# Patient Record
Sex: FEMALE | Race: BLACK OR AFRICAN AMERICAN | NOT HISPANIC OR LATINO | Employment: PART TIME | URBAN - METROPOLITAN AREA
[De-identification: names, ages, dates, MRNs, and addresses within clinical notes are randomized per-mention and may not be internally consistent; named-entity substitution may affect disease eponyms.]

---

## 2018-05-22 ENCOUNTER — HOSPITAL ENCOUNTER (OUTPATIENT)
Dept: CARDIOLOGY | Facility: HOSPITAL | Age: 48
Discharge: HOME | End: 2018-05-22
Attending: SURGERY | Admitting: SURGERY

## 2019-02-26 ENCOUNTER — HOSPITAL ENCOUNTER (OUTPATIENT)
Dept: FAMILY MEDICINE CLINIC | Facility: CLINIC | Age: 49
Setting detail: SPECIMEN
Discharge: HOME | End: 2019-02-26
Attending: FAMILY MEDICINE | Admitting: FAMILY MEDICINE

## 2019-02-26 LAB
ALBUMIN SERPL-MCNC: 3.6 G/DL (ref 3.5–4.8)
ALBUMIN/GLOB SERPL: 1 {RATIO} (ref 1–1.7)
ALP SERPL-CCNC: 54 IU/L (ref 32–91)
ALT SERPL-CCNC: 17 IU/L (ref 14–54)
ANION GAP SERPL CALC-SCNC: 13.1 MMOL/L (ref 10–20)
AST SERPL-CCNC: 20 IU/L (ref 15–41)
BASOPHILS # BLD AUTO: 0 10*3/UL (ref 0–0.2)
BASOPHILS NFR BLD AUTO: 1 % (ref 0–2)
BILIRUB SERPL-MCNC: 0.5 MG/DL (ref 0.3–1.2)
BUN SERPL-MCNC: 9 MG/DL (ref 8–20)
BUN/CREAT SERPL: 12.9 (ref 5.4–26.2)
CALCIUM SERPL-MCNC: 8.9 MG/DL (ref 8.9–10.3)
CHLORIDE SERPL-SCNC: 103 MMOL/L (ref 101–111)
CHOLEST SERPL-MCNC: 200 MG/DL
CHOLEST/HDLC SERPL: 2.6 {RATIO}
CONV CO2: 25 MMOL/L (ref 22–32)
CONV LDL CHOLESTEROL DIRECT: 117 MG/DL (ref 0–100)
CONV TOTAL PROTEIN: 7.2 G/DL (ref 6.1–7.9)
CREAT UR-MCNC: 0.7 MG/DL (ref 0.4–1)
DIFFERENTIAL METHOD BLD: (no result)
EOSINOPHIL # BLD AUTO: 0.1 10*3/UL (ref 0–0.3)
EOSINOPHIL # BLD AUTO: 3 % (ref 0–3)
ERYTHROCYTE [DISTWIDTH] IN BLOOD BY AUTOMATED COUNT: 13.4 % (ref 11.5–14.5)
GLOBULIN UR ELPH-MCNC: 3.6 G/DL (ref 2.5–3.8)
GLUCOSE SERPL-MCNC: 101 MG/DL (ref 65–99)
HCT VFR BLD AUTO: 38.6 % (ref 35–49)
HDLC SERPL-MCNC: 77 MG/DL
HGB BLD-MCNC: 12.7 G/DL (ref 12–15)
LDLC/HDLC SERPL: 1.5 {RATIO}
LIPID INTERPRETATION: ABNORMAL
LYMPHOCYTES # BLD AUTO: 1.8 10*3/UL (ref 0.8–4.8)
LYMPHOCYTES NFR BLD AUTO: 39 % (ref 18–42)
MCH RBC QN AUTO: 31.8 PG (ref 26–32)
MCHC RBC AUTO-ENTMCNC: 32.8 G/DL (ref 32–36)
MCV RBC AUTO: 97 FL (ref 80–94)
MONOCYTES # BLD AUTO: 0.3 10*3/UL (ref 0.1–1.3)
MONOCYTES NFR BLD AUTO: 8 % (ref 2–11)
NEUTROPHILS # BLD AUTO: 2.3 10*3/UL (ref 2.3–8.6)
NEUTROPHILS NFR BLD AUTO: 49 % (ref 50–75)
NRBC BLD AUTO-RTO: 0 /100{WBCS}
NRBC/RBC NFR BLD MANUAL: 0 10*3/UL
PLATELET # BLD AUTO: 238 10*3/UL (ref 150–450)
PMV BLD AUTO: 8.8 FL (ref 7.4–10.4)
POTASSIUM SERPL-SCNC: 4.1 MMOL/L (ref 3.6–5.1)
RBC # BLD AUTO: 3.98 10*6/UL (ref 4–5.4)
SODIUM SERPL-SCNC: 137 MMOL/L (ref 136–144)
TRIGL SERPL-MCNC: 61 MG/DL
VLDLC SERPL CALC-MCNC: 6.5 MG/DL
WBC # BLD AUTO: 4.6 10*3/UL (ref 4.5–11.5)

## 2019-10-15 ENCOUNTER — HOSPITAL ENCOUNTER (EMERGENCY)
Facility: HOSPITAL | Age: 49
Discharge: HOME | End: 2019-10-15
Admitting: EMERGENCY MEDICINE

## 2019-10-15 ENCOUNTER — APPOINTMENT (OUTPATIENT)
Dept: CT IMAGING | Facility: HOSPITAL | Age: 49
End: 2019-10-15

## 2019-10-15 VITALS
SYSTOLIC BLOOD PRESSURE: 146 MMHG | DIASTOLIC BLOOD PRESSURE: 99 MMHG | WEIGHT: 249.6 LBS | RESPIRATION RATE: 16 BRPM | HEART RATE: 75 BPM | TEMPERATURE: 98.1 F | BODY MASS INDEX: 39.18 KG/M2 | OXYGEN SATURATION: 96 % | HEIGHT: 67 IN

## 2019-10-15 DIAGNOSIS — R51.9 ACUTE NONINTRACTABLE HEADACHE, UNSPECIFIED HEADACHE TYPE: Primary | ICD-10-CM

## 2019-10-15 DIAGNOSIS — I10 HYPERTENSION, UNSPECIFIED TYPE: ICD-10-CM

## 2019-10-15 PROCEDURE — 70450 CT HEAD/BRAIN W/O DYE: CPT

## 2019-10-15 PROCEDURE — 99283 EMERGENCY DEPT VISIT LOW MDM: CPT

## 2019-10-15 RX ORDER — CLONIDINE HYDROCHLORIDE 0.1 MG/1
0.2 TABLET ORAL ONCE
Status: COMPLETED | OUTPATIENT
Start: 2019-10-15 | End: 2019-10-15

## 2019-10-15 RX ORDER — DIPHENHYDRAMINE HCL 50 MG
50 CAPSULE ORAL EVERY 6 HOURS PRN
COMMUNITY
End: 2020-03-04

## 2019-10-15 RX ORDER — HYDROCODONE BITARTRATE AND ACETAMINOPHEN 7.5; 325 MG/1; MG/1
1 TABLET ORAL ONCE
Status: COMPLETED | OUTPATIENT
Start: 2019-10-15 | End: 2019-10-15

## 2019-10-15 RX ORDER — HYDROCODONE BITARTRATE AND ACETAMINOPHEN 7.5; 325 MG/1; MG/1
1-2 TABLET ORAL EVERY 6 HOURS PRN
Qty: 15 TABLET | Refills: 0 | Status: SHIPPED | OUTPATIENT
Start: 2019-10-15 | End: 2020-03-04

## 2019-10-15 RX ADMIN — HYDROCODONE BITARTRATE AND ACETAMINOPHEN 1 TABLET: 7.5; 325 TABLET ORAL at 19:31

## 2019-10-15 RX ADMIN — CLONIDINE HYDROCHLORIDE 0.2 MG: 0.1 TABLET ORAL at 19:31

## 2019-10-15 NOTE — ED NOTES
Dental pain right side. Seen dentist and has abscess. Given antibiotics. Forgot her B/P medication today so b/p is elevated. Has headache right side of head     Luiza Mclaughlin RN  10/15/19 1923       Luiza Mclaughlin RN  10/15/19 1924

## 2019-10-15 NOTE — ED PROVIDER NOTES
Subjective   Chief Complaint: High blood pressure  Context: Patient reports right-sided dental pain for a couple of days.  She reports that she woke up this morning and had increased pain.  She states that she went to her dentist and they diagnosed her with an abscess and put her on ibuprofen and Keflex.  She reports that she had forgotten to take her high blood pressure medicines and her blood pressure was elevated at the dentist office.  She reports that she took her blood pressure medicine after that however her blood pressure has remained elevated this evening.  She does complain of a headache.  She denies numbness, weakness, tingling.  She denies difficulty with speech.  No chest pain or shortness of breath.  Duration: As above  Timing: Constant  Severity: Moderate  Associated symptoms and or modifying factors: As above          History provided by:  Patient      Review of Systems   Constitutional: Negative for chills and fever.   HENT: Positive for dental problem. Negative for congestion.    Respiratory: Negative for shortness of breath.    Cardiovascular: Negative for chest pain.   Gastrointestinal: Negative for diarrhea, nausea and vomiting.   Neurological: Positive for headaches. Negative for speech difficulty, weakness and numbness.       Past Medical History:   Diagnosis Date   • Hypertension        No Known Allergies    Past Surgical History:   Procedure Laterality Date   • TUBAL ABDOMINAL LIGATION         No family history on file.    Social History     Socioeconomic History   • Marital status:      Spouse name: Not on file   • Number of children: Not on file   • Years of education: Not on file   • Highest education level: Not on file   Tobacco Use   • Smoking status: Never Smoker   Substance and Sexual Activity   • Alcohol use: Yes           Objective   Physical Exam  The patient is well-developed, well-nourished, alert, cooperative and in no acute distress.    HEENT exam is normocephalic and  "atraumatic. Pupils equal ,round, reactive to light. Extraocular muscles are intact. Conjunctivae non- injected, sclerae anicteric, lids without ptosis, edema or erythema. External auditory canals and tympanic membranes are clear. No nasal drainage.  Sinuses non-tender. Mucous membranes are moist.      Neck is supple, non-tender without lymphadenopathy. No JVD, bruit or stridor noted.     Lungs clear to auscultation bilaterally.    Cardiovascular. Regular rate and rhythm     Abdomen is soft, nontender, nondistended. No guarding or rebound noted.     Extremities: There is no significant deformity or joint abnormality. No edema.   Neurologic: Oriented x3 without focal neurological deficits.    Skin shows no rash, petechiae, or purpura.    Procedures           ED Course        Labs Reviewed - No data to display  Ct Head Without Contrast    Result Date: 10/15/2019  Normal CT of the brain without contrast. Brain MRI is more sensitive to evaluate for acute or subacute infarcts and to evaluate for intracranial metastatic disease.  Electronically Signed By-Cecil Fuentes On:10/15/2019 8:42 PM This report was finalized on [id number] by  Cecil Fuentes, .    Medications   HYDROcodone-acetaminophen (NORCO) 7.5-325 MG per tablet 1 tablet (1 tablet Oral Given 10/15/19 1931)   cloNIDine (CATAPRES) tablet 0.2 mg (0.2 mg Oral Given 10/15/19 1931)     BP (!) 153/110 (BP Location: Right arm, Patient Position: Sitting)   Pulse 77   Temp 98 °F (36.7 °C) (Oral)   Resp 14   Ht 170.2 cm (67\")   Wt 113 kg (249 lb 9.6 oz)   SpO2 98%   BMI 39.09 kg/m²             MDM  Number of Diagnoses or Management Options  Acute nonintractable headache, unspecified headache type:   Hypertension, unspecified type:   Diagnosis management comments: MEDICAL DECISION  Comorbidities: Hypertension, dental abscess  Differentials: Head bleed, uncontrolled hypertension.; this list is not all inclusive and does not constitute the entirety of considered " causes  Radiology interpretation:  Images reviewed by me and interpreted by radiologist, CT of the head shows no acute intracranial abnormality.    She was medicated emergency department.  She reports her headache is gone.  Repeat blood pressure 146/99  Inspect was queried.  Prescription for Norco.  Plan for discharge was discussed.       Amount and/or Complexity of Data Reviewed  Tests in the radiology section of CPT®: reviewed and ordered        Final diagnoses:   Acute nonintractable headache, unspecified headache type   Hypertension, unspecified type              Tressa Dumas, ANDREW  10/15/19 2120

## 2019-10-16 NOTE — DISCHARGE INSTRUCTIONS
Follow-up with your doctor.  Take your blood pressure medicine regularly.  Norco for pain as prescribed.  Take your antibiotic as prescribed.  Follow-up with your dentist

## 2020-03-04 ENCOUNTER — OFFICE VISIT (OUTPATIENT)
Dept: FAMILY MEDICINE CLINIC | Facility: CLINIC | Age: 50
End: 2020-03-04

## 2020-03-04 VITALS
SYSTOLIC BLOOD PRESSURE: 138 MMHG | DIASTOLIC BLOOD PRESSURE: 87 MMHG | HEART RATE: 85 BPM | OXYGEN SATURATION: 96 % | HEIGHT: 66 IN | WEIGHT: 252 LBS | TEMPERATURE: 97.7 F | BODY MASS INDEX: 40.5 KG/M2

## 2020-03-04 DIAGNOSIS — L91.8 SKIN TAGS, MULTIPLE ACQUIRED: ICD-10-CM

## 2020-03-04 DIAGNOSIS — I10 ESSENTIAL HYPERTENSION: Primary | ICD-10-CM

## 2020-03-04 DIAGNOSIS — G89.29 CHRONIC LEFT-SIDED LOW BACK PAIN WITHOUT SCIATICA: ICD-10-CM

## 2020-03-04 DIAGNOSIS — M54.50 CHRONIC LEFT-SIDED LOW BACK PAIN WITHOUT SCIATICA: ICD-10-CM

## 2020-03-04 PROBLEM — I83.90 VARICOSE VEINS OF LOWER EXTREMITY: Status: ACTIVE | Noted: 2018-04-04

## 2020-03-04 PROBLEM — F32.A DEPRESSION: Status: ACTIVE | Noted: 2018-10-15

## 2020-03-04 PROBLEM — K21.9 GASTROESOPHAGEAL REFLUX DISEASE: Status: ACTIVE | Noted: 2018-10-15

## 2020-03-04 PROBLEM — E78.5 HYPERLIPIDEMIA: Status: ACTIVE | Noted: 2018-04-04

## 2020-03-04 PROCEDURE — 99214 OFFICE O/P EST MOD 30 MIN: CPT | Performed by: FAMILY MEDICINE

## 2020-03-04 RX ORDER — BACLOFEN 10 MG/1
10 TABLET ORAL 3 TIMES DAILY
Qty: 90 TABLET | Refills: 1 | Status: SHIPPED | OUTPATIENT
Start: 2020-03-04 | End: 2020-08-26

## 2020-03-04 RX ORDER — OMEPRAZOLE 20 MG/1
CAPSULE, DELAYED RELEASE ORAL
COMMUNITY
Start: 2018-12-07 | End: 2020-07-10 | Stop reason: SDUPTHER

## 2020-03-04 RX ORDER — LISINOPRIL AND HYDROCHLOROTHIAZIDE 20; 12.5 MG/1; MG/1
1 TABLET ORAL DAILY
COMMUNITY
End: 2020-04-29 | Stop reason: SDUPTHER

## 2020-03-04 RX ORDER — ATORVASTATIN CALCIUM 20 MG/1
TABLET, FILM COATED ORAL EVERY 24 HOURS
COMMUNITY
Start: 2018-10-15 | End: 2020-07-10 | Stop reason: SDUPTHER

## 2020-03-04 RX ORDER — FLUTICASONE PROPIONATE 50 MCG
SPRAY, SUSPENSION (ML) NASAL
COMMUNITY
Start: 2018-10-15 | End: 2020-07-10 | Stop reason: SDUPTHER

## 2020-03-04 NOTE — PROGRESS NOTES
Subjective   Chief Complaint   Patient presents with   • skin tags checked   • Hypertension     f/u     Aisha Appiah is a 49 y.o. female.     Hypertension   This is a chronic problem. The current episode started more than 1 year ago. The problem has been gradually improving since onset. Pertinent negatives include no anxiety, blurred vision, chest pain, headaches, malaise/fatigue, palpitations or shortness of breath. There are no associated agents to hypertension. Current antihypertension treatment includes diuretics. The current treatment provides moderate improvement. There are no compliance problems.    Back Pain   This is a new problem. The current episode started 1 to 4 weeks ago. The problem occurs intermittently. The problem has been waxing and waning since onset. The pain is present in the lumbar spine. The quality of the pain is described as aching. The pain does not radiate. The pain is moderate. The symptoms are aggravated by bending. Pertinent negatives include no abdominal pain, chest pain, fever or headaches. She has tried analgesics for the symptoms. The treatment provided mild relief.      Past Medical History:   Diagnosis Date   • Hypertension      Past Surgical History:   Procedure Laterality Date   • TUBAL ABDOMINAL LIGATION       No Known Allergies  Social History     Socioeconomic History   • Marital status:      Spouse name: Not on file   • Number of children: Not on file   • Years of education: Not on file   • Highest education level: Not on file   Tobacco Use   • Smoking status: Former Smoker     Years: 15.00   • Smokeless tobacco: Never Used   • Tobacco comment: quit 13 yrs ago   Substance and Sexual Activity   • Alcohol use: Yes     Comment: caffeine 1c qd     Social History     Tobacco Use   Smoking Status Former Smoker   • Years: 15.00   Smokeless Tobacco Never Used   Tobacco Comment    quit 13 yrs ago       family history is not on file.  Current Outpatient Medications on File  "Prior to Visit   Medication Sig Dispense Refill   • atorvastatin (LIPITOR) 20 MG tablet Daily.     • fluticasone (FLONASE) 50 MCG/ACT nasal spray FLONASE 50 MCG/ACT NASAL SUSPENSION     • lisinopril-hydrochlorothiazide (PRINZIDE,ZESTORETIC) 20-12.5 MG per tablet Take 1 tablet by mouth Daily.     • omeprazole (priLOSEC) 20 MG capsule TK ONE C PO D FOR STOMACH ACID       No current facility-administered medications on file prior to visit.      Patient Active Problem List   Diagnosis   • Depression   • Gastroesophageal reflux disease   • Hyperlipidemia   • Hypertension   • Varicose veins of lower extremity   • Lower back pain   • Inflamed skin tag       The following portions of the patient's history were reviewed and updated as appropriate: allergies, current medications, past family history, past medical history, past social history, past surgical history and problem list.    Review of Systems   Constitutional: Negative for chills, fever and malaise/fatigue.   HENT: Negative for sinus pressure and sore throat.    Eyes: Negative for blurred vision.   Respiratory: Negative for cough and shortness of breath.    Cardiovascular: Negative for chest pain and palpitations.   Gastrointestinal: Negative for abdominal pain.   Endocrine: Negative for polyuria.   Musculoskeletal: Positive for back pain.   Skin: Negative for rash.   Neurological: Negative for dizziness and headache.   Hematological: Negative for adenopathy.   Psychiatric/Behavioral: Negative for depressed mood.       Objective   /87 (BP Location: Left arm, Patient Position: Sitting, Cuff Size: Large Adult)   Pulse 85   Temp 97.7 °F (36.5 °C) (Oral)   Ht 167.6 cm (66\")   Wt 114 kg (252 lb)   SpO2 96%   BMI 40.67 kg/m²   Physical Exam   Constitutional: She is oriented to person, place, and time. She appears well-developed. No distress.   HENT:   Head: Normocephalic.   Eyes: Conjunctivae and lids are normal.   Neck: Trachea normal. No thyroid mass and no " thyromegaly present.   Cardiovascular: Normal rate, regular rhythm and normal heart sounds.   Pulmonary/Chest: Effort normal and breath sounds normal.   Musculoskeletal: Normal range of motion.   Lymphadenopathy:     She has no cervical adenopathy.   Neurological: She is alert and oriented to person, place, and time.   Skin: Skin is warm and dry.   Multiple irritated skin tags around neck   Psychiatric: She has a normal mood and affect. Her speech is normal and behavior is normal. She is attentive.       No visits with results within 1 Week(s) from this visit.   Latest known visit with results is:   No results found for any previous visit.           Assessment/Plan   Problems Addressed this Visit        Cardiovascular and Mediastinum    Hypertension - Primary    Relevant Medications    lisinopril-hydrochlorothiazide (PRINZIDE,ZESTORETIC) 20-12.5 MG per tablet       Nervous and Auditory    Lower back pain       Musculoskeletal and Integument    Inflamed skin tag     Return for removal.

## 2020-03-10 ENCOUNTER — OFFICE VISIT (OUTPATIENT)
Dept: FAMILY MEDICINE CLINIC | Facility: CLINIC | Age: 50
End: 2020-03-10

## 2020-03-10 VITALS
HEART RATE: 87 BPM | WEIGHT: 254 LBS | DIASTOLIC BLOOD PRESSURE: 86 MMHG | OXYGEN SATURATION: 95 % | TEMPERATURE: 96.9 F | BODY MASS INDEX: 41 KG/M2 | SYSTOLIC BLOOD PRESSURE: 128 MMHG

## 2020-03-10 DIAGNOSIS — L91.8 INFLAMED SKIN TAG: Primary | ICD-10-CM

## 2020-03-10 PROCEDURE — 11200 RMVL SKIN TAGS UP TO&INC 15: CPT | Performed by: FAMILY MEDICINE

## 2020-03-10 NOTE — PROGRESS NOTES
Subjective   Chief Complaint   Patient presents with   • remove skin tags     Aisha Appiah is a 49 y.o. female.   Skin Tag Removal  Date/Time: 3/10/2020 3:15 PM  Performed by: Ariane Beaulieu MD  Authorized by: Ariane Beaulieu MD   Preparation: Patient was prepped and draped in the usual sterile fashion.  Local anesthesia used: yes  Anesthesia: local infiltration    Anesthesia:  Local anesthesia used: yes  Local Anesthetic: lidocaine 1% without epinephrine  Anesthetic total: 3 mL    Sedation:  Patient sedated: no    Patient tolerance: Patient tolerated the procedure well with no immediate complications  Comments: 2 tags removed from beneath right breast, 8 removed from right neck, 3 removed from left neck          History of Present Illness   Past Medical History:   Diagnosis Date   • Hypertension      Past Surgical History:   Procedure Laterality Date   • TUBAL ABDOMINAL LIGATION       No Known Allergies  Social History     Socioeconomic History   • Marital status:      Spouse name: Not on file   • Number of children: Not on file   • Years of education: Not on file   • Highest education level: Not on file   Tobacco Use   • Smoking status: Former Smoker     Years: 15.00   • Smokeless tobacco: Never Used   • Tobacco comment: quit 13 yrs ago   Substance and Sexual Activity   • Alcohol use: Yes     Comment: caffeine 1c qd     Social History     Tobacco Use   Smoking Status Former Smoker   • Years: 15.00   Smokeless Tobacco Never Used   Tobacco Comment    quit 13 yrs ago       family history is not on file.  Current Outpatient Medications on File Prior to Visit   Medication Sig Dispense Refill   • atorvastatin (LIPITOR) 20 MG tablet Daily.     • baclofen (LIORESAL) 10 MG tablet Take 1 tablet by mouth 3 (Three) Times a Day. 90 tablet 1   • fluticasone (FLONASE) 50 MCG/ACT nasal spray FLONASE 50 MCG/ACT NASAL SUSPENSION     • lisinopril-hydrochlorothiazide (PRINZIDE,ZESTORETIC) 20-12.5 MG per tablet Take  1 tablet by mouth Daily.     • omeprazole (priLOSEC) 20 MG capsule TK ONE C PO D FOR STOMACH ACID       No current facility-administered medications on file prior to visit.      Patient Active Problem List   Diagnosis   • Depression   • Gastroesophageal reflux disease   • Hyperlipidemia   • Hypertension   • Varicose veins of lower extremity   • Lower back pain   • Inflamed skin tag       The following portions of the patient's history were reviewed and updated as appropriate: allergies, current medications, past family history, past medical history, past social history, past surgical history and problem list.    Review of Systems    Objective   /86 (BP Location: Right arm, Patient Position: Sitting, Cuff Size: Large Adult)   Pulse 87   Temp 96.9 °F (36.1 °C) (Tympanic)   Wt 115 kg (254 lb)   SpO2 95%   BMI 41.00 kg/m²   Physical Exam    No visits with results within 1 Week(s) from this visit.   Latest known visit with results is:   No results found for any previous visit.       Aisha was seen today for remove skin tags.    Diagnoses and all orders for this visit:    Inflamed skin tag  -     Skin Tag Removal

## 2020-04-01 ENCOUNTER — TELEPHONE (OUTPATIENT)
Dept: FAMILY MEDICINE CLINIC | Facility: CLINIC | Age: 50
End: 2020-04-01

## 2020-04-01 RX ORDER — BENZONATATE 200 MG/1
200 CAPSULE ORAL 3 TIMES DAILY PRN
Qty: 20 CAPSULE | Refills: 0 | Status: SHIPPED | OUTPATIENT
Start: 2020-04-01 | End: 2020-09-02

## 2020-04-01 NOTE — TELEPHONE ENCOUNTER
Since patient taking Lisinopril for BP could be chronic cough because of that, she needs office visit to discuss change in BP meds. I have sent Rx Tessalon pearls for  Sx management.

## 2020-04-01 NOTE — TELEPHONE ENCOUNTER
PT called states she  Is having a bad  Chronic cough,  She states no fever or  Trouble breathing. plz advise

## 2020-04-06 ENCOUNTER — TELEPHONE (OUTPATIENT)
Dept: FAMILY MEDICINE CLINIC | Facility: CLINIC | Age: 50
End: 2020-04-06

## 2020-04-06 NOTE — TELEPHONE ENCOUNTER
Pt c/o still having a cough and wants to be tested for COVID-19. I gave her the info to Valerie Pt .

## 2020-04-09 ENCOUNTER — OFFICE VISIT (OUTPATIENT)
Dept: FAMILY MEDICINE CLINIC | Facility: CLINIC | Age: 50
End: 2020-04-09

## 2020-04-09 DIAGNOSIS — J40 BRONCHITIS: Primary | ICD-10-CM

## 2020-04-09 PROBLEM — J20.9 ACUTE BRONCHITIS: Status: ACTIVE | Noted: 2020-04-09

## 2020-04-09 PROCEDURE — 99213 OFFICE O/P EST LOW 20 MIN: CPT | Performed by: FAMILY MEDICINE

## 2020-04-09 RX ORDER — METHYLPREDNISOLONE 4 MG/1
TABLET ORAL
Qty: 1 EACH | Refills: 0 | Status: SHIPPED | OUTPATIENT
Start: 2020-04-09 | End: 2020-09-02

## 2020-04-09 RX ORDER — AZITHROMYCIN 250 MG/1
250 TABLET, FILM COATED ORAL DAILY
Qty: 6 TABLET | Refills: 0 | Status: SHIPPED | OUTPATIENT
Start: 2020-04-09 | End: 2020-04-14

## 2020-04-09 RX ORDER — ALBUTEROL SULFATE 90 UG/1
2 AEROSOL, METERED RESPIRATORY (INHALATION) EVERY 6 HOURS PRN
Qty: 1 INHALER | Refills: 0 | Status: SHIPPED | OUTPATIENT
Start: 2020-04-09

## 2020-04-09 NOTE — PROGRESS NOTES
Subjective   Chief Complaint   Patient presents with   • Cough     2 weeks     Aisha Appiah is a 49 y.o. female.     Patient Care Team:  Ariane Beaulieu MD as PCP - General (Family Medicine)    She is being evaluated today due to cough and congestion, which she has been dealing with for about last 2 weeks.  She reports that her cough feels to be coming from her chest and at times she brings up some sputum but usually she has hard time to do that and it seems to be thick.  She denies any difficulty breathing or any fever.  She has history of recurrent bronchitis and her current situation feels the same.  She denies any fever, nausea, vomiting, or diarrhea.  She denies any change in her smell or taste.  She tried to take Tessalon Perles, which she already has at home but those are not really helping.  On further questioning if it comes to her cough I also address her blood pressure medicine.  She is on lisinopril which can potentially cause dry cough, but she is not really complaining about dry cough.  She has been on lisinopril for about a year per her report and never had any issue with that.       The following portions of the patient's history were reviewed and updated as appropriate: allergies, current medications, past family history, past medical history, past social history, past surgical history and problem list.  Past Medical History:   Diagnosis Date   • Hypertension      Past Surgical History:   Procedure Laterality Date   • TUBAL ABDOMINAL LIGATION       The patient has a family history of  History reviewed. No pertinent family history.  Social History     Socioeconomic History   • Marital status:      Spouse name: Not on file   • Number of children: Not on file   • Years of education: Not on file   • Highest education level: Not on file   Tobacco Use   • Smoking status: Former Smoker     Years: 15.00   • Smokeless tobacco: Never Used   • Tobacco comment: quit 13 yrs ago   Substance and  Sexual Activity   • Alcohol use: Yes     Comment: caffeine 1c qd       Review of Systems   Constitutional: Negative for activity change, appetite change, chills and fever.   HENT: Negative for congestion, ear pain, postnasal drip, sinus pressure, sore throat and swollen glands.    Respiratory: Positive for cough and wheezing. Negative for choking, chest tightness, shortness of breath and stridor.    Cardiovascular: Negative for chest pain.   Skin: Negative for dry skin and rash.   Allergic/Immunologic: Positive for environmental allergies.     There were no vitals taken for this visit.    Current Outpatient Medications:   •  albuterol sulfate  (90 Base) MCG/ACT inhaler, Inhale 2 puffs Every 6 (Six) Hours As Needed for Wheezing., Disp: 1 inhaler, Rfl: 0  •  atorvastatin (LIPITOR) 20 MG tablet, Daily., Disp: , Rfl:   •  azithromycin (ZITHROMAX) 250 MG tablet, Take 1 tablet by mouth Daily for 5 days. Take 2 tablets the first day, then 1 tablet daily for 4 days., Disp: 6 tablet, Rfl: 0  •  baclofen (LIORESAL) 10 MG tablet, Take 1 tablet by mouth 3 (Three) Times a Day., Disp: 90 tablet, Rfl: 1  •  benzonatate (TESSALON) 200 MG capsule, Take 1 capsule by mouth 3 (Three) Times a Day As Needed for Cough., Disp: 20 capsule, Rfl: 0  •  fluticasone (FLONASE) 50 MCG/ACT nasal spray, FLONASE 50 MCG/ACT NASAL SUSPENSION, Disp: , Rfl:   •  lisinopril-hydrochlorothiazide (PRINZIDE,ZESTORETIC) 20-12.5 MG per tablet, Take 1 tablet by mouth Daily., Disp: , Rfl:   •  methylPREDNISolone (Medrol) 4 MG tablet, Take as directed on package instructions., Disp: 1 each, Rfl: 0  •  omeprazole (priLOSEC) 20 MG capsule, TK ONE C PO D FOR STOMACH ACID, Disp: , Rfl:     Objective   Physical Exam   Constitutional: She is oriented to person, place, and time. No distress.   Patient was evaluated on the phone, she is pleasant and cooperative, she is in no distress, normal speech and voice.  Deep chest cough noted throughout the interview, but  no shortness of breath.   Neurological: She is alert and oriented to person, place, and time.   Psychiatric: She has a normal mood and affect. Judgment and thought content normal.              Assessment/Plan   Problems Addressed this Visit        Respiratory    Bronchitis - Primary    Relevant Medications    albuterol sulfate  (90 Base) MCG/ACT inhaler                     Requested Prescriptions     Signed Prescriptions Disp Refills   • azithromycin (ZITHROMAX) 250 MG tablet 6 tablet 0     Sig: Take 1 tablet by mouth Daily for 5 days. Take 2 tablets the first day, then 1 tablet daily for 4 days.   • methylPREDNISolone (Medrol) 4 MG tablet 1 each 0     Sig: Take as directed on package instructions.   • albuterol sulfate  (90 Base) MCG/ACT inhaler 1 inhaler 0     Sig: Inhale 2 puffs Every 6 (Six) Hours As Needed for Wheezing.

## 2020-04-13 ENCOUNTER — HOSPITAL ENCOUNTER (EMERGENCY)
Facility: HOSPITAL | Age: 50
Discharge: HOME | End: 2020-04-13
Attending: EMERGENCY MEDICINE | Admitting: EMERGENCY MEDICINE

## 2020-04-13 ENCOUNTER — APPOINTMENT (OUTPATIENT)
Dept: GENERAL RADIOLOGY | Facility: HOSPITAL | Age: 50
End: 2020-04-13

## 2020-04-13 VITALS
SYSTOLIC BLOOD PRESSURE: 155 MMHG | HEART RATE: 71 BPM | HEIGHT: 67 IN | BODY MASS INDEX: 40.76 KG/M2 | DIASTOLIC BLOOD PRESSURE: 88 MMHG | OXYGEN SATURATION: 99 % | WEIGHT: 259.7 LBS | RESPIRATION RATE: 19 BRPM | TEMPERATURE: 98 F

## 2020-04-13 DIAGNOSIS — J40 BRONCHITIS: Primary | ICD-10-CM

## 2020-04-13 DIAGNOSIS — M54.6 ACUTE LEFT-SIDED THORACIC BACK PAIN: ICD-10-CM

## 2020-04-13 LAB
ALBUMIN SERPL-MCNC: 4.2 G/DL (ref 3.5–5.2)
ALBUMIN/GLOB SERPL: 1.2 G/DL
ALP SERPL-CCNC: 61 U/L (ref 39–117)
ALT SERPL W P-5'-P-CCNC: 17 U/L (ref 1–33)
ANION GAP SERPL CALCULATED.3IONS-SCNC: 11 MMOL/L (ref 5–15)
AST SERPL-CCNC: 19 U/L (ref 1–32)
BASOPHILS # BLD AUTO: 0 10*3/MM3 (ref 0–0.2)
BASOPHILS NFR BLD AUTO: 0.4 % (ref 0–1.5)
BILIRUB SERPL-MCNC: 0.3 MG/DL (ref 0.2–1.2)
BILIRUB UR QL STRIP: NEGATIVE
BUN BLD-MCNC: 15 MG/DL (ref 6–20)
BUN/CREAT SERPL: 17.6 (ref 7–25)
CALCIUM SPEC-SCNC: 9.5 MG/DL (ref 8.6–10.5)
CHLORIDE SERPL-SCNC: 100 MMOL/L (ref 98–107)
CLARITY UR: CLEAR
CO2 SERPL-SCNC: 28 MMOL/L (ref 22–29)
COLOR UR: YELLOW
CREAT BLD-MCNC: 0.85 MG/DL (ref 0.57–1)
D DIMER PPP FEU-MCNC: <0.17 MCGFEU/ML (ref 0.17–0.59)
DEPRECATED RDW RBC AUTO: 46.8 FL (ref 37–54)
EOSINOPHIL # BLD AUTO: 0 10*3/MM3 (ref 0–0.4)
EOSINOPHIL NFR BLD AUTO: 0 % (ref 0.3–6.2)
ERYTHROCYTE [DISTWIDTH] IN BLOOD BY AUTOMATED COUNT: 14.1 % (ref 12.3–15.4)
GFR SERPL CREATININE-BSD FRML MDRD: 87 ML/MIN/1.73
GLOBULIN UR ELPH-MCNC: 3.5 GM/DL
GLUCOSE BLD-MCNC: 129 MG/DL (ref 65–99)
GLUCOSE UR STRIP-MCNC: NEGATIVE MG/DL
HCT VFR BLD AUTO: 38.7 % (ref 34–46.6)
HGB BLD-MCNC: 13.2 G/DL (ref 12–15.9)
HGB UR QL STRIP.AUTO: NEGATIVE
KETONES UR QL STRIP: NEGATIVE
LEUKOCYTE ESTERASE UR QL STRIP.AUTO: NEGATIVE
LYMPHOCYTES # BLD AUTO: 1.7 10*3/MM3 (ref 0.7–3.1)
LYMPHOCYTES NFR BLD AUTO: 21.7 % (ref 19.6–45.3)
MCH RBC QN AUTO: 32.9 PG (ref 26.6–33)
MCHC RBC AUTO-ENTMCNC: 34.2 G/DL (ref 31.5–35.7)
MCV RBC AUTO: 96 FL (ref 79–97)
MONOCYTES # BLD AUTO: 0.5 10*3/MM3 (ref 0.1–0.9)
MONOCYTES NFR BLD AUTO: 5.9 % (ref 5–12)
NEUTROPHILS # BLD AUTO: 5.6 10*3/MM3 (ref 1.7–7)
NEUTROPHILS NFR BLD AUTO: 72 % (ref 42.7–76)
NITRITE UR QL STRIP: NEGATIVE
NRBC BLD AUTO-RTO: 0.2 /100 WBC (ref 0–0.2)
PH UR STRIP.AUTO: 6 [PH] (ref 5–8)
PLATELET # BLD AUTO: 234 10*3/MM3 (ref 140–450)
PMV BLD AUTO: 8.6 FL (ref 6–12)
POTASSIUM BLD-SCNC: 4.2 MMOL/L (ref 3.5–5.2)
PROT SERPL-MCNC: 7.7 G/DL (ref 6–8.5)
PROT UR QL STRIP: NEGATIVE
RBC # BLD AUTO: 4.03 10*6/MM3 (ref 3.77–5.28)
SODIUM BLD-SCNC: 139 MMOL/L (ref 136–145)
SP GR UR STRIP: 1.01 (ref 1–1.03)
UROBILINOGEN UR QL STRIP: NORMAL
WBC NRBC COR # BLD: 7.8 10*3/MM3 (ref 3.4–10.8)

## 2020-04-13 PROCEDURE — 80053 COMPREHEN METABOLIC PANEL: CPT | Performed by: EMERGENCY MEDICINE

## 2020-04-13 PROCEDURE — 99283 EMERGENCY DEPT VISIT LOW MDM: CPT

## 2020-04-13 PROCEDURE — 85025 COMPLETE CBC W/AUTO DIFF WBC: CPT | Performed by: EMERGENCY MEDICINE

## 2020-04-13 PROCEDURE — 71045 X-RAY EXAM CHEST 1 VIEW: CPT

## 2020-04-13 PROCEDURE — 85379 FIBRIN DEGRADATION QUANT: CPT | Performed by: EMERGENCY MEDICINE

## 2020-04-13 PROCEDURE — 81003 URINALYSIS AUTO W/O SCOPE: CPT | Performed by: EMERGENCY MEDICINE

## 2020-04-13 RX ORDER — MELOXICAM 15 MG/1
15 TABLET ORAL DAILY
Qty: 10 TABLET | Refills: 0 | Status: SHIPPED | OUTPATIENT
Start: 2020-04-13 | End: 2020-11-09

## 2020-04-13 RX ORDER — CYCLOBENZAPRINE HCL 10 MG
10 TABLET ORAL 3 TIMES DAILY PRN
Qty: 15 TABLET | Refills: 0 | Status: SHIPPED | OUTPATIENT
Start: 2020-04-13 | End: 2020-08-26 | Stop reason: SDUPTHER

## 2020-04-13 NOTE — ED PROVIDER NOTES
Subjective   History of Present Illness  49-year-old female presents with cough.  She states that she has been having symptoms for about 3 weeks.  She states her doctor had given her a Z-Akash and steroids.  She states she now has bringing up some clear sputum light yellow.  She reports no fever no vomiting no diarrhea.  She has had no urinary difficulty.  She complains of pain left posterior chest for about 3 weeks.  It is worse when she walks.  She has some increased pain with cough.  She does not complain of abdominal pain  Review of Systems  Negative for fever vomiting diarrhea dysuria  Past Medical History:   Diagnosis Date   • Hypertension        No Known Allergies    Past Surgical History:   Procedure Laterality Date   • TUBAL ABDOMINAL LIGATION         No family history on file.    Social History     Socioeconomic History   • Marital status:      Spouse name: Not on file   • Number of children: Not on file   • Years of education: Not on file   • Highest education level: Not on file   Tobacco Use   • Smoking status: Former Smoker     Years: 15.00   • Smokeless tobacco: Never Used   • Tobacco comment: quit 13 yrs ago   Substance and Sexual Activity   • Alcohol use: Yes     Comment: caffeine 1c qd     Prior to Admission medications    Medication Sig Start Date End Date Taking? Authorizing Provider   albuterol sulfate  (90 Base) MCG/ACT inhaler Inhale 2 puffs Every 6 (Six) Hours As Needed for Wheezing. 4/9/20   Adri Raya MD   atorvastatin (LIPITOR) 20 MG tablet Daily. 10/15/18   ProviderLeonor MD   azithromycin (ZITHROMAX) 250 MG tablet Take 1 tablet by mouth Daily for 5 days. Take 2 tablets the first day, then 1 tablet daily for 4 days. 4/9/20 4/14/20  Adri Raya MD   baclofen (LIORESAL) 10 MG tablet Take 1 tablet by mouth 3 (Three) Times a Day. 3/4/20   Ariane Beaulieu MD   benzonatate (TESSALON) 200 MG capsule Take 1 capsule by mouth 3 (Three) Times a Day As Needed for  Cough. 4/1/20   Sarabjit Sandoval MD   fluticasone (FLONASE) 50 MCG/ACT nasal spray FLONASE 50 MCG/ACT NASAL SUSPENSION 10/15/18   ProviderLeonor MD   lisinopril-hydrochlorothiazide (PRINZIDE,ZESTORETIC) 20-12.5 MG per tablet Take 1 tablet by mouth Daily.    Leonor Guillen MD   methylPREDNISolone (Medrol) 4 MG tablet Take as directed on package instructions. 4/9/20   Adri Raya MD   omeprazole (priLOSEC) 20 MG capsule TK ONE C PO D FOR STOMACH ACID 12/7/18   Provider, MD Leonor           Objective   Physical Exam  49-year-old female awake alert.  Generally well-developed well-nourished.  Overweight.  Pupils equal round react light.  Oropharynx clear neck supple chest clear equal breath sounds cardiovascular regular in rhythm she has pain lower left parathoracic musculature.  No mass appreciated.  She has no direct spine tenderness.  There is no warmth.  Abdomen soft nontender.  Legs are Norvasc intact without deficit negative calf tenderness  Procedures           ED Course      Results for orders placed or performed during the hospital encounter of 04/13/20   Comprehensive Metabolic Panel   Result Value Ref Range    Glucose 129 (H) 65 - 99 mg/dL    BUN 15 6 - 20 mg/dL    Creatinine 0.85 0.57 - 1.00 mg/dL    Sodium 139 136 - 145 mmol/L    Potassium 4.2 3.5 - 5.2 mmol/L    Chloride 100 98 - 107 mmol/L    CO2 28.0 22.0 - 29.0 mmol/L    Calcium 9.5 8.6 - 10.5 mg/dL    Total Protein 7.7 6.0 - 8.5 g/dL    Albumin 4.20 3.50 - 5.20 g/dL    ALT (SGPT) 17 1 - 33 U/L    AST (SGOT) 19 1 - 32 U/L    Alkaline Phosphatase 61 39 - 117 U/L    Total Bilirubin 0.3 0.2 - 1.2 mg/dL    eGFR  African Amer 87 >60 mL/min/1.73    Globulin 3.5 gm/dL    A/G Ratio 1.2 g/dL    BUN/Creatinine Ratio 17.6 7.0 - 25.0    Anion Gap 11.0 5.0 - 15.0 mmol/L   D-dimer, Quantitative   Result Value Ref Range    D-Dimer, Quantitative <0.17 (L) 0.17 - 0.59 MCGFEU/mL   Urinalysis With Microscopic If Indicated (No Culture) - Urine,  "Clean Catch   Result Value Ref Range    Color, UA Yellow Yellow, Straw    Appearance, UA Clear Clear    pH, UA 6.0 5.0 - 8.0    Specific Gravity, UA 1.009 1.005 - 1.030    Glucose, UA Negative Negative    Ketones, UA Negative Negative    Bilirubin, UA Negative Negative    Blood, UA Negative Negative    Protein, UA Negative Negative    Leuk Esterase, UA Negative Negative    Nitrite, UA Negative Negative    Urobilinogen, UA 0.2 E.U./dL 0.2 - 1.0 E.U./dL   CBC Auto Differential   Result Value Ref Range    WBC 7.80 3.40 - 10.80 10*3/mm3    RBC 4.03 3.77 - 5.28 10*6/mm3    Hemoglobin 13.2 12.0 - 15.9 g/dL    Hematocrit 38.7 34.0 - 46.6 %    MCV 96.0 79.0 - 97.0 fL    MCH 32.9 26.6 - 33.0 pg    MCHC 34.2 31.5 - 35.7 g/dL    RDW 14.1 12.3 - 15.4 %    RDW-SD 46.8 37.0 - 54.0 fl    MPV 8.6 6.0 - 12.0 fL    Platelets 234 140 - 450 10*3/mm3    Neutrophil % 72.0 42.7 - 76.0 %    Lymphocyte % 21.7 19.6 - 45.3 %    Monocyte % 5.9 5.0 - 12.0 %    Eosinophil % 0.0 (L) 0.3 - 6.2 %    Basophil % 0.4 0.0 - 1.5 %    Neutrophils, Absolute 5.60 1.70 - 7.00 10*3/mm3    Lymphocytes, Absolute 1.70 0.70 - 3.10 10*3/mm3    Monocytes, Absolute 0.50 0.10 - 0.90 10*3/mm3    Eosinophils, Absolute 0.00 0.00 - 0.40 10*3/mm3    Basophils, Absolute 0.00 0.00 - 0.20 10*3/mm3    nRBC 0.2 0.0 - 0.2 /100 WBC     Xr Chest 1 View    Result Date: 4/13/2020  Mild cardiomegaly. No acute cardiopulmonary abnormality by radiograph.  Electronically Signed By-Kendra Joseph MD On:4/13/2020 12:24 PM This report was finalized on [id number] by  Kendra Joseph MD.    Medications - No data to display  /88   Pulse 71   Temp 98.3 °F (36.8 °C)   Resp 19   Ht 170.2 cm (67\")   Wt 118 kg (259 lb 11.2 oz)   SpO2 99%   BMI 40.68 kg/m²                                        MDM  Chart review:   Comorbidity: As per past history  Differential: Musculoskeletal pain, pneumonia, UTI, pulmonary embolus, lung mass  My EKG interpretation:   Lab: Essentially normal laboratory " evaluation with exception of glucose of 129,  Radiology: I reviewed chest x-ray.  No acute cardiopulmonary abnormality noted mild cardiomegaly  Discussion/treatment: Patient's findings were discussed with her.  This appears most consistent with a musculoskeletal pain.  She was discharged given Mobic and Flexeril.  Advised that she could use heat to the sore.  She can use Mucinex DM for cough.  To follow-up with PMD return new or worsening symptoms.  Findings appear consistent with resolving bronchitis.    Final diagnoses:   Bronchitis   Acute left-sided thoracic back pain            Murtaza Smith MD  04/13/20 2698

## 2020-04-13 NOTE — DISCHARGE INSTRUCTIONS
May use Mucinex DM for cough, follow-up PMD, return new or worsening symptoms.  May use Tylenol for pain

## 2020-04-13 NOTE — ED NOTES
Pt reports being seen by PCP and is on Z pack and steroids      Rocio Perales, RN  04/13/20 [id number]

## 2020-04-13 NOTE — ED NOTES
Patient c/o cough and flank pain x3 weeks. States that PCP placed her on z pack for cough, and since then cough has become productive.      Saida Flores RN  04/13/20 0576

## 2020-04-29 RX ORDER — LISINOPRIL AND HYDROCHLOROTHIAZIDE 20; 12.5 MG/1; MG/1
1 TABLET ORAL DAILY
Qty: 90 TABLET | Refills: 1 | Status: SHIPPED | OUTPATIENT
Start: 2020-04-29 | End: 2020-05-20

## 2020-05-19 ENCOUNTER — TELEPHONE (OUTPATIENT)
Dept: FAMILY MEDICINE CLINIC | Facility: CLINIC | Age: 50
End: 2020-05-19

## 2020-05-19 NOTE — TELEPHONE ENCOUNTER
Pt called stating that she has been on the Lisinopril-HCTZ 20-25 mg for a while from her provider before you, but on file the rx is for the 20-12.5 so that was sent in last month and it is not working for her. Please send in the 5-20-25 mg 90 days to [facility].

## 2020-05-20 RX ORDER — LISINOPRIL AND HYDROCHLOROTHIAZIDE 25; 20 MG/1; MG/1
1 TABLET ORAL DAILY
Qty: 90 TABLET | Refills: 1 | Status: SHIPPED | OUTPATIENT
Start: 2020-05-20 | End: 2020-11-17

## 2020-06-20 ENCOUNTER — APPOINTMENT (OUTPATIENT)
Dept: CT IMAGING | Facility: HOSPITAL | Age: 50
End: 2020-06-20

## 2020-06-20 ENCOUNTER — HOSPITAL ENCOUNTER (EMERGENCY)
Facility: HOSPITAL | Age: 50
Discharge: HOME | End: 2020-06-20
Attending: EMERGENCY MEDICINE | Admitting: EMERGENCY MEDICINE

## 2020-06-20 VITALS
DIASTOLIC BLOOD PRESSURE: 77 MMHG | RESPIRATION RATE: 18 BRPM | TEMPERATURE: 98.8 F | WEIGHT: 245 LBS | SYSTOLIC BLOOD PRESSURE: 141 MMHG | HEIGHT: 67 IN | HEART RATE: 75 BPM | BODY MASS INDEX: 38.45 KG/M2 | OXYGEN SATURATION: 99 %

## 2020-06-20 DIAGNOSIS — E87.6 HYPOKALEMIA: ICD-10-CM

## 2020-06-20 DIAGNOSIS — R42 DIZZY: Primary | ICD-10-CM

## 2020-06-20 LAB
ALBUMIN SERPL-MCNC: 4.6 G/DL (ref 3.5–5.2)
ALBUMIN/GLOB SERPL: 1.6 G/DL
ALP SERPL-CCNC: 61 U/L (ref 39–117)
ALT SERPL W P-5'-P-CCNC: 24 U/L (ref 1–33)
ANION GAP SERPL CALCULATED.3IONS-SCNC: 14 MMOL/L (ref 5–15)
AST SERPL-CCNC: 28 U/L (ref 1–32)
BASOPHILS # BLD AUTO: 0.1 10*3/MM3 (ref 0–0.2)
BASOPHILS NFR BLD AUTO: 1.1 % (ref 0–1.5)
BILIRUB SERPL-MCNC: 0.3 MG/DL (ref 0.2–1.2)
BUN BLD-MCNC: 14 MG/DL (ref 6–20)
BUN BLD-MCNC: ABNORMAL MG/DL
BUN/CREAT SERPL: ABNORMAL
CALCIUM SPEC-SCNC: 9.8 MG/DL (ref 8.6–10.5)
CHLORIDE SERPL-SCNC: 98 MMOL/L (ref 98–107)
CO2 SERPL-SCNC: 26 MMOL/L (ref 22–29)
CREAT BLD-MCNC: 1.08 MG/DL (ref 0.57–1)
DEPRECATED RDW RBC AUTO: 49.4 FL (ref 37–54)
EOSINOPHIL # BLD AUTO: 0.1 10*3/MM3 (ref 0–0.4)
EOSINOPHIL NFR BLD AUTO: 1.6 % (ref 0.3–6.2)
ERYTHROCYTE [DISTWIDTH] IN BLOOD BY AUTOMATED COUNT: 14.9 % (ref 12.3–15.4)
GFR SERPL CREATININE-BSD FRML MDRD: 66 ML/MIN/1.73
GLOBULIN UR ELPH-MCNC: 2.9 GM/DL
GLUCOSE BLD-MCNC: 107 MG/DL (ref 65–99)
HCT VFR BLD AUTO: 38.7 % (ref 34–46.6)
HGB BLD-MCNC: 12.9 G/DL (ref 12–15.9)
LYMPHOCYTES # BLD AUTO: 2.7 10*3/MM3 (ref 0.7–3.1)
LYMPHOCYTES NFR BLD AUTO: 38.5 % (ref 19.6–45.3)
MCH RBC QN AUTO: 31.6 PG (ref 26.6–33)
MCHC RBC AUTO-ENTMCNC: 33.2 G/DL (ref 31.5–35.7)
MCV RBC AUTO: 95 FL (ref 79–97)
MONOCYTES # BLD AUTO: 0.5 10*3/MM3 (ref 0.1–0.9)
MONOCYTES NFR BLD AUTO: 6.7 % (ref 5–12)
NEUTROPHILS # BLD AUTO: 3.6 10*3/MM3 (ref 1.7–7)
NEUTROPHILS NFR BLD AUTO: 52.1 % (ref 42.7–76)
NRBC BLD AUTO-RTO: 0.2 /100 WBC (ref 0–0.2)
PLATELET # BLD AUTO: 193 10*3/MM3 (ref 140–450)
PMV BLD AUTO: 8.4 FL (ref 6–12)
POTASSIUM BLD-SCNC: 3.4 MMOL/L (ref 3.5–5.2)
PROT SERPL-MCNC: 7.5 G/DL (ref 6–8.5)
RBC # BLD AUTO: 4.08 10*6/MM3 (ref 3.77–5.28)
SODIUM BLD-SCNC: 138 MMOL/L (ref 136–145)
WBC NRBC COR # BLD: 6.9 10*3/MM3 (ref 3.4–10.8)

## 2020-06-20 PROCEDURE — 85025 COMPLETE CBC W/AUTO DIFF WBC: CPT | Performed by: EMERGENCY MEDICINE

## 2020-06-20 PROCEDURE — 70450 CT HEAD/BRAIN W/O DYE: CPT

## 2020-06-20 PROCEDURE — 99285 EMERGENCY DEPT VISIT HI MDM: CPT

## 2020-06-20 PROCEDURE — 80053 COMPREHEN METABOLIC PANEL: CPT | Performed by: EMERGENCY MEDICINE

## 2020-06-20 PROCEDURE — 93005 ELECTROCARDIOGRAM TRACING: CPT | Performed by: EMERGENCY MEDICINE

## 2020-06-20 RX ORDER — MECLIZINE HYDROCHLORIDE 25 MG/1
25 TABLET ORAL ONCE
Status: COMPLETED | OUTPATIENT
Start: 2020-06-20 | End: 2020-06-20

## 2020-06-20 RX ORDER — MECLIZINE HYDROCHLORIDE 25 MG/1
25 TABLET ORAL EVERY 6 HOURS PRN
Qty: 28 TABLET | Refills: 0 | Status: SHIPPED | OUTPATIENT
Start: 2020-06-20 | End: 2021-06-28

## 2020-06-20 RX ORDER — POTASSIUM CHLORIDE 20 MEQ/1
40 TABLET, EXTENDED RELEASE ORAL ONCE
Status: COMPLETED | OUTPATIENT
Start: 2020-06-20 | End: 2020-06-20

## 2020-06-20 RX ADMIN — POTASSIUM CHLORIDE 40 MEQ: 1500 TABLET, EXTENDED RELEASE ORAL at 23:15

## 2020-06-20 RX ADMIN — MECLIZINE HYDROCHLORIDE 25 MG: 25 TABLET ORAL at 21:21

## 2020-06-21 NOTE — ED PROVIDER NOTES
Subjective   History of Present Illness  49-year-old female presents with complaint woke up with dizziness.  She states she felt like she was confused or a brain freeze.  She described this is more of not sure what was happening.  Symptoms have been intermittent today she reported no localized numbness weakness or paresthesia.  No speech difficulty.  She does not complain of headache.  She does not complain of pain in ear or change in her hearing.  She reports no fever.  She reports that she does have some sinus congestion.  No difficulty breathing.  Patient reports that she has been using apple cider vinegar for diet which has caused her to significantly eat less than normal.  Review of Systems  Negative for fever cough shortness of breath vomiting diarrhea  Past Medical History:   Diagnosis Date   • Hypertension    Hyperlipidemia, reflux    No Known Allergies    Past Surgical History:   Procedure Laterality Date   • TUBAL ABDOMINAL LIGATION         No family history on file.    Social History     Socioeconomic History   • Marital status:      Spouse name: Not on file   • Number of children: Not on file   • Years of education: Not on file   • Highest education level: Not on file   Tobacco Use   • Smoking status: Former Smoker     Years: 15.00   • Smokeless tobacco: Never Used   • Tobacco comment: quit 13 yrs ago   Substance and Sexual Activity   • Alcohol use: Yes     Comment: caffeine 1c qd     Prior to Admission medications    Medication Sig Start Date End Date Taking? Authorizing Provider   albuterol sulfate  (90 Base) MCG/ACT inhaler Inhale 2 puffs Every 6 (Six) Hours As Needed for Wheezing. 4/9/20   Adri Raya MD   atorvastatin (LIPITOR) 20 MG tablet Daily. 10/15/18   Provider, MD Leonor   baclofen (LIORESAL) 10 MG tablet Take 1 tablet by mouth 3 (Three) Times a Day. 3/4/20   Ariane Beaulieu MD   benzonatate (TESSALON) 200 MG capsule Take 1 capsule by mouth 3 (Three) Times a Day  As Needed for Cough. 4/1/20   Sarabjit Sandoval MD   cyclobenzaprine (FLEXERIL) 10 MG tablet Take 1 tablet by mouth 3 (Three) Times a Day As Needed for Muscle Spasms. 4/13/20   Murtaza Smith MD   fluticasone (FLONASE) 50 MCG/ACT nasal spray FLONASE 50 MCG/ACT NASAL SUSPENSION 10/15/18   Leonor Guillen MD   lisinopril-hydrochlorothiazide (PRINZIDE,ZESTORETIC) 20-25 MG per tablet Take 1 tablet by mouth Daily. 5/20/20   Ariane Beaulieu MD   meloxicam (Mobic) 15 MG tablet Take 1 tablet by mouth Daily. Prn pain 4/13/20   Murtaza Smith MD   methylPREDNISolone (Medrol) 4 MG tablet Take as directed on package instructions. 4/9/20   Adri Raya MD   omeprazole (priLOSEC) 20 MG capsule TK ONE C PO D FOR STOMACH ACID 12/7/18   Provider, MD Leonor           Objective   Physical Exam   49-year-old female awake alert.  Generally obese.  Pupils equal round react light extract muscles are intact no nystagmus.  TMsClear bilaterally.  Negative Enoc-Hallpike test. oropharynx clear tongue midline neck supple chest clear equal breath sounds cardiovascular regular in rhythm abdomen soft nontender neurologic exam cranial 2 through 12 grossly intact hands without pronator drift finger-nose intact speech clear motor sensory intact to all extremities.    Procedures     Orthostatic vital signs were obtained and found to have no significant abnormality      ED Course      Results for orders placed or performed during the hospital encounter of 06/20/20   Comprehensive Metabolic Panel   Result Value Ref Range    Glucose 107 (H) 65 - 99 mg/dL    BUN      Creatinine 1.08 (H) 0.57 - 1.00 mg/dL    Sodium 138 136 - 145 mmol/L    Potassium 3.4 (L) 3.5 - 5.2 mmol/L    Chloride 98 98 - 107 mmol/L    CO2 26.0 22.0 - 29.0 mmol/L    Calcium 9.8 8.6 - 10.5 mg/dL    Total Protein 7.5 6.0 - 8.5 g/dL    Albumin 4.60 3.50 - 5.20 g/dL    ALT (SGPT) 24 1 - 33 U/L    AST (SGOT) 28 1 - 32 U/L    Alkaline Phosphatase 61 39 - 117 U/L     "Total Bilirubin 0.3 0.2 - 1.2 mg/dL    eGFR  African Amer 66 >60 mL/min/1.73    Globulin 2.9 gm/dL    A/G Ratio 1.6 g/dL    BUN/Creatinine Ratio      Anion Gap 14.0 5.0 - 15.0 mmol/L   CBC Auto Differential   Result Value Ref Range    WBC 6.90 3.40 - 10.80 10*3/mm3    RBC 4.08 3.77 - 5.28 10*6/mm3    Hemoglobin 12.9 12.0 - 15.9 g/dL    Hematocrit 38.7 34.0 - 46.6 %    MCV 95.0 79.0 - 97.0 fL    MCH 31.6 26.6 - 33.0 pg    MCHC 33.2 31.5 - 35.7 g/dL    RDW 14.9 12.3 - 15.4 %    RDW-SD 49.4 37.0 - 54.0 fl    MPV 8.4 6.0 - 12.0 fL    Platelets 193 140 - 450 10*3/mm3    Neutrophil % 52.1 42.7 - 76.0 %    Lymphocyte % 38.5 19.6 - 45.3 %    Monocyte % 6.7 5.0 - 12.0 %    Eosinophil % 1.6 0.3 - 6.2 %    Basophil % 1.1 0.0 - 1.5 %    Neutrophils, Absolute 3.60 1.70 - 7.00 10*3/mm3    Lymphocytes, Absolute 2.70 0.70 - 3.10 10*3/mm3    Monocytes, Absolute 0.50 0.10 - 0.90 10*3/mm3    Eosinophils, Absolute 0.10 0.00 - 0.40 10*3/mm3    Basophils, Absolute 0.10 0.00 - 0.20 10*3/mm3    nRBC 0.2 0.0 - 0.2 /100 WBC   BUN   Result Value Ref Range    BUN 14 6 - 20 mg/dL     Ct Head Without Contrast    Result Date: 6/20/2020   1.  No acute intracranial abnormality detected.   Castillo Vo MD Neuroradiologist Thank you for this referral.  This exam was interpreted by a fellowship trained neuroradiologist.   SLOT:  68  Electronically signed by:  Castillo Vo  6/20/2020 8:40 PM    Medications   potassium chloride (K-DUR,KLOR-CON) CR tablet 40 mEq (has no administration in time range)   meclizine (ANTIVERT) tablet 25 mg (25 mg Oral Given 6/20/20 2121)     /75   Pulse 85   Temp 98.8 °F (37.1 °C) (Oral)   Resp 18   Ht 170.2 cm (67\")   Wt 111 kg (245 lb)   SpO2 99%   BMI 38.37 kg/m²                                        MDM  Chart review: Recently treated for bronchitis and thoracic back pain  Comorbidity: As per past history  Differential: Vertigo, orthostasis, electrolyte abnormality, stroke felt to be unlikely and that " there is no neurologic findings  My EKG interpretation: Sinus rhythm first-degree AV block no previous to compare to  Lab: Normal CBC comprehensive metabolic panel glucose 107 creatinine 1.08 potassium 3.4  Radiology: CT scan of head was found to be normal, sinuses were clear  Discussion/treatment: Patient's findings were discussed with her.  She will be discharged placed on Antivert.  She was given dose of potassium for her mild hypokalemia.  Advised to avoid sudden position change to follow-up with Dr. Beaulieu return new or worsening symptoms  Patient was evaluated using appropriate PPE      Final diagnoses:   Dizzy   Hypokalemia            Murtaza Smith MD  06/20/20 2033       Murtaza Smith MD  06/20/20 1330

## 2020-06-21 NOTE — ED NOTES
Pt presents with dizziness. Pt reports she started a new diet recently and forgets to eat. Pt reports she woke up this morning dizzy, went to work and still felt dizzy, ate something and felt better for a little bit and then the dizziness returned.      Kaila Kelley RN  06/20/20 [id number]

## 2020-06-21 NOTE — DISCHARGE INSTRUCTIONS
Rest, avoid sudden position change, follow-up with Dr. Beaulieu for repeat evaluation, return new or worsening symptoms

## 2020-07-10 RX ORDER — FLUTICASONE PROPIONATE 50 MCG
2 SPRAY, SUSPENSION (ML) NASAL DAILY
Qty: 3 BOTTLE | Refills: 1 | Status: SHIPPED | OUTPATIENT
Start: 2020-07-10 | End: 2020-08-26 | Stop reason: SDUPTHER

## 2020-07-10 RX ORDER — ATORVASTATIN CALCIUM 20 MG/1
20 TABLET, FILM COATED ORAL NIGHTLY
Qty: 90 TABLET | Refills: 1 | Status: SHIPPED | OUTPATIENT
Start: 2020-07-10 | End: 2021-02-15 | Stop reason: SDUPTHER

## 2020-07-10 RX ORDER — OMEPRAZOLE 20 MG/1
20 CAPSULE, DELAYED RELEASE ORAL DAILY
Qty: 90 CAPSULE | Refills: 1 | Status: SHIPPED | OUTPATIENT
Start: 2020-07-10 | End: 2021-02-15 | Stop reason: SDUPTHER

## 2020-08-26 ENCOUNTER — LAB (OUTPATIENT)
Dept: FAMILY MEDICINE CLINIC | Facility: CLINIC | Age: 50
End: 2020-08-26

## 2020-08-26 ENCOUNTER — OFFICE VISIT (OUTPATIENT)
Dept: FAMILY MEDICINE CLINIC | Facility: CLINIC | Age: 50
End: 2020-08-26

## 2020-08-26 VITALS
WEIGHT: 257 LBS | TEMPERATURE: 97.8 F | SYSTOLIC BLOOD PRESSURE: 136 MMHG | BODY MASS INDEX: 40.25 KG/M2 | OXYGEN SATURATION: 96 % | DIASTOLIC BLOOD PRESSURE: 88 MMHG | HEART RATE: 83 BPM

## 2020-08-26 DIAGNOSIS — Z12.31 ENCOUNTER FOR SCREENING MAMMOGRAM FOR BREAST CANCER: ICD-10-CM

## 2020-08-26 DIAGNOSIS — I10 ESSENTIAL HYPERTENSION: ICD-10-CM

## 2020-08-26 DIAGNOSIS — Z12.11 SCREEN FOR COLON CANCER: ICD-10-CM

## 2020-08-26 DIAGNOSIS — Z00.00 WELLNESS EXAMINATION: Primary | ICD-10-CM

## 2020-08-26 DIAGNOSIS — L72.3 SEBACEOUS CYST: ICD-10-CM

## 2020-08-26 LAB
ALBUMIN SERPL-MCNC: 4.2 G/DL (ref 3.5–5.2)
ALBUMIN/GLOB SERPL: 1.4 G/DL
ALP SERPL-CCNC: 56 U/L (ref 39–117)
ALT SERPL W P-5'-P-CCNC: 28 U/L (ref 1–33)
ANION GAP SERPL CALCULATED.3IONS-SCNC: 10.4 MMOL/L (ref 5–15)
AST SERPL-CCNC: 30 U/L (ref 1–32)
BASOPHILS # BLD AUTO: 0.02 10*3/MM3 (ref 0–0.2)
BASOPHILS NFR BLD AUTO: 0.4 % (ref 0–1.5)
BILIRUB SERPL-MCNC: 0.4 MG/DL (ref 0–1.2)
BUN SERPL-MCNC: 7 MG/DL (ref 6–20)
BUN/CREAT SERPL: 8.1 (ref 7–25)
CALCIUM SPEC-SCNC: 9.5 MG/DL (ref 8.6–10.5)
CHLORIDE SERPL-SCNC: 101 MMOL/L (ref 98–107)
CHOLEST SERPL-MCNC: 201 MG/DL (ref 0–200)
CO2 SERPL-SCNC: 26.6 MMOL/L (ref 22–29)
CREAT SERPL-MCNC: 0.86 MG/DL (ref 0.57–1)
DEPRECATED RDW RBC AUTO: 44.3 FL (ref 37–54)
EOSINOPHIL # BLD AUTO: 0.14 10*3/MM3 (ref 0–0.4)
EOSINOPHIL NFR BLD AUTO: 2.9 % (ref 0.3–6.2)
ERYTHROCYTE [DISTWIDTH] IN BLOOD BY AUTOMATED COUNT: 12.9 % (ref 12.3–15.4)
GFR SERPL CREATININE-BSD FRML MDRD: 86 ML/MIN/1.73
GLOBULIN UR ELPH-MCNC: 2.9 GM/DL
GLUCOSE SERPL-MCNC: 113 MG/DL (ref 65–99)
HCT VFR BLD AUTO: 39.1 % (ref 34–46.6)
HDLC SERPL-MCNC: 56 MG/DL (ref 40–60)
HGB BLD-MCNC: 13 G/DL (ref 12–15.9)
IMM GRANULOCYTES # BLD AUTO: 0.01 10*3/MM3 (ref 0–0.05)
IMM GRANULOCYTES NFR BLD AUTO: 0.2 % (ref 0–0.5)
LDLC SERPL CALC-MCNC: 117 MG/DL (ref 0–100)
LDLC/HDLC SERPL: 2.09 {RATIO}
LYMPHOCYTES # BLD AUTO: 1.93 10*3/MM3 (ref 0.7–3.1)
LYMPHOCYTES NFR BLD AUTO: 39.5 % (ref 19.6–45.3)
MCH RBC QN AUTO: 31.6 PG (ref 26.6–33)
MCHC RBC AUTO-ENTMCNC: 33.2 G/DL (ref 31.5–35.7)
MCV RBC AUTO: 94.9 FL (ref 79–97)
MONOCYTES # BLD AUTO: 0.33 10*3/MM3 (ref 0.1–0.9)
MONOCYTES NFR BLD AUTO: 6.7 % (ref 5–12)
NEUTROPHILS NFR BLD AUTO: 2.46 10*3/MM3 (ref 1.7–7)
NEUTROPHILS NFR BLD AUTO: 50.3 % (ref 42.7–76)
NRBC BLD AUTO-RTO: 0 /100 WBC (ref 0–0.2)
PLATELET # BLD AUTO: 193 10*3/MM3 (ref 140–450)
PMV BLD AUTO: 11 FL (ref 6–12)
POTASSIUM SERPL-SCNC: 4.1 MMOL/L (ref 3.5–5.2)
PROT SERPL-MCNC: 7.1 G/DL (ref 6–8.5)
RBC # BLD AUTO: 4.12 10*6/MM3 (ref 3.77–5.28)
SODIUM SERPL-SCNC: 138 MMOL/L (ref 136–145)
TRIGL SERPL-MCNC: 141 MG/DL (ref 0–150)
VLDLC SERPL-MCNC: 28.2 MG/DL (ref 5–40)
WBC # BLD AUTO: 4.89 10*3/MM3 (ref 3.4–10.8)

## 2020-08-26 PROCEDURE — 80061 LIPID PANEL: CPT | Performed by: FAMILY MEDICINE

## 2020-08-26 PROCEDURE — 85025 COMPLETE CBC W/AUTO DIFF WBC: CPT | Performed by: FAMILY MEDICINE

## 2020-08-26 PROCEDURE — 99396 PREV VISIT EST AGE 40-64: CPT | Performed by: FAMILY MEDICINE

## 2020-08-26 PROCEDURE — 36415 COLL VENOUS BLD VENIPUNCTURE: CPT | Performed by: FAMILY MEDICINE

## 2020-08-26 PROCEDURE — 80053 COMPREHEN METABOLIC PANEL: CPT | Performed by: FAMILY MEDICINE

## 2020-08-26 RX ORDER — CYCLOBENZAPRINE HCL 10 MG
10 TABLET ORAL 3 TIMES DAILY PRN
Qty: 30 TABLET | Refills: 1 | Status: SHIPPED | OUTPATIENT
Start: 2020-08-26 | End: 2021-06-28

## 2020-08-26 RX ORDER — FLUTICASONE PROPIONATE 50 MCG
2 SPRAY, SUSPENSION (ML) NASAL DAILY
Qty: 3 BOTTLE | Refills: 1 | Status: SHIPPED | OUTPATIENT
Start: 2020-08-26 | End: 2021-06-04 | Stop reason: SDUPTHER

## 2020-08-26 RX ORDER — CYCLOBENZAPRINE HCL 10 MG
10 TABLET ORAL 3 TIMES DAILY PRN
Qty: 30 TABLET | Refills: 1 | Status: SHIPPED | OUTPATIENT
Start: 2020-08-26 | End: 2020-08-26 | Stop reason: SDUPTHER

## 2020-08-26 NOTE — PROGRESS NOTES
Tyesha Appiah is a 49 y.o. female and is here for a comprehensive physical exam. The patient reports no problems.    Do you take any herbs or supplements that were not prescribed by a doctor? no  Are you taking calcium supplements? no  Are you taking aspirin daily? no    The following portions of the patient's history were reviewed and updated as appropriate: allergies, current medications, past family history, past medical history, past social history, past surgical history and problem list.    Review of Systems  Do you have pain that bothers you in your daily life? no  A comprehensive review of systems was negative.    Objective   /88 (BP Location: Left arm, Patient Position: Sitting, Cuff Size: Adult)   Pulse 83   Temp 97.8 °F (36.6 °C) (Tympanic)   Wt 117 kg (257 lb)   SpO2 96%   BMI 40.25 kg/m²   General appearance: alert, appears stated age and cooperative  Head: Normocephalic, without obvious abnormality, atraumatic  Eyes: conjunctivae/corneas clear. PERRL, EOM's intact. Fundi benign.  Ears: normal TM's and external ear canals both ears  Neck: no adenopathy, supple, symmetrical, trachea midline and thyroid not enlarged, symmetric, no tenderness/mass/nodules  Lungs: clear to auscultation bilaterally  Heart: regular rate and rhythm, S1, S2 normal, no murmur, click, rub or gallop  Extremities: extremities normal, atraumatic, no cyanosis or edema  Skin: sebaceous cyst left shoulder near bra strap  Neurologic: Grossly normal     No visits with results within 1 Week(s) from this visit.   Latest known visit with results is:   Admission on 06/20/2020, Discharged on 06/20/2020   Component Date Value Ref Range Status   • Glucose 06/20/2020 107* 65 - 99 mg/dL Final   • BUN 06/20/2020    Final    Testing performed by alternate method   • Creatinine 06/20/2020 1.08* 0.57 - 1.00 mg/dL Final   • Sodium 06/20/2020 138  136 - 145 mmol/L Final   • Potassium 06/20/2020 3.4* 3.5 - 5.2 mmol/L Final   •  Chloride 06/20/2020 98  98 - 107 mmol/L Final   • CO2 06/20/2020 26.0  22.0 - 29.0 mmol/L Final   • Calcium 06/20/2020 9.8  8.6 - 10.5 mg/dL Final   • Total Protein 06/20/2020 7.5  6.0 - 8.5 g/dL Final   • Albumin 06/20/2020 4.60  3.50 - 5.20 g/dL Final   • ALT (SGPT) 06/20/2020 24  1 - 33 U/L Final   • AST (SGOT) 06/20/2020 28  1 - 32 U/L Final   • Alkaline Phosphatase 06/20/2020 61  39 - 117 U/L Final   • Total Bilirubin 06/20/2020 0.3  0.2 - 1.2 mg/dL Final   • eGFR   Amer 06/20/2020 66  >60 mL/min/1.73 Final   • Globulin 06/20/2020 2.9  gm/dL Final   • A/G Ratio 06/20/2020 1.6  g/dL Final   • BUN/Creatinine Ratio 06/20/2020    Final    Testing not performed   • Anion Gap 06/20/2020 14.0  5.0 - 15.0 mmol/L Final   • WBC 06/20/2020 6.90  3.40 - 10.80 10*3/mm3 Final   • RBC 06/20/2020 4.08  3.77 - 5.28 10*6/mm3 Final   • Hemoglobin 06/20/2020 12.9  12.0 - 15.9 g/dL Final   • Hematocrit 06/20/2020 38.7  34.0 - 46.6 % Final   • MCV 06/20/2020 95.0  79.0 - 97.0 fL Final   • MCH 06/20/2020 31.6  26.6 - 33.0 pg Final   • MCHC 06/20/2020 33.2  31.5 - 35.7 g/dL Final   • RDW 06/20/2020 14.9  12.3 - 15.4 % Final   • RDW-SD 06/20/2020 49.4  37.0 - 54.0 fl Final   • MPV 06/20/2020 8.4  6.0 - 12.0 fL Final   • Platelets 06/20/2020 193  140 - 450 10*3/mm3 Final   • Neutrophil % 06/20/2020 52.1  42.7 - 76.0 % Final   • Lymphocyte % 06/20/2020 38.5  19.6 - 45.3 % Final   • Monocyte % 06/20/2020 6.7  5.0 - 12.0 % Final   • Eosinophil % 06/20/2020 1.6  0.3 - 6.2 % Final   • Basophil % 06/20/2020 1.1  0.0 - 1.5 % Final   • Neutrophils, Absolute 06/20/2020 3.60  1.70 - 7.00 10*3/mm3 Final   • Lymphocytes, Absolute 06/20/2020 2.70  0.70 - 3.10 10*3/mm3 Final   • Monocytes, Absolute 06/20/2020 0.50  0.10 - 0.90 10*3/mm3 Final   • Eosinophils, Absolute 06/20/2020 0.10  0.00 - 0.40 10*3/mm3 Final   • Basophils, Absolute 06/20/2020 0.10  0.00 - 0.20 10*3/mm3 Final   • nRBC 06/20/2020 0.2  0.0 - 0.2 /100 WBC Final   • BUN  06/20/2020 14  6 - 20 mg/dL Final       Assessment/Plan   Healthy female exam.   Aisha was seen today for follow-up.    Diagnoses and all orders for this visit:    Wellness examination  -     Comprehensive Metabolic Panel  -     Lipid Panel  -     CBC & Differential  -     Mammo Screening Digital Tomosynthesis Bilateral With CAD  -     Ambulatory Referral For Screening Colonoscopy  -     CBC Auto Differential    Sebaceous cyst  -     Ambulatory Referral to General Surgery  -     CBC & Differential  -     CBC Auto Differential    Essential hypertension  -     Comprehensive Metabolic Panel  -     Lipid Panel  -     CBC & Differential  -     CBC Auto Differential    Encounter for screening mammogram for breast cancer  -     Mammo Screening Digital Tomosynthesis Bilateral With CAD    Screen for colon cancer  -     Ambulatory Referral For Screening Colonoscopy    Other orders  -     Discontinue: cyclobenzaprine (FLEXERIL) 10 MG tablet; Take 1 tablet by mouth 3 (Three) Times a Day As Needed for Muscle Spasms.  -     fluticasone (Flonase) 50 MCG/ACT nasal spray; 2 sprays into the nostril(s) as directed by provider Daily.  -     cyclobenzaprine (FLEXERIL) 10 MG tablet; Take 1 tablet by mouth 3 (Three) Times a Day As Needed for Muscle Spasms.      1. Wellness exam  2. Patient Counseling:  --Nutrition: Stressed importance of moderation in sodium/caffeine intake, saturated fat and cholesterol, caloric balance, sufficient intake of fresh fruits, vegetables, fiber, calcium, iron, and 1 mg of folate supplement per day (for females capable of pregnancy).  --Exercise: Stressed the importance of regular exercise.   --Dental health: Discussed importance of regular tooth brushing, flossing, and dental visits.  --Immunizations reviewed.  --Discussed benefits of screening colonoscopy.  -  3. Discussed the patient's BMI with her.  The BMI is above average; BMI management plan is completed  4. Follow up in one year

## 2020-08-31 ENCOUNTER — TELEPHONE (OUTPATIENT)
Dept: FAMILY MEDICINE CLINIC | Facility: CLINIC | Age: 50
End: 2020-08-31

## 2020-08-31 NOTE — TELEPHONE ENCOUNTER
Pt c/o being stung by a bee and the stinger is still in the heel of her foot. It itches and burns x 1 wk. What can she do? Does she need a oral antibx or some kind of cream?

## 2020-08-31 NOTE — TELEPHONE ENCOUNTER
The stinger needs to come out.  She can soak her foot in warm water and Epsom salts to try to soften the skin and get the stinger out.  If this does not work she will need to come in to see if I can get it out.

## 2020-09-02 ENCOUNTER — OFFICE VISIT (OUTPATIENT)
Dept: SURGERY | Facility: CLINIC | Age: 50
End: 2020-09-02

## 2020-09-02 ENCOUNTER — OFFICE VISIT (OUTPATIENT)
Dept: FAMILY MEDICINE CLINIC | Facility: CLINIC | Age: 50
End: 2020-09-02

## 2020-09-02 VITALS
OXYGEN SATURATION: 97 % | WEIGHT: 251.6 LBS | HEART RATE: 88 BPM | SYSTOLIC BLOOD PRESSURE: 115 MMHG | HEIGHT: 66 IN | BODY MASS INDEX: 40.43 KG/M2 | DIASTOLIC BLOOD PRESSURE: 80 MMHG | TEMPERATURE: 96.9 F

## 2020-09-02 VITALS
SYSTOLIC BLOOD PRESSURE: 124 MMHG | HEART RATE: 90 BPM | BODY MASS INDEX: 39.47 KG/M2 | TEMPERATURE: 97.5 F | OXYGEN SATURATION: 97 % | DIASTOLIC BLOOD PRESSURE: 84 MMHG | WEIGHT: 252 LBS

## 2020-09-02 DIAGNOSIS — T63.441A BEE STING, ACCIDENTAL OR UNINTENTIONAL, INITIAL ENCOUNTER: Primary | ICD-10-CM

## 2020-09-02 DIAGNOSIS — L72.3 SEBACEOUS CYST: Primary | ICD-10-CM

## 2020-09-02 DIAGNOSIS — L03.116 CELLULITIS OF LEFT FOOT: ICD-10-CM

## 2020-09-02 PROCEDURE — 99204 OFFICE O/P NEW MOD 45 MIN: CPT | Performed by: SURGERY

## 2020-09-02 PROCEDURE — 99213 OFFICE O/P EST LOW 20 MIN: CPT | Performed by: FAMILY MEDICINE

## 2020-09-02 RX ORDER — CEPHALEXIN 500 MG/1
500 CAPSULE ORAL 3 TIMES DAILY
Qty: 21 CAPSULE | Refills: 0 | Status: SHIPPED | OUTPATIENT
Start: 2020-09-02 | End: 2020-11-06

## 2020-09-02 RX ORDER — CHLORHEXIDINE GLUCONATE 0.12 MG/ML
15 RINSE ORAL EVERY 12 HOURS SCHEDULED
Status: CANCELLED | OUTPATIENT
Start: 2020-09-02

## 2020-09-02 RX ORDER — PREDNISONE 10 MG/1
10 TABLET ORAL 2 TIMES DAILY
Qty: 10 TABLET | Refills: 0 | Status: SHIPPED | OUTPATIENT
Start: 2020-09-02 | End: 2020-11-06

## 2020-09-02 RX ORDER — SODIUM CHLORIDE 9 MG/ML
100 INJECTION, SOLUTION INTRAVENOUS CONTINUOUS
Status: CANCELLED | OUTPATIENT
Start: 2020-09-02

## 2020-09-02 NOTE — PROGRESS NOTES
Subjective   Chief Complaint   Patient presents with   • Insect Bite     bee sting in heel of Lt foot x 2 wks     Aisha Appiah is a 49 y.o. female.     She was mowing her lawn and got stung by some bees that flew up out of the ground.  She applied some anti-itch cream but it has not helped much and she continues to have itching and redness of her left heel/foot.    Insect Bite   This is a new problem. The current episode started 1 to 4 weeks ago. The problem occurs constantly. The problem has been gradually worsening. Associated symptoms include a rash. Pertinent negatives include no abdominal pain, chest pain, chills, coughing, fever, headaches, joint swelling, sore throat or swollen glands. Exacerbated by: scratching. Treatments tried: topical anti-itch cream. The treatment provided mild relief.      Past Medical History:   Diagnosis Date   • Hypertension      Past Surgical History:   Procedure Laterality Date   • TUBAL ABDOMINAL LIGATION       No Known Allergies  Social History     Socioeconomic History   • Marital status:      Spouse name: Not on file   • Number of children: Not on file   • Years of education: Not on file   • Highest education level: Not on file   Tobacco Use   • Smoking status: Former Smoker     Years: 15.00   • Smokeless tobacco: Never Used   • Tobacco comment: quit 13 yrs ago   Substance and Sexual Activity   • Alcohol use: Yes     Comment: caffeine 1c qd     Social History     Tobacco Use   Smoking Status Former Smoker   • Years: 15.00   Smokeless Tobacco Never Used   Tobacco Comment    quit 13 yrs ago       family history is not on file.  Current Outpatient Medications on File Prior to Visit   Medication Sig Dispense Refill   • albuterol sulfate  (90 Base) MCG/ACT inhaler Inhale 2 puffs Every 6 (Six) Hours As Needed for Wheezing. 1 inhaler 0   • atorvastatin (Lipitor) 20 MG tablet Take 1 tablet by mouth Every Night. 90 tablet 1   • cyclobenzaprine (FLEXERIL) 10 MG tablet  Take 1 tablet by mouth 3 (Three) Times a Day As Needed for Muscle Spasms. 30 tablet 1   • fluticasone (Flonase) 50 MCG/ACT nasal spray 2 sprays into the nostril(s) as directed by provider Daily. 3 bottle 1   • lisinopril-hydrochlorothiazide (PRINZIDE,ZESTORETIC) 20-25 MG per tablet Take 1 tablet by mouth Daily. 90 tablet 1   • meclizine (ANTIVERT) 25 MG tablet Take 1 tablet by mouth Every 6 (Six) Hours As Needed for Dizziness. 28 tablet 0   • meloxicam (Mobic) 15 MG tablet Take 1 tablet by mouth Daily. Prn pain 10 tablet 0   • omeprazole (priLOSEC) 20 MG capsule Take 1 capsule by mouth Daily. 90 capsule 1   • [DISCONTINUED] benzonatate (TESSALON) 200 MG capsule Take 1 capsule by mouth 3 (Three) Times a Day As Needed for Cough. 20 capsule 0   • [DISCONTINUED] methylPREDNISolone (Medrol) 4 MG tablet Take as directed on package instructions. 1 each 0     No current facility-administered medications on file prior to visit.      Patient Active Problem List   Diagnosis   • Depression   • Gastroesophageal reflux disease   • Hyperlipidemia   • Hypertension   • Varicose veins of lower extremity   • Lower back pain   • Inflamed skin tag   • Bronchitis   • Wellness examination   • Sebaceous cyst   • Encounter for screening mammogram for breast cancer   • Screen for colon cancer   • Cellulitis of left foot   • Bee sting       The following portions of the patient's history were reviewed and updated as appropriate: allergies, current medications, past family history, past medical history, past social history, past surgical history and problem list.    Review of Systems   Constitutional: Negative for chills and fever.   HENT: Negative for sinus pressure, sore throat and swollen glands.    Eyes: Negative for blurred vision.   Respiratory: Negative for cough and shortness of breath.    Cardiovascular: Negative for chest pain and palpitations.   Gastrointestinal: Negative for abdominal pain.   Endocrine: Negative for polyuria.    Musculoskeletal: Negative for joint swelling.   Skin: Positive for rash.   Neurological: Negative for dizziness and headache.   Hematological: Negative for adenopathy.   Psychiatric/Behavioral: Negative for depressed mood.       Objective   /84 (BP Location: Left arm, Patient Position: Sitting, Cuff Size: Large Adult)   Pulse 90   Temp 97.5 °F (36.4 °C)   Wt 114 kg (252 lb)   SpO2 97%   BMI 39.47 kg/m²   Physical Exam   Constitutional: She is oriented to person, place, and time. She appears well-developed. No distress.   HENT:   Head: Normocephalic.   Eyes: Conjunctivae and lids are normal.   Neck: Trachea normal. No thyroid mass and no thyromegaly present.   Cardiovascular: Normal rate, regular rhythm and normal heart sounds.   Pulmonary/Chest: Effort normal and breath sounds normal.   Lymphadenopathy:     She has no cervical adenopathy.   Neurological: She is alert and oriented to person, place, and time.   Skin: Skin is warm and dry. Rash noted. There is erythema.   Left heel and medial foot with redness and swelling and warmth   Psychiatric: She has a normal mood and affect. Her speech is normal and behavior is normal. She is attentive.       No visits with results within 1 Week(s) from this visit.   Latest known visit with results is:   Office Visit on 08/26/2020   Component Date Value Ref Range Status   • Glucose 08/26/2020 113* 65 - 99 mg/dL Final   • BUN 08/26/2020 7  6 - 20 mg/dL Final   • Creatinine 08/26/2020 0.86  0.57 - 1.00 mg/dL Final   • Sodium 08/26/2020 138  136 - 145 mmol/L Final   • Potassium 08/26/2020 4.1  3.5 - 5.2 mmol/L Final   • Chloride 08/26/2020 101  98 - 107 mmol/L Final   • CO2 08/26/2020 26.6  22.0 - 29.0 mmol/L Final   • Calcium 08/26/2020 9.5  8.6 - 10.5 mg/dL Final   • Total Protein 08/26/2020 7.1  6.0 - 8.5 g/dL Final   • Albumin 08/26/2020 4.20  3.50 - 5.20 g/dL Final   • ALT (SGPT) 08/26/2020 28  1 - 33 U/L Final   • AST (SGOT) 08/26/2020 30  1 - 32 U/L Final   •  Alkaline Phosphatase 08/26/2020 56  39 - 117 U/L Final   • Total Bilirubin 08/26/2020 0.4  0.0 - 1.2 mg/dL Final   • eGFR   Amer 08/26/2020 86  >60 mL/min/1.73 Final   • Globulin 08/26/2020 2.9  gm/dL Final   • A/G Ratio 08/26/2020 1.4  g/dL Final   • BUN/Creatinine Ratio 08/26/2020 8.1  7.0 - 25.0 Final   • Anion Gap 08/26/2020 10.4  5.0 - 15.0 mmol/L Final   • Total Cholesterol 08/26/2020 201* 0 - 200 mg/dL Final   • Triglycerides 08/26/2020 141  0 - 150 mg/dL Final   • HDL Cholesterol 08/26/2020 56  40 - 60 mg/dL Final   • LDL Cholesterol  08/26/2020 117* 0 - 100 mg/dL Final   • VLDL Cholesterol 08/26/2020 28.2  5 - 40 mg/dL Final   • LDL/HDL Ratio 08/26/2020 2.09   Final   • WBC 08/26/2020 4.89  3.40 - 10.80 10*3/mm3 Final   • RBC 08/26/2020 4.12  3.77 - 5.28 10*6/mm3 Final   • Hemoglobin 08/26/2020 13.0  12.0 - 15.9 g/dL Final   • Hematocrit 08/26/2020 39.1  34.0 - 46.6 % Final   • MCV 08/26/2020 94.9  79.0 - 97.0 fL Final   • MCH 08/26/2020 31.6  26.6 - 33.0 pg Final   • MCHC 08/26/2020 33.2  31.5 - 35.7 g/dL Final   • RDW 08/26/2020 12.9  12.3 - 15.4 % Final   • RDW-SD 08/26/2020 44.3  37.0 - 54.0 fl Final   • MPV 08/26/2020 11.0  6.0 - 12.0 fL Final   • Platelets 08/26/2020 193  140 - 450 10*3/mm3 Final   • Neutrophil % 08/26/2020 50.3  42.7 - 76.0 % Final   • Lymphocyte % 08/26/2020 39.5  19.6 - 45.3 % Final   • Monocyte % 08/26/2020 6.7  5.0 - 12.0 % Final   • Eosinophil % 08/26/2020 2.9  0.3 - 6.2 % Final   • Basophil % 08/26/2020 0.4  0.0 - 1.5 % Final   • Immature Grans % 08/26/2020 0.2  0.0 - 0.5 % Final   • Neutrophils, Absolute 08/26/2020 2.46  1.70 - 7.00 10*3/mm3 Final   • Lymphocytes, Absolute 08/26/2020 1.93  0.70 - 3.10 10*3/mm3 Final   • Monocytes, Absolute 08/26/2020 0.33  0.10 - 0.90 10*3/mm3 Final   • Eosinophils, Absolute 08/26/2020 0.14  0.00 - 0.40 10*3/mm3 Final   • Basophils, Absolute 08/26/2020 0.02  0.00 - 0.20 10*3/mm3 Final   • Immature Grans, Absolute 08/26/2020 0.01  0.00 -  0.05 10*3/mm3 Final   • [facility] 08/26/2020 0.0  0.0 - 0.2 /100 WBC Final           Assessment/Plan   Aisha was seen today for insect bite.    Diagnoses and all orders for this visit:    Bee sting, accidental or unintentional, initial encounter  -     predniSONE (DELTASONE) 10 MG tablet; Take 1 tablet by mouth 2 (Two) Times a Day.    Cellulitis of left foot  -     cephalexin (Keflex) 500 MG capsule; Take 1 capsule by mouth 3 (Three) Times a Day.

## 2020-09-03 NOTE — PROGRESS NOTES
GENERAL SURGERY CONSULTATION NOTE    Consult requested by: Dr. Beaulieu    Patient Care Team:  Ariane Beaulieu MD as PCP - General (Family Medicine)    Reason for consult: Sebaceous cyst of the left shoulder    Subjective     Patient is a 49 y.o. female presents with a persistent sebaceous cyst on her left shoulder which causes her some discomfort.  She states that it has been there for many years and she goes through periods where the area will become swollen, painful, tender, and drain a small amount of dark material.  She states that it has not done this for a couple of weeks, but usually when it does do there she is started on antibiotics and it usually resolves.  She will use heating pads occasionally to help draw out some of the fluid.  The biggest problem is that the location of the cyst tends to be in the area of the strap of her brassiere which causes significant amounts of irritation.  She is tired of having this area and wishes to have it removed.    Review of Systems   Constitutional: Negative for appetite change, chills and fever.   HENT: Negative for congestion and sore throat.    Respiratory: Negative for cough and shortness of breath.    Cardiovascular: Negative for chest pain and palpitations.   Gastrointestinal: Negative for abdominal pain, constipation, diarrhea, nausea, vomiting and GERD.   Genitourinary: Negative for difficulty urinating, dysuria and frequency.   Musculoskeletal: Negative for arthralgias and back pain.   Skin: Positive for skin lesions. Negative for rash.   Neurological: Negative for dizziness, seizures and memory problem.   Hematological: Negative for adenopathy. Does not bruise/bleed easily.   Psychiatric/Behavioral: Negative for sleep disturbance and depressed mood.        History  Past Medical History:   Diagnosis Date   • Hypertension      Past Surgical History:   Procedure Laterality Date   • TUBAL ABDOMINAL LIGATION       History reviewed. No pertinent family  history.  Social History     Tobacco Use   • Smoking status: Former Smoker     Years: 15.00   • Smokeless tobacco: Never Used   • Tobacco comment: quit 13 yrs ago   Substance Use Topics   • Alcohol use: Yes     Comment: caffeine 1c qd   • Drug use: Not on file       (Not in a hospital admission)  Allergies:  Patient has no known allergies.    Objective     Vital Signs       Physical Exam   Constitutional: She is oriented to person, place, and time. She appears well-developed and well-nourished.   HENT:   Head: Normocephalic and atraumatic.   Eyes: Pupils are equal, round, and reactive to light.   Neck: Normal range of motion.   Cardiovascular: Normal rate and regular rhythm.   Pulmonary/Chest: Effort normal and breath sounds normal.   Abdominal: Soft. She exhibits no distension. There is no tenderness. No hernia.   Musculoskeletal: Normal range of motion. She exhibits no edema.   Lymphadenopathy:     She has no cervical adenopathy.     She has no axillary adenopathy.   Neurological: She is alert and oriented to person, place, and time.   Skin: Skin is warm and dry. No rash noted.   On the posterior/superior aspect of the left shoulder the patient has a 2 cm sebaceous cyst with a central pit with some black debris within the pit.  It is not inflamed, and does not have any surrounding erythema or induration to suggest active infection at this time.   Psychiatric: She has a normal mood and affect.   Vitals reviewed.      Results Review:   Lab Results (last 24 hours)     ** No results found for the last 24 hours. **        No radiology results for the last day      I reviewed the patient's new imaging results and agree with the interpretation.  I reviewed the patient's other test results and agree with the interpretation    Assessment/Plan     Active Problems:  Sebaceous cyst of the skin of the left shoulder    Discussed with patient her surgical options which would include excision and closure.  We reviewed the risks  of surgery which include risks of anesthesia, bleeding, infection, pain, and wound healing complications.  The patient understands, and agrees to proceed with surgery as she is tired of having this recurrent sebaceous cyst on her left shoulder.  We will schedule her in the near future.    Charlie Choi MD  09/03/20  16:05

## 2020-11-06 ENCOUNTER — OFFICE VISIT (OUTPATIENT)
Dept: FAMILY MEDICINE CLINIC | Facility: CLINIC | Age: 50
End: 2020-11-06

## 2020-11-06 ENCOUNTER — TELEPHONE (OUTPATIENT)
Dept: FAMILY MEDICINE CLINIC | Facility: CLINIC | Age: 50
End: 2020-11-06

## 2020-11-06 VITALS
SYSTOLIC BLOOD PRESSURE: 151 MMHG | WEIGHT: 259 LBS | OXYGEN SATURATION: 95 % | TEMPERATURE: 98.2 F | BODY MASS INDEX: 41.8 KG/M2 | DIASTOLIC BLOOD PRESSURE: 101 MMHG | HEART RATE: 90 BPM

## 2020-11-06 DIAGNOSIS — M54.31 SCIATICA OF RIGHT SIDE: Primary | ICD-10-CM

## 2020-11-06 PROCEDURE — 96372 THER/PROPH/DIAG INJ SC/IM: CPT | Performed by: FAMILY MEDICINE

## 2020-11-06 PROCEDURE — 99213 OFFICE O/P EST LOW 20 MIN: CPT | Performed by: FAMILY MEDICINE

## 2020-11-06 RX ORDER — METHYLPREDNISOLONE ACETATE 80 MG/ML
80 INJECTION, SUSPENSION INTRA-ARTICULAR; INTRALESIONAL; INTRAMUSCULAR; SOFT TISSUE ONCE
Status: COMPLETED | OUTPATIENT
Start: 2020-11-06 | End: 2020-11-06

## 2020-11-06 RX ADMIN — METHYLPREDNISOLONE ACETATE 80 MG: 80 INJECTION, SUSPENSION INTRA-ARTICULAR; INTRALESIONAL; INTRAMUSCULAR; SOFT TISSUE at 14:52

## 2020-11-06 NOTE — TELEPHONE ENCOUNTER
Pt c/o sciatic pain and the muscle relaxers, icy hot, stretching are not giving her any relief. What can be done? This is affecting her job.

## 2020-11-06 NOTE — PROGRESS NOTES
Subjective   Chief Complaint   Patient presents with   • Sciatica     Aisha Appiah is a 49 y.o. female.     She has used topical otc patches, otc topical creams, muscle relaxers without relief.     Sciatica  This is a new problem. The current episode started in the past 7 days. The problem occurs constantly. The problem has been gradually worsening. Associated symptoms include myalgias. Pertinent negatives include no abdominal pain, chest pain, chills, coughing, fever, rash, sore throat, swollen glands or weakness. She has tried NSAIDs, rest and position changes for the symptoms.      Past Medical History:   Diagnosis Date   • Hypertension      Past Surgical History:   Procedure Laterality Date   • TUBAL ABDOMINAL LIGATION       No Known Allergies  Social History     Socioeconomic History   • Marital status: Single     Spouse name: Not on file   • Number of children: Not on file   • Years of education: Not on file   • Highest education level: Not on file   Tobacco Use   • Smoking status: Former Smoker     Years: 15.00   • Smokeless tobacco: Never Used   • Tobacco comment: quit 13 yrs ago   Substance and Sexual Activity   • Alcohol use: Yes     Comment: caffeine 1c qd     Social History     Tobacco Use   Smoking Status Former Smoker   • Years: 15.00   Smokeless Tobacco Never Used   Tobacco Comment    quit 13 yrs ago       family history is not on file.  Current Outpatient Medications on File Prior to Visit   Medication Sig Dispense Refill   • albuterol sulfate  (90 Base) MCG/ACT inhaler Inhale 2 puffs Every 6 (Six) Hours As Needed for Wheezing. 1 inhaler 0   • atorvastatin (Lipitor) 20 MG tablet Take 1 tablet by mouth Every Night. 90 tablet 1   • cyclobenzaprine (FLEXERIL) 10 MG tablet Take 1 tablet by mouth 3 (Three) Times a Day As Needed for Muscle Spasms. 30 tablet 1   • fluticasone (Flonase) 50 MCG/ACT nasal spray 2 sprays into the nostril(s) as directed by provider Daily. 3 bottle 1   • meclizine  (ANTIVERT) 25 MG tablet Take 1 tablet by mouth Every 6 (Six) Hours As Needed for Dizziness. 28 tablet 0   • omeprazole (priLOSEC) 20 MG capsule Take 1 capsule by mouth Daily. 90 capsule 1     No current facility-administered medications on file prior to visit.      Patient Active Problem List   Diagnosis   • Depression   • Gastroesophageal reflux disease   • Hyperlipidemia   • Hypertension   • Varicose veins of lower extremity   • Lower back pain   • Inflamed skin tag   • Bronchitis   • Wellness examination   • Sebaceous cyst   • Encounter for screening mammogram for breast cancer   • Screen for colon cancer   • Cellulitis of left foot   • Bee sting       The following portions of the patient's history were reviewed and updated as appropriate: allergies, current medications, past family history, past medical history, past social history, past surgical history and problem list.    Review of Systems   Constitutional: Negative for chills and fever.   HENT: Negative for sinus pressure, sore throat and swollen glands.    Eyes: Negative for blurred vision.   Respiratory: Negative for cough, shortness of breath and wheezing.    Cardiovascular: Negative for chest pain and palpitations.   Gastrointestinal: Negative for abdominal pain.   Endocrine: Negative for polyuria.   Genitourinary: Negative for difficulty urinating.   Musculoskeletal: Positive for myalgias.   Skin: Negative for rash.   Neurological: Negative for dizziness, seizures, weakness and headache.   Hematological: Negative for adenopathy.   Psychiatric/Behavioral: Negative for depressed mood.       Objective   BP (!) 151/101 (BP Location: Right arm, Patient Position: Sitting, Cuff Size: Adult) Comment: just took med  Pulse 90   Temp 98.2 °F (36.8 °C)   Wt 117 kg (259 lb)   SpO2 95%   BMI 41.80 kg/m²   Physical Exam  Constitutional:       General: She is not in acute distress.     Appearance: She is well-developed.   HENT:      Head: Normocephalic.   Eyes:       General: Lids are normal.      Conjunctiva/sclera: Conjunctivae normal.   Neck:      Musculoskeletal: Normal range of motion.      Thyroid: No thyroid mass or thyromegaly.      Trachea: Trachea normal.   Cardiovascular:      Rate and Rhythm: Normal rate and regular rhythm.      Heart sounds: Normal heart sounds.   Pulmonary:      Effort: Pulmonary effort is normal.      Breath sounds: Normal breath sounds.   Abdominal:      Palpations: Abdomen is soft.   Musculoskeletal:      Lumbar back: She exhibits tenderness.   Lymphadenopathy:      Cervical: No cervical adenopathy.   Skin:     General: Skin is warm and dry.   Neurological:      Mental Status: She is alert and oriented to person, place, and time.   Psychiatric:         Attention and Perception: She is attentive.         Mood and Affect: Mood normal.         Speech: Speech normal.         Behavior: Behavior normal.         No visits with results within 1 Week(s) from this visit.   Latest known visit with results is:   Office Visit on 08/26/2020   Component Date Value Ref Range Status   • Glucose 08/26/2020 113* 65 - 99 mg/dL Final   • BUN 08/26/2020 7  6 - 20 mg/dL Final   • Creatinine 08/26/2020 0.86  0.57 - 1.00 mg/dL Final   • Sodium 08/26/2020 138  136 - 145 mmol/L Final   • Potassium 08/26/2020 4.1  3.5 - 5.2 mmol/L Final   • Chloride 08/26/2020 101  98 - 107 mmol/L Final   • CO2 08/26/2020 26.6  22.0 - 29.0 mmol/L Final   • Calcium 08/26/2020 9.5  8.6 - 10.5 mg/dL Final   • Total Protein 08/26/2020 7.1  6.0 - 8.5 g/dL Final   • Albumin 08/26/2020 4.20  3.50 - 5.20 g/dL Final   • ALT (SGPT) 08/26/2020 28  1 - 33 U/L Final   • AST (SGOT) 08/26/2020 30  1 - 32 U/L Final   • Alkaline Phosphatase 08/26/2020 56  39 - 117 U/L Final   • Total Bilirubin 08/26/2020 0.4  0.0 - 1.2 mg/dL Final   • eGFR   Amer 08/26/2020 86  >60 mL/min/1.73 Final   • Globulin 08/26/2020 2.9  gm/dL Final   • A/G Ratio 08/26/2020 1.4  g/dL Final   • BUN/Creatinine Ratio  08/26/2020 8.1  7.0 - 25.0 Final   • Anion Gap 08/26/2020 10.4  5.0 - 15.0 mmol/L Final   • Total Cholesterol 08/26/2020 201* 0 - 200 mg/dL Final   • Triglycerides 08/26/2020 141  0 - 150 mg/dL Final   • HDL Cholesterol 08/26/2020 56  40 - 60 mg/dL Final   • LDL Cholesterol  08/26/2020 117* 0 - 100 mg/dL Final   • VLDL Cholesterol 08/26/2020 28.2  5 - 40 mg/dL Final   • LDL/HDL Ratio 08/26/2020 2.09   Final   • WBC 08/26/2020 4.89  3.40 - 10.80 10*3/mm3 Final   • RBC 08/26/2020 4.12  3.77 - 5.28 10*6/mm3 Final   • Hemoglobin 08/26/2020 13.0  12.0 - 15.9 g/dL Final   • Hematocrit 08/26/2020 39.1  34.0 - 46.6 % Final   • MCV 08/26/2020 94.9  79.0 - 97.0 fL Final   • MCH 08/26/2020 31.6  26.6 - 33.0 pg Final   • MCHC 08/26/2020 33.2  31.5 - 35.7 g/dL Final   • RDW 08/26/2020 12.9  12.3 - 15.4 % Final   • RDW-SD 08/26/2020 44.3  37.0 - 54.0 fl Final   • MPV 08/26/2020 11.0  6.0 - 12.0 fL Final   • Platelets 08/26/2020 193  140 - 450 10*3/mm3 Final   • Neutrophil % 08/26/2020 50.3  42.7 - 76.0 % Final   • Lymphocyte % 08/26/2020 39.5  19.6 - 45.3 % Final   • Monocyte % 08/26/2020 6.7  5.0 - 12.0 % Final   • Eosinophil % 08/26/2020 2.9  0.3 - 6.2 % Final   • Basophil % 08/26/2020 0.4  0.0 - 1.5 % Final   • Immature Grans % 08/26/2020 0.2  0.0 - 0.5 % Final   • Neutrophils, Absolute 08/26/2020 2.46  1.70 - 7.00 10*3/mm3 Final   • Lymphocytes, Absolute 08/26/2020 1.93  0.70 - 3.10 10*3/mm3 Final   • Monocytes, Absolute 08/26/2020 0.33  0.10 - 0.90 10*3/mm3 Final   • Eosinophils, Absolute 08/26/2020 0.14  0.00 - 0.40 10*3/mm3 Final   • Basophils, Absolute 08/26/2020 0.02  0.00 - 0.20 10*3/mm3 Final   • Immature Grans, Absolute 08/26/2020 0.01  0.00 - 0.05 10*3/mm3 Final   • nRBC 08/26/2020 0.0  0.0 - 0.2 /100 WBC Final           Assessment/Plan   Diagnoses and all orders for this visit:    1. Sciatica of right side (Primary)  -     methylPREDNISolone acetate (DEPO-medrol) injection 80 mg

## 2020-11-09 ENCOUNTER — TELEPHONE (OUTPATIENT)
Dept: FAMILY MEDICINE CLINIC | Facility: CLINIC | Age: 50
End: 2020-11-09

## 2020-11-09 RX ORDER — PREDNISONE 10 MG/1
10 TABLET ORAL 2 TIMES DAILY
Qty: 10 TABLET | Refills: 0 | Status: SHIPPED | OUTPATIENT
Start: 2020-11-09 | End: 2021-06-04

## 2020-11-12 ENCOUNTER — APPOINTMENT (OUTPATIENT)
Dept: GENERAL RADIOLOGY | Facility: HOSPITAL | Age: 50
End: 2020-11-12

## 2020-11-12 ENCOUNTER — HOSPITAL ENCOUNTER (EMERGENCY)
Facility: HOSPITAL | Age: 50
Discharge: HOME | End: 2020-11-12
Admitting: EMERGENCY MEDICINE

## 2020-11-12 VITALS
TEMPERATURE: 98 F | DIASTOLIC BLOOD PRESSURE: 72 MMHG | OXYGEN SATURATION: 98 % | RESPIRATION RATE: 14 BRPM | HEIGHT: 67 IN | HEART RATE: 91 BPM | SYSTOLIC BLOOD PRESSURE: 126 MMHG | BODY MASS INDEX: 41.21 KG/M2 | WEIGHT: 262.57 LBS

## 2020-11-12 DIAGNOSIS — M25.562 ACUTE PAIN OF LEFT KNEE: Primary | ICD-10-CM

## 2020-11-12 LAB — SARS-COV-2 RNA PNL SPEC NAA+PROBE: NOT DETECTED

## 2020-11-12 PROCEDURE — C9803 HOPD COVID-19 SPEC COLLECT: HCPCS

## 2020-11-12 PROCEDURE — 73562 X-RAY EXAM OF KNEE 3: CPT

## 2020-11-12 PROCEDURE — 87635 SARS-COV-2 COVID-19 AMP PRB: CPT | Performed by: NURSE PRACTITIONER

## 2020-11-12 PROCEDURE — 99283 EMERGENCY DEPT VISIT LOW MDM: CPT

## 2020-11-12 RX ORDER — HYDROCODONE BITARTRATE AND ACETAMINOPHEN 5; 325 MG/1; MG/1
1 TABLET ORAL ONCE AS NEEDED
Status: DISCONTINUED | OUTPATIENT
Start: 2020-11-12 | End: 2020-11-12 | Stop reason: HOSPADM

## 2020-11-12 RX ORDER — NAPROXEN 500 MG/1
500 TABLET ORAL 2 TIMES DAILY PRN
Qty: 11 TABLET | Refills: 0 | Status: SHIPPED | OUTPATIENT
Start: 2020-11-12 | End: 2021-02-15 | Stop reason: SDUPTHER

## 2020-11-12 RX ADMIN — HYDROCODONE BITARTRATE AND ACETAMINOPHEN 1 TABLET: 5; 325 TABLET ORAL at 19:29

## 2020-11-12 NOTE — ED PROVIDER NOTES
Subjective   History: Patient is a 49-year-old female complains of left knee pain since 3 PM.  States she was walking and heard a pop attempted to place her left foot on the ground and was unable to bear weight on her left knee.  She plates she attempted [employer] and put on a knee brace that she had at home but was still unable to bear weight on the knee.  Denies any history of knee injury or surgery.  Reports she did have recent sciatica of right leg was placed on steroids and thought maybe she was favoring her left leg too much.  Denies any numbness tingling or weakness to left lower extremity.      Onset: 3 PM   location: Left knee  Duration: Constant  Character: Ache  Aggravating/Alleviating factors: Bearing weight makes it worse  Radiation nonradiating moderate  Severity:             Review of Systems   Constitutional: Negative for chills, fatigue and fever.   HENT: Negative for congestion, sore throat, tinnitus and trouble swallowing.    Eyes: Negative for photophobia, discharge and visual disturbance.   Respiratory: Negative for cough and shortness of breath.    Cardiovascular: Negative for chest pain.   Gastrointestinal: Negative for abdominal pain, diarrhea, nausea and vomiting.   Genitourinary: Negative for dysuria, frequency and urgency.   Musculoskeletal: Positive for arthralgias. Negative for back pain and myalgias.   Skin: Negative for rash.   Neurological: Negative for dizziness and headaches.   Psychiatric/Behavioral: Negative for confusion.       Past Medical History:   Diagnosis Date   • Hypertension        No Known Allergies    Past Surgical History:   Procedure Laterality Date   • TUBAL ABDOMINAL LIGATION         No family history on file.    Social History     Socioeconomic History   • Marital status: Single     Spouse name: Not on file   • Number of children: Not on file   • Years of education: Not on file   • Highest education level: Not on file   Tobacco Use   • Smoking status: Former Smoker     " Years: 15.00   • Smokeless tobacco: Never Used   • Tobacco comment: quit 13 yrs ago   Substance and Sexual Activity   • Alcohol use: Yes     Comment: caffeine 1c qd           Objective   Physical Exam  Constitutional:       General: She is not in acute distress.     Appearance: She is obese.   HENT:      Head: Normocephalic and atraumatic.      Right Ear: Tympanic membrane normal.      Left Ear: Tympanic membrane normal.      Mouth/Throat:      Mouth: Mucous membranes are moist.      Pharynx: Oropharynx is clear. No oropharyngeal exudate or posterior oropharyngeal erythema.   Eyes:      Pupils: Pupils are equal, round, and reactive to light.   Neck:      Musculoskeletal: Normal range of motion.   Cardiovascular:      Rate and Rhythm: Normal rate.      Heart sounds: Normal heart sounds. No murmur.   Pulmonary:      Effort: Pulmonary effort is normal.      Breath sounds: Normal breath sounds.   Musculoskeletal:         General: Swelling and tenderness present. No deformity.      Comments: Left knee with mild edema no erythema no warmth.  No fluctuation or induration.  No pain with flexion of knee mild discomfort with full extension as well as valgus and varus maneuvers.  Nontender on palpation.  2+ DP pulses with distal neurovascular sensation intact.  Dorsiflexion and extension appropriate strength.   Skin:     General: Skin is warm and dry.   Neurological:      Mental Status: She is alert and oriented to person, place, and time.   Psychiatric:         Mood and Affect: Mood normal.         Behavior: Behavior normal.         Thought Content: Thought content normal.         Judgment: Judgment normal.         Procedures           ED Course    /72   Pulse 91   Temp 98 °F (36.7 °C) (Oral)   Resp 14   Ht 170.2 cm (67\")   Wt 119 kg (262 lb 9.1 oz)   SpO2 98%   BMI 41.12 kg/m²   Labs Reviewed   COVID-19,ABBOTT IN-HOUSE,NP SWAB (NO TRANSPORT MEDIA) 2 HR TAT - Normal    Narrative:     Fact sheet for providers: " https://www.fda.gov/media/947556/download     Fact sheet for patients: https://www.fda.gov/media/606469/download     Medications - No data to display  No radiology results for the last day                                             MDM     Patient came to ER complaining of left knee pain after she was walking and heard a pop, no known injury.  Was given Norco and had left knee x-ray while in ER.  X-ray was negative for any fracture dislocation did show mild osteoarthritis.  Patient was placed in knee immobilizer given crutches with return demonstration and told to follow-up with Ortho on-call, Dr. Roblero name and number given to patient on discharge.  Patient also stated she was exposed to positive Covid patient and has had recent scratchiness to her throat and like to be tested.  Patient was Covid swab but did not wait on result, may quarantine for the next 14 days or until patient gets results.    Prescription: Naproxen    Final diagnoses:   Acute pain of left knee            Juanita Albarado, APRN  11/12/20 1916       Juanita Albarado, APRN  11/22/20 2155

## 2020-11-12 NOTE — ED NOTES
Pt had sciatica on the right side, PCP wrote her some prednisone, felt better. Today she was walking and heard a pop. Her knee has been hurting ever since.      Yelitza Carlin RN  11/12/20 1821

## 2020-11-13 ENCOUNTER — TELEPHONE (OUTPATIENT)
Dept: FAMILY MEDICINE CLINIC | Facility: CLINIC | Age: 50
End: 2020-11-13

## 2020-11-13 NOTE — TELEPHONE ENCOUNTER
PT WENT TO  ON [facility] LAST NIGHT AND THEY REFERRED DAYNA KENT.  SHE IS ASKING FOR SOMETHING FOR PAIN AS SHE CANNOT WALK.  [facility] [address].  SHE SAID [facility] GAVE HER A SCRIPT FOR NAPROXEN AND SHE WANTS SOMETHING STRONGER.

## 2020-11-13 NOTE — DISCHARGE INSTRUCTIONS
Take naproxen as needed for discomfort.  May use knee immobilizer and crutches while up ambulating.  Do not take ibuprofen Aleve or Motrin with naproxen as they are in the same family.  Call tomorrow for follow-up appointment with Ortho.

## 2020-11-13 NOTE — TELEPHONE ENCOUNTER
I am not going to prescribe anything stronger than what is already on her chart.  Follow up with Dr. Lopez as planned.

## 2020-11-17 RX ORDER — LISINOPRIL AND HYDROCHLOROTHIAZIDE 25; 20 MG/1; MG/1
TABLET ORAL
Qty: 90 TABLET | Refills: 0 | Status: SHIPPED | OUTPATIENT
Start: 2020-11-17 | End: 2021-02-15 | Stop reason: SDUPTHER

## 2021-02-15 ENCOUNTER — TELEMEDICINE (OUTPATIENT)
Dept: FAMILY MEDICINE CLINIC | Facility: CLINIC | Age: 51
End: 2021-02-15

## 2021-02-15 DIAGNOSIS — E78.5 HYPERLIPIDEMIA, UNSPECIFIED HYPERLIPIDEMIA TYPE: ICD-10-CM

## 2021-02-15 DIAGNOSIS — M25.562 CHRONIC PAIN OF BOTH KNEES: ICD-10-CM

## 2021-02-15 DIAGNOSIS — I10 ESSENTIAL HYPERTENSION: Primary | ICD-10-CM

## 2021-02-15 DIAGNOSIS — M25.561 CHRONIC PAIN OF BOTH KNEES: ICD-10-CM

## 2021-02-15 DIAGNOSIS — G89.29 CHRONIC PAIN OF BOTH KNEES: ICD-10-CM

## 2021-02-15 DIAGNOSIS — L30.9 ECZEMA, UNSPECIFIED TYPE: ICD-10-CM

## 2021-02-15 DIAGNOSIS — R68.89 COLD INTOLERANCE: ICD-10-CM

## 2021-02-15 DIAGNOSIS — R73.9 HYPERGLYCEMIA: ICD-10-CM

## 2021-02-15 PROCEDURE — 99214 OFFICE O/P EST MOD 30 MIN: CPT | Performed by: FAMILY MEDICINE

## 2021-02-15 RX ORDER — LISINOPRIL AND HYDROCHLOROTHIAZIDE 25; 20 MG/1; MG/1
1 TABLET ORAL DAILY
Qty: 90 TABLET | Refills: 1 | Status: SHIPPED | OUTPATIENT
Start: 2021-02-15 | End: 2021-08-17

## 2021-02-15 RX ORDER — OMEPRAZOLE 20 MG/1
20 CAPSULE, DELAYED RELEASE ORAL DAILY
Qty: 90 CAPSULE | Refills: 1 | Status: SHIPPED | OUTPATIENT
Start: 2021-02-15 | End: 2022-01-10 | Stop reason: SDUPTHER

## 2021-02-15 RX ORDER — TRIAMCINOLONE ACETONIDE 1 MG/G
CREAM TOPICAL 2 TIMES DAILY
Qty: 80 G | Refills: 1 | Status: SHIPPED | OUTPATIENT
Start: 2021-02-15 | End: 2021-12-06

## 2021-02-15 RX ORDER — LISINOPRIL AND HYDROCHLOROTHIAZIDE 25; 20 MG/1; MG/1
TABLET ORAL
Qty: 90 TABLET | Refills: 0 | OUTPATIENT
Start: 2021-02-15

## 2021-02-15 RX ORDER — OMEPRAZOLE 20 MG/1
CAPSULE, DELAYED RELEASE ORAL
Qty: 90 CAPSULE | Refills: 0 | OUTPATIENT
Start: 2021-02-15

## 2021-02-15 RX ORDER — NAPROXEN 500 MG/1
500 TABLET ORAL 2 TIMES DAILY PRN
Qty: 180 TABLET | Refills: 1 | Status: SHIPPED | OUTPATIENT
Start: 2021-02-15 | End: 2021-08-23

## 2021-02-15 RX ORDER — ATORVASTATIN CALCIUM 20 MG/1
20 TABLET, FILM COATED ORAL NIGHTLY
Qty: 90 TABLET | Refills: 1 | Status: SHIPPED | OUTPATIENT
Start: 2021-02-15 | End: 2021-10-26

## 2021-02-15 RX ORDER — NAPROXEN 500 MG/1
500 TABLET ORAL 2 TIMES DAILY PRN
Qty: 180 TABLET | Refills: 1 | Status: SHIPPED | OUTPATIENT
Start: 2021-02-15 | End: 2021-02-15 | Stop reason: SDUPTHER

## 2021-02-15 NOTE — PROGRESS NOTES
Chief Complaint  Hypertension, Hyperlipidemia, Knee Pain, and Rash  You have chosen to receive care through a telehealth visit.  Do you consent to use a video/audio connection for your medical care today? Yes    Tyesha Appiah presents to [facility]  She says she has been measuring her blood pressure at home and that it has been good.    Hypertension  This is a chronic problem. The current episode started more than 1 year ago. The problem is unchanged. Associated symptoms include headaches. Pertinent negatives include no anxiety, blurred vision, chest pain, palpitations, peripheral edema or shortness of breath. There are no associated agents to hypertension. Current antihypertension treatment includes diuretics and ACE inhibitors. The current treatment provides moderate improvement. There are no compliance problems.  There is no history of chronic renal disease.   Hyperlipidemia  This is a chronic problem. The current episode started more than 1 year ago. She has no history of chronic renal disease, diabetes or hypothyroidism. Pertinent negatives include no chest pain or shortness of breath. Current antihyperlipidemic treatment includes statins. The current treatment provides significant improvement of lipids. There are no compliance problems.    Knee Pain   The incident occurred more than 1 week ago. There was no injury mechanism. The pain is present in the left knee and right knee. The quality of the pain is described as aching. The pain is moderate. The pain has been constant since onset. She reports no foreign bodies present. The symptoms are aggravated by weight bearing and movement. She has tried rest and NSAIDs for the symptoms. The treatment provided mild relief.   Rash  This is a new problem. The current episode started 1 to 4 weeks ago. The problem is unchanged. The rash is diffuse. The rash is characterized by dryness and itchiness. She was exposed to  nothing. Pertinent negatives include no shortness of breath. Past treatments include moisturizer. The treatment provided mild relief.       Review of Systems   Eyes: Negative for blurred vision.   Respiratory: Negative for shortness of breath.    Cardiovascular: Negative for chest pain and palpitations.   Skin: Positive for rash.     Objective   Vital Signs:   There were no vitals taken for this visit.    Physical Exam  Constitutional:       Appearance: Normal appearance.   Pulmonary:      Effort: Pulmonary effort is normal.   Skin:     Findings: Rash present.   Neurological:      General: No focal deficit present.      Mental Status: She is alert.   Psychiatric:         Mood and Affect: Mood normal.        Result Review :                 Assessment and Plan    Diagnoses and all orders for this visit:    1. Essential hypertension (Primary)  -     lisinopril-hydrochlorothiazide (PRINZIDE,ZESTORETIC) 20-25 MG per tablet; Take 1 tablet by mouth Daily.  Dispense: 90 tablet; Refill: 1  -     TSH  -     Comprehensive Metabolic Panel  -     Hemoglobin A1c  -     Lipid Panel    2. Cold intolerance  -     TSH  -     Comprehensive Metabolic Panel  -     Hemoglobin A1c  -     Lipid Panel    3. Hyperlipidemia, unspecified hyperlipidemia type  -     atorvastatin (Lipitor) 20 MG tablet; Take 1 tablet by mouth Every Night.  Dispense: 90 tablet; Refill: 1  -     Lipid Panel    4. Hyperglycemia  -     Comprehensive Metabolic Panel  -     Hemoglobin A1c    5. Eczema, unspecified type  -     triamcinolone (KENALOG) 0.1 % cream; Apply  topically to the appropriate area as directed 2 (Two) Times a Day.  Dispense: 80 g; Refill: 1    6. Chronic pain of both knees  -     naproxen (EC NAPROSYN) 500 MG EC tablet; Take 1 tablet by mouth 2 (Two) Times a Day As Needed for Mild Pain .  Dispense: 180 tablet; Refill: 1  -     Diclofenac Sodium (Voltaren) 1 % gel gel; Apply 4 g topically to the appropriate area as directed 4 (Four) Times a Day As  Needed (pain).  Dispense: 500 g; Refill: 1    Other orders  -     Discontinue: naproxen (EC NAPROSYN) 500 MG EC tablet; Take 1 tablet by mouth 2 (Two) Times a Day As Needed for Mild Pain .  Dispense: 180 tablet; Refill: 1  -     omeprazole (priLOSEC) 20 MG capsule; Take 1 capsule by mouth Daily.  Dispense: 90 capsule; Refill: 1        Follow Up   No follow-ups on file.  Patient was given instructions and counseling regarding her condition or for health maintenance advice. Please see specific information pulled into the AVS if appropriate.

## 2021-03-11 ENCOUNTER — TELEPHONE (OUTPATIENT)
Dept: FAMILY MEDICINE CLINIC | Facility: CLINIC | Age: 51
End: 2021-03-11

## 2021-03-11 DIAGNOSIS — M25.562 CHRONIC PAIN OF BOTH KNEES: ICD-10-CM

## 2021-03-11 DIAGNOSIS — G89.29 CHRONIC PAIN OF BOTH KNEES: ICD-10-CM

## 2021-03-11 DIAGNOSIS — M25.561 CHRONIC PAIN OF BOTH KNEES: ICD-10-CM

## 2021-03-11 NOTE — TELEPHONE ENCOUNTER
Caller: Aisha Appiah    Relationship: Self    Best call back number:  [phone]    Medication needed:   Requested Prescriptions     Pending Prescriptions Disp Refills   • Diclofenac Sodium (Voltaren) 1 % gel gel 500 g 1     Sig: Apply 4 g topically to the appropriate area as directed 4 (Four) Times a Day As Needed (pain).         PATIENT ALSO WANTED SOMETHING ELSE FOR PAIN IN BACK KNEES    PATIENT SAID THAT THE NAPROXEN IS NOT WORKING ANY LONGER AND WANTED AN ALTERNATIVE.     PATIENT ALSO STATED THAT THERE IS ALSO PAIN SHOOTING DOWN TO FEET, MAKES HER FEET TINGLE.       When do you need the refill by: ASAP     What details did the patient provide when requesting the medication: SOON     Does the patient have less than a 3 day supply:  [x] Yes  [] No    What is the patient's preferred pharmacy: [facility] [address] [place] [address], [place] - [address] [place] [address] - [phone] [facility] [phone] FX

## 2021-03-14 RX ORDER — BACLOFEN 10 MG/1
TABLET ORAL
Qty: 30 TABLET | Refills: 0 | OUTPATIENT
Start: 2021-03-14

## 2021-03-15 NOTE — TELEPHONE ENCOUNTER
I think a steroid injection in her knees is a good idea but this is not something that I do any more.  I recommend that she see ortho for this.

## 2021-03-15 NOTE — TELEPHONE ENCOUNTER
Spoke with the pt and she wants to know if she can get cortisone injections in both knees. You did one a few mos ago.

## 2021-06-04 ENCOUNTER — OFFICE VISIT (OUTPATIENT)
Dept: FAMILY MEDICINE CLINIC | Facility: CLINIC | Age: 51
End: 2021-06-04

## 2021-06-04 ENCOUNTER — LAB (OUTPATIENT)
Dept: FAMILY MEDICINE CLINIC | Facility: CLINIC | Age: 51
End: 2021-06-04

## 2021-06-04 VITALS
WEIGHT: 259 LBS | HEART RATE: 79 BPM | HEIGHT: 66 IN | TEMPERATURE: 97.5 F | OXYGEN SATURATION: 98 % | SYSTOLIC BLOOD PRESSURE: 90 MMHG | DIASTOLIC BLOOD PRESSURE: 56 MMHG | BODY MASS INDEX: 41.62 KG/M2

## 2021-06-04 DIAGNOSIS — G89.29 CHRONIC BILATERAL LOW BACK PAIN WITHOUT SCIATICA: ICD-10-CM

## 2021-06-04 DIAGNOSIS — M79.671 PAIN OF RIGHT HEEL: Primary | ICD-10-CM

## 2021-06-04 DIAGNOSIS — M54.50 CHRONIC BILATERAL LOW BACK PAIN WITHOUT SCIATICA: ICD-10-CM

## 2021-06-04 LAB
ALBUMIN SERPL-MCNC: 4 G/DL (ref 3.5–5.2)
ALBUMIN/GLOB SERPL: 1.5 G/DL
ALP SERPL-CCNC: 60 U/L (ref 39–117)
ALT SERPL W P-5'-P-CCNC: 18 U/L (ref 1–33)
ANION GAP SERPL CALCULATED.3IONS-SCNC: 8.4 MMOL/L (ref 5–15)
AST SERPL-CCNC: 22 U/L (ref 1–32)
BILIRUB SERPL-MCNC: 0.3 MG/DL (ref 0–1.2)
BUN SERPL-MCNC: 13 MG/DL (ref 6–20)
BUN/CREAT SERPL: 14.9 (ref 7–25)
CALCIUM SPEC-SCNC: 8.8 MG/DL (ref 8.6–10.5)
CHLORIDE SERPL-SCNC: 101 MMOL/L (ref 98–107)
CHOLEST SERPL-MCNC: 180 MG/DL (ref 0–200)
CO2 SERPL-SCNC: 28.6 MMOL/L (ref 22–29)
CREAT SERPL-MCNC: 0.87 MG/DL (ref 0.57–1)
GFR SERPL CREATININE-BSD FRML MDRD: 84 ML/MIN/1.73
GLOBULIN UR ELPH-MCNC: 2.7 GM/DL
GLUCOSE SERPL-MCNC: 105 MG/DL (ref 65–99)
HDLC SERPL-MCNC: 57 MG/DL (ref 40–60)
LDLC SERPL CALC-MCNC: 106 MG/DL (ref 0–100)
LDLC/HDLC SERPL: 1.83 {RATIO}
POTASSIUM SERPL-SCNC: 3.7 MMOL/L (ref 3.5–5.2)
PROT SERPL-MCNC: 6.7 G/DL (ref 6–8.5)
SODIUM SERPL-SCNC: 138 MMOL/L (ref 136–145)
TRIGL SERPL-MCNC: 94 MG/DL (ref 0–150)
TSH SERPL DL<=0.05 MIU/L-ACNC: 3.07 UIU/ML (ref 0.27–4.2)
VLDLC SERPL-MCNC: 17 MG/DL (ref 5–40)

## 2021-06-04 PROCEDURE — 99213 OFFICE O/P EST LOW 20 MIN: CPT | Performed by: FAMILY MEDICINE

## 2021-06-04 PROCEDURE — 83036 HEMOGLOBIN GLYCOSYLATED A1C: CPT | Performed by: FAMILY MEDICINE

## 2021-06-04 PROCEDURE — 80061 LIPID PANEL: CPT | Performed by: FAMILY MEDICINE

## 2021-06-04 PROCEDURE — 84443 ASSAY THYROID STIM HORMONE: CPT | Performed by: FAMILY MEDICINE

## 2021-06-04 PROCEDURE — 80053 COMPREHEN METABOLIC PANEL: CPT | Performed by: FAMILY MEDICINE

## 2021-06-04 PROCEDURE — 36415 COLL VENOUS BLD VENIPUNCTURE: CPT | Performed by: FAMILY MEDICINE

## 2021-06-04 RX ORDER — CETIRIZINE HYDROCHLORIDE 10 MG/1
10 TABLET ORAL DAILY
Qty: 90 TABLET | Refills: 1 | Status: SHIPPED | OUTPATIENT
Start: 2021-06-04 | End: 2021-09-13 | Stop reason: SDUPTHER

## 2021-06-04 RX ORDER — FLUTICASONE PROPIONATE 50 MCG
2 SPRAY, SUSPENSION (ML) NASAL DAILY
Qty: 18.2 ML | Refills: 1 | Status: SHIPPED | OUTPATIENT
Start: 2021-06-04 | End: 2021-09-13 | Stop reason: SDUPTHER

## 2021-06-04 RX ORDER — MELOXICAM 15 MG/1
15 TABLET ORAL DAILY
Qty: 90 TABLET | Refills: 0 | Status: SHIPPED | OUTPATIENT
Start: 2021-06-04 | End: 2021-08-23

## 2021-06-04 NOTE — PROGRESS NOTES
"Chief Complaint  Foot Problem (BURNING SENSATION ON RIGHT HEEL), BACK ISSUE (ITCHING ON BACK AND SHOULDERS/ WANTING A BACK BRACE), Allergies, and Back Pain    Tyesha Appiah presents to [facility]  Intense right heel pain and itching of both feet.  Sometimes the pain wakes her up from sleep.     Foot Problem  This is a new problem. The current episode started more than 1 month ago. The problem occurs constantly. The problem has been gradually worsening. Pertinent negatives include no chills, congestion, coughing, fever, rash or swollen glands. She has tried NSAIDs and rest for the symptoms. The treatment provided mild relief.   Allergies  Pertinent negatives include no chills, congestion, coughing, fever, rash or swollen glands.   Back Pain  Pertinent negatives include no fever.       Objective   Vital Signs:   BP 90/56   Pulse 79   Temp 97.5 °F (36.4 °C) (Temporal)   Ht 167.6 cm (66\")   Wt 117 kg (259 lb)   SpO2 98%   BMI 41.80 kg/m²     Physical Exam  Constitutional:       General: She is not in acute distress.     Appearance: She is well-developed.   HENT:      Head: Normocephalic.   Eyes:      General: Lids are normal.      Conjunctiva/sclera: Conjunctivae normal.   Neck:      Thyroid: No thyroid mass or thyromegaly.      Trachea: Trachea normal.   Cardiovascular:      Rate and Rhythm: Normal rate and regular rhythm.      Heart sounds: Normal heart sounds.   Pulmonary:      Effort: Pulmonary effort is normal.      Breath sounds: Normal breath sounds.   Abdominal:      Palpations: Abdomen is soft.   Musculoskeletal:      Cervical back: Normal range of motion.      Lumbar back: Tenderness present. Decreased range of motion.      Right foot: Tenderness present.   Lymphadenopathy:      Cervical: No cervical adenopathy.   Skin:     General: Skin is warm and dry.   Neurological:      Mental Status: She is alert and oriented to person, place, and time. "   Psychiatric:         Attention and Perception: She is attentive.         Mood and Affect: Mood normal.         Speech: Speech normal.         Behavior: Behavior normal.        Result Review :   The following data was reviewed by: Ariane Beaulieu MD on 06/04/2021:  Common labs    Common Labsle 8/26/20 8/26/20 8/26/20 6/4/21 6/4/21 6/4/21    1126 1126 1126 1021 1021 1021   Glucose  113 (A)  105 (A)     BUN  7  13     Creatinine  0.86  0.87     eGFR African Am  86  84     Sodium  138  138     Potassium  4.1  3.7     Chloride  101  101     Calcium  9.5  8.8     Albumin  4.20  4.00     Total Bilirubin  0.4  0.3     Alkaline Phosphatase  56  60     AST (SGOT)  30  22     ALT (SGPT)  28  18     WBC 4.89        Hemoglobin 13.0        Hematocrit 39.1        Platelets 193        Total Cholesterol   201 (A)  180    Triglycerides   141  94    HDL Cholesterol   56  57    LDL Cholesterol    117 (A)  106 (A)    Hemoglobin A1C      6.0 (A)   (A) Abnormal value                      Assessment and Plan    Diagnoses and all orders for this visit:    1. Pain of right heel (Primary)  -     Ambulatory Referral to Podiatry    2. Chronic bilateral low back pain without sciatica  -     Back Brace    Other orders  -     fluticasone (Flonase) 50 MCG/ACT nasal spray; 2 sprays into the nostril(s) as directed by provider Daily.  Dispense: 18.2 mL; Refill: 1  -     cetirizine (zyrTEC) 10 MG tablet; Take 1 tablet by mouth Daily.  Dispense: 90 tablet; Refill: 1  -     meloxicam (Mobic) 15 MG tablet; Take 1 tablet by mouth Daily.  Dispense: 90 tablet; Refill: 0        Follow Up   No follow-ups on file.  Patient was given instructions and counseling regarding her condition or for health maintenance advice. Please see specific information pulled into the AVS if appropriate.

## 2021-06-06 LAB — HBA1C MFR BLD: 6 % (ref 3.5–5.6)

## 2021-06-20 PROBLEM — M79.671 PAIN OF RIGHT HEEL: Status: ACTIVE | Noted: 2021-06-20

## 2021-06-28 ENCOUNTER — OFFICE VISIT (OUTPATIENT)
Dept: PODIATRY | Facility: CLINIC | Age: 51
End: 2021-06-28

## 2021-06-28 VITALS
SYSTOLIC BLOOD PRESSURE: 149 MMHG | HEART RATE: 69 BPM | HEIGHT: 66 IN | BODY MASS INDEX: 41.46 KG/M2 | DIASTOLIC BLOOD PRESSURE: 96 MMHG | WEIGHT: 258 LBS

## 2021-06-28 DIAGNOSIS — M72.2 PLANTAR FASCIITIS, RIGHT: ICD-10-CM

## 2021-06-28 DIAGNOSIS — M79.671 RIGHT FOOT PAIN: Primary | ICD-10-CM

## 2021-06-28 PROCEDURE — 99203 OFFICE O/P NEW LOW 30 MIN: CPT | Performed by: PODIATRIST

## 2021-06-28 NOTE — PROGRESS NOTES
06/28/2021  Foot and Ankle Surgery - New Patient   Provider: Dr. Vishal Ojeda DPM  Location: [facility]    Subjective:  Aisha Appiah is a 50 y.o. female.     Chief Complaint   Patient presents with   • Right Foot - Pain       HPI: Patient is a 50-year-old female that presents with pain involving the right heel.  She states that these issues have been intermittently noticed over the last 2 months.  She rates the pain an 8 at its worst but does not have any substantial discomfort today.  She is unaware of any injury.  She notes discomfort with first few steps in the morning after long periods of activity.  She denies any previous issues involving her lower extremities.  He is concerned that the symptoms will progress    No Known Allergies    Past Medical History:   Diagnosis Date   • Hypertension        Past Surgical History:   Procedure Laterality Date   • TUBAL ABDOMINAL LIGATION         Family History   Problem Relation Age of Onset   • No Known Problems Mother    • No Known Problems Father        Social History     Socioeconomic History   • Marital status: Single     Spouse name: Not on file   • Number of children: Not on file   • Years of education: Not on file   • Highest education level: Not on file   Tobacco Use   • Smoking status: Former Smoker     Years: 15.00   • Smokeless tobacco: Never Used   • Tobacco comment: quit 13 yrs ago   Vaping Use   • Vaping Use: Never used   Substance and Sexual Activity   • Alcohol use: Yes     Comment: caffeine 1c qd   • Drug use: Defer   • Sexual activity: Defer        Current Outpatient Medications on File Prior to Visit   Medication Sig Dispense Refill   • albuterol sulfate  (90 Base) MCG/ACT inhaler Inhale 2 puffs Every 6 (Six) Hours As Needed for Wheezing. 1 inhaler 0   • atorvastatin (Lipitor) 20 MG tablet Take 1 tablet by mouth Every Night. 90 tablet 1   • cetirizine (zyrTEC) 10 MG tablet Take 1 tablet by mouth Daily. 90 tablet 1   • Diclofenac  "Sodium (Voltaren) 1 % gel gel Apply 4 g topically to the appropriate area as directed 4 (Four) Times a Day As Needed (pain). 500 g 1   • fluticasone (Flonase) 50 MCG/ACT nasal spray 2 sprays into the nostril(s) as directed by provider Daily. 18.2 mL 1   • lisinopril-hydrochlorothiazide (PRINZIDE,ZESTORETIC) 20-25 MG per tablet Take 1 tablet by mouth Daily. 90 tablet 1   • meloxicam (Mobic) 15 MG tablet Take 1 tablet by mouth Daily. 90 tablet 0   • naproxen (EC NAPROSYN) 500 MG EC tablet Take 1 tablet by mouth 2 (Two) Times a Day As Needed for Mild Pain . 180 tablet 1   • omeprazole (priLOSEC) 20 MG capsule Take 1 capsule by mouth Daily. 90 capsule 1   • triamcinolone (KENALOG) 0.1 % cream Apply  topically to the appropriate area as directed 2 (Two) Times a Day. 80 g 1   • [DISCONTINUED] cyclobenzaprine (FLEXERIL) 10 MG tablet Take 1 tablet by mouth 3 (Three) Times a Day As Needed for Muscle Spasms. 30 tablet 1   • [DISCONTINUED] meclizine (ANTIVERT) 25 MG tablet Take 1 tablet by mouth Every 6 (Six) Hours As Needed for Dizziness. 28 tablet 0     No current facility-administered medications on file prior to visit.       Review of Systems:  General: Denies fever, chills, fatigue, and weakness.  Eyes: Denies vision loss, blurry vision, and excessive redness.  ENT: Denies hearing issues and difficulty swallowing.  Cardiovascular: Denies palpitations, chest pain, or syncopal episodes.  Respiratory: Denies shortness of breath, wheezing, and coughing.  GI: Denies abdominal pain, nausea, and vomiting.   : Denies frequency, hematuria, and urgency.  Musculoskeletal: + Right foot pain  Derm: Denies rash, open wounds, or suspicious lesions.  Neuro: Denies headaches, numbness, loss of coordination, and tremors.  Psych: Denies anxiety and depression.  Endocrine: Denies temperature intolerance and changes in appetite.  Heme: Denies bleeding disorders or abnormal bruising.     Objective   /96   Pulse 69   Ht 167.6 cm (66\") "   Wt 117 kg (258 lb)   BMI 41.64 kg/m²     Foot/Ankle Exam:       General:   Appearance: appears stated age and healthy    Orientation: AAOx3    Affect: appropriate    Gait: antalgic      VASCULAR      Right Foot Vascularity   Normal vascular exam    Dorsalis pedis:  2+  Posterior tibial:  2+  Skin Temperature: warm    Edema Gradin+  CFT:  < 3 seconds  Pedal Hair Growth:  Present  Varicosities: mild varicosities        NEUROLOGIC     Right Foot Neurologic   Light touch sensation:  Normal  Hot/Cold sensation: normal    Achilles reflex:  2+     MUSCULOSKELETAL      Right Foot Musculoskeletal   Ecchymosis:  None  Tenderness: plantar fascia and plantar heel    Arch:  Normal     MUSCLE STRENGTH     Right Foot Muscle Strength   Normal strength    Foot dorsiflexion:  5  Foot plantar flexion:  5  Foot inversion:  5  Foot eversion:  5     DERMATOLOGIC     Right Foot Dermatologic   Skin: skin intact       TESTS     Right Foot Tests   Anterior drawer: negative    Varus tilt: negative        Right Foot Additional Comments No gross deformity or instability.  Moderate equinus with knee extended and flexed.      Assessment/Plan   Diagnoses and all orders for this visit:    1. Right foot pain (Primary)    2. Plantar fasciitis, right      Patient presents with discomfort involving the plantar medial aspect of the right heel consistent with plantar fasciitis.  I did discuss the diagnosis and treatment options with the patient in office.  She does not have any substantial inflammation today.  I have recommended that she obtain a pair of over-the-counter arch supports.  We did discuss proper use and effects.  We also reviewed appropriate shoes and to avoid barefoot and unsupported weightbearing.  I would like her to start stretching manual therapy exercises.  We did also discuss rice therapy and proper use of OTC anti-inflammatories.  I would like to see her in 4 weeks for reevaluation.    No orders of the defined types were  placed in this encounter.       Note is dictated utilizing voice recognition software. Unfortunately this leads to occasional typographical errors. I apologize in advance if the situation occurs. If questions occur please do not hesitate to call our office.

## 2021-08-17 ENCOUNTER — TELEPHONE (OUTPATIENT)
Dept: FAMILY MEDICINE CLINIC | Facility: CLINIC | Age: 51
End: 2021-08-17

## 2021-08-17 DIAGNOSIS — I10 ESSENTIAL HYPERTENSION: ICD-10-CM

## 2021-08-17 RX ORDER — LISINOPRIL AND HYDROCHLOROTHIAZIDE 25; 20 MG/1; MG/1
TABLET ORAL
Qty: 90 TABLET | Refills: 1 | Status: SHIPPED | OUTPATIENT
Start: 2021-08-17 | End: 2021-08-17

## 2021-08-17 RX ORDER — LISINOPRIL AND HYDROCHLOROTHIAZIDE 25; 20 MG/1; MG/1
1 TABLET ORAL DAILY
Qty: 90 TABLET | Refills: 1 | Status: SHIPPED | OUTPATIENT
Start: 2021-08-17 | End: 2021-08-23 | Stop reason: SDUPTHER

## 2021-08-17 NOTE — TELEPHONE ENCOUNTER
Caller: Bijal Aisha BILL    Relationship: Self    Best call back number: [phone]     Medication needed:   Requested Prescriptions     Pending Prescriptions Disp Refills   • lisinopril-hydrochlorothiazide (PRINZIDE,ZESTORETIC) 20-25 MG per tablet 90 tablet 1     Sig: Take 1 tablet by mouth Daily.       When do you need the refill by: ASAP    What additional details did the patient provide when requesting the medication: PATIENT HAS ABOUT A WEEK LEFT ON MEDICATION  NEXT OV 08/23/21    Does the patient have less than a 3 day supply:  [] Yes  [x] No    What is the patient's preferred pharmacy: [facility] [place], [place] - [address] [place] [address] - [phone] [facility] [phone] FX

## 2021-08-17 NOTE — TELEPHONE ENCOUNTER
Caller: Aisha Appiah    Relationship: Self    Best call back number: [phone]    What is the best time to reach you: ANY TIME    Who are you requesting to speak with (clinical staff, provider,  specific staff member): CLINICAL STAFF     What was the call regarding: PATIENT IS NEEDING A MOLE REMOVED FROM THE SIDE OF HER LEFT BREAST     Do you require a callback: YES PLEASE

## 2021-08-23 ENCOUNTER — OFFICE VISIT (OUTPATIENT)
Dept: FAMILY MEDICINE CLINIC | Facility: CLINIC | Age: 51
End: 2021-08-23

## 2021-08-23 VITALS
TEMPERATURE: 97.1 F | WEIGHT: 259 LBS | OXYGEN SATURATION: 96 % | HEART RATE: 77 BPM | DIASTOLIC BLOOD PRESSURE: 97 MMHG | BODY MASS INDEX: 41.8 KG/M2 | SYSTOLIC BLOOD PRESSURE: 157 MMHG

## 2021-08-23 DIAGNOSIS — L91.8 SKIN TAG: ICD-10-CM

## 2021-08-23 DIAGNOSIS — I10 ESSENTIAL HYPERTENSION: ICD-10-CM

## 2021-08-23 DIAGNOSIS — M25.511 ACUTE PAIN OF RIGHT SHOULDER: Primary | ICD-10-CM

## 2021-08-23 PROCEDURE — 99214 OFFICE O/P EST MOD 30 MIN: CPT | Performed by: FAMILY MEDICINE

## 2021-08-23 RX ORDER — LISINOPRIL AND HYDROCHLOROTHIAZIDE 25; 20 MG/1; MG/1
1 TABLET ORAL DAILY
Qty: 90 TABLET | Refills: 1 | Status: SHIPPED | OUTPATIENT
Start: 2021-08-23 | End: 2022-01-10

## 2021-09-05 ENCOUNTER — HOSPITAL ENCOUNTER (EMERGENCY)
Facility: HOSPITAL | Age: 51
Discharge: HOME | End: 2021-09-06
Attending: EMERGENCY MEDICINE | Admitting: EMERGENCY MEDICINE

## 2021-09-05 DIAGNOSIS — I10 ESSENTIAL HYPERTENSION: Primary | ICD-10-CM

## 2021-09-05 PROCEDURE — 99281 EMR DPT VST MAYX REQ PHY/QHP: CPT

## 2021-09-06 VITALS
BODY MASS INDEX: 40.65 KG/M2 | OXYGEN SATURATION: 99 % | HEART RATE: 66 BPM | SYSTOLIC BLOOD PRESSURE: 136 MMHG | TEMPERATURE: 97 F | DIASTOLIC BLOOD PRESSURE: 82 MMHG | WEIGHT: 259 LBS | RESPIRATION RATE: 18 BRPM | HEIGHT: 67 IN

## 2021-09-06 NOTE — ED PROVIDER NOTES
Subjective   History of Present Illness  42-year-old female presents with elevated blood pressure.  She reports that she had had blood pressures 150s over 100 at home.  She had some readings that would be higher.  She has seen her PMD last week.  She reports her blood pressure has been elevated but had not taken her blood pressure medicine until just before the appointment so they did not make any adjustments.  She is having no chest pain.  No headache.  She describes occasional floaters in her vision.  She has no localized numbness weakness paresthesia of extremities.  She has been stressing about her blood pressure as friend recently  from a stroke.  Review of Systems  Negative for chest pain shortness of breath numbness weakness of extremities headache.  She reports she sometimes feels lightheaded.  Past Medical History:   Diagnosis Date   • Hypertension        No Known Allergies    Past Surgical History:   Procedure Laterality Date   • TUBAL ABDOMINAL LIGATION         Family History   Problem Relation Age of Onset   • No Known Problems Mother    • No Known Problems Father        Social History     Socioeconomic History   • Marital status: Single     Spouse name: Not on file   • Number of children: Not on file   • Years of education: Not on file   • Highest education level: Not on file   Tobacco Use   • Smoking status: Former Smoker     Years: 15.00   • Smokeless tobacco: Never Used   • Tobacco comment: quit 13 yrs ago   Vaping Use   • Vaping Use: Never used   Substance and Sexual Activity   • Alcohol use: Yes     Comment: caffeine 1c qd   • Drug use: Defer   • Sexual activity: Defer     Prior to Admission medications    Medication Sig Start Date End Date Taking? Authorizing Provider   albuterol sulfate  (90 Base) MCG/ACT inhaler Inhale 2 puffs Every 6 (Six) Hours As Needed for Wheezing. 20   Adri Raya MD   atorvastatin (Lipitor) 20 MG tablet Take 1 tablet by mouth Every Night. 2/15/21    Ariane Beaulieu MD   cetirizine (zyrTEC) 10 MG tablet Take 1 tablet by mouth Daily. 6/4/21   Ariane Beaulieu MD   diclofenac (VOLTAREN) 50 MG EC tablet Take 1 tablet by mouth 2 (Two) Times a Day As Needed (joint pain). 8/23/21   Ariane Beaulieu MD   Diclofenac Sodium (Voltaren) 1 % gel gel Apply 4 g topically to the appropriate area as directed 4 (Four) Times a Day As Needed (pain). 3/15/21   Ariane Beaulieu MD   fluticasone (Flonase) 50 MCG/ACT nasal spray 2 sprays into the nostril(s) as directed by provider Daily. 6/4/21   Ariane Beaulieu MD   lisinopril-hydrochlorothiazide (PRINZIDE,ZESTORETIC) 20-25 MG per tablet Take 1 tablet by mouth Daily. 8/23/21   Ariane Beaulieu MD   omeprazole (priLOSEC) 20 MG capsule Take 1 capsule by mouth Daily. 2/15/21   Ariane Beaulieu MD   triamcinolone (KENALOG) 0.1 % cream Apply  topically to the appropriate area as directed 2 (Two) Times a Day. 2/15/21   Ariane Beaulieu MD           Objective   Physical Exam  50-year-old female awake alert.  Generally overweight.  Pupils equal round at light.  Neck supple.  Chest clear equal breath sounds.  Cardiovascular regular rhythm.  Abdomen soft nontender.  Neurologic exam no focal findings are noted motor sensory grossly intact to extremities.  Extremities trace symmetric edema    sProcedures           ED Course                                           MDM  Patient was observed.  Findings were discussed with her.  Hypertension was discussed with her after discussion her blood pressure was 136/82 With similar readings in both arms.  She was discharged.  Advised to keep a blood pressure diary for the next few days.  To follow-up with primary provider return new or worsening symptoms  Final diagnoses:   Essential hypertension       ED Disposition  ED Disposition     ED Disposition Condition Comment    Discharge Stable           Ariane Beaulieu MD  [address] [facility]  [address]  [place]  [address]  [phone]    Schedule an appointment as soon as possible for a visit in 1 week           Medication List      No changes were made to your prescriptions during this visit.          Murtaza Smith MD  09/06/21 0055

## 2021-09-06 NOTE — ED NOTES
Patient was not in the room when I went in to discharge patient. Pt left w/o signing DC papers      Awa Mckeon RN  09/06/21 0102

## 2021-09-06 NOTE — DISCHARGE INSTRUCTIONS
Keep a blood pressure diary for the next week taking blood pressure morning and evening, follow-up with Dr. Beaulieu, return new or worsening symptoms

## 2021-09-13 ENCOUNTER — OFFICE VISIT (OUTPATIENT)
Dept: FAMILY MEDICINE CLINIC | Facility: CLINIC | Age: 51
End: 2021-09-13

## 2021-09-13 VITALS
WEIGHT: 253 LBS | TEMPERATURE: 97.3 F | OXYGEN SATURATION: 95 % | DIASTOLIC BLOOD PRESSURE: 93 MMHG | BODY MASS INDEX: 39.63 KG/M2 | SYSTOLIC BLOOD PRESSURE: 153 MMHG | HEART RATE: 87 BPM

## 2021-09-13 DIAGNOSIS — F51.01 PRIMARY INSOMNIA: ICD-10-CM

## 2021-09-13 DIAGNOSIS — Z00.00 WELLNESS EXAMINATION: Primary | ICD-10-CM

## 2021-09-13 DIAGNOSIS — Z12.11 SCREEN FOR COLON CANCER: ICD-10-CM

## 2021-09-13 DIAGNOSIS — Z12.31 ENCOUNTER FOR SCREENING MAMMOGRAM FOR BREAST CANCER: ICD-10-CM

## 2021-09-13 PROCEDURE — 99396 PREV VISIT EST AGE 40-64: CPT | Performed by: FAMILY MEDICINE

## 2021-09-13 RX ORDER — FLUTICASONE PROPIONATE 50 MCG
2 SPRAY, SUSPENSION (ML) NASAL DAILY
Qty: 18.2 ML | Refills: 1 | Status: SHIPPED | OUTPATIENT
Start: 2021-09-13 | End: 2022-03-18 | Stop reason: SDUPTHER

## 2021-09-13 RX ORDER — CETIRIZINE HYDROCHLORIDE 10 MG/1
10 TABLET ORAL DAILY
Qty: 90 TABLET | Refills: 1 | Status: SHIPPED | OUTPATIENT
Start: 2021-09-13 | End: 2022-07-20 | Stop reason: SDUPTHER

## 2021-09-13 RX ORDER — TRAZODONE HYDROCHLORIDE 50 MG/1
50 TABLET ORAL NIGHTLY
Qty: 90 TABLET | Refills: 0 | Status: SHIPPED | OUTPATIENT
Start: 2021-09-13 | End: 2022-03-16

## 2021-09-13 NOTE — PROGRESS NOTES
Tyesha Appiah is a 50 y.o. female and is here for a comprehensive physical exam. The patient reports no problems.    Do you take any herbs or supplements that were not prescribed by a doctor? no  Are you taking calcium supplements? no  Are you taking aspirin daily? no    The following portions of the patient's history were reviewed and updated as appropriate: allergies, current medications, past family history, past medical history, past social history, past surgical history and problem list.    Review of Systems  Do you have pain that bothers you in your daily life? not asked  A comprehensive review of systems was negative.    Objective   /93 (BP Location: Left arm, Patient Position: Sitting, Cuff Size: Adult)   Pulse 87   Temp 97.3 °F (36.3 °C) (Infrared)   Wt 115 kg (253 lb)   SpO2 95%   BMI 39.63 kg/m²     General Appearance:    Alert, cooperative, no distress, appears stated age   Head:    Normocephalic, without obvious abnormality, atraumatic   Eyes:    PERRL, conjunctiva/corneas clear, EOM's intact, fundi     benign, both eyes   Ears:    Normal TM's and external ear canals, both ears   Nose:   Nares normal, septum midline, mucosa normal, no drainage    or sinus tenderness   Throat:   Lips, mucosa, and tongue normal; teeth and gums normal   Neck:   Supple, symmetrical, trachea midline, no adenopathy;     thyroid:  no enlargement/tenderness/nodules; no carotid    bruit or JVD   Back:     Symmetric, no curvature, ROM normal, no CVA tenderness   Lungs:     Clear to auscultation bilaterally, respirations unlabored   Chest Wall:    No tenderness or deformity    Heart:    Regular rate and rhythm, S1 and S2 normal, no murmur, rub   or gallop   Breast Exam:    No tenderness, masses, or nipple abnormality   Abdomen:     Soft, non-tender, bowel sounds active all four quadrants,     no masses, no organomegaly   Genitalia:    Normal female without lesion, discharge or tenderness   Rectal:     Normal tone, no masses or tenderness;   Extremities:   Extremities normal, atraumatic, no cyanosis or edema   Pulses:   2+ and symmetric all extremities   Skin:   Skin color, texture, turgor normal, no rashes or lesions   Lymph nodes:   Cervical, supraclavicular, and axillary nodes normal   Neurologic:   CNII-XII intact, normal strength, sensation and reflexes     throughout        Office Visit on 09/13/2021   Component Date Value Ref Range Status   • Age Gdln ACOG Testing 09/13/2021 30-65   Final   • Diagnosis 09/13/2021 Comment   Final    NEGATIVE FOR INTRAEPITHELIAL LESION OR MALIGNANCY.   • Specimen adequacy: 09/13/2021 Comment   Final    Satisfactory for evaluation.  Endocervical and/or squamous metaplastic  cells (endocervical component) are present.   • Clinician Provided ICD-10: 09/13/2021 Comment   Final    Z00.00   • Performed by: 09/13/2021 Comment   Final    Magdalena Perez Cytotechnologist (ASCP)   • . 09/13/2021 .   Final   • Note: 09/13/2021 Comment   Final    The Pap smear is a screening test designed to aid in the detection of  premalignant and malignant conditions of the uterine cervix.  It is not a  diagnostic procedure and should not be used as the sole means of detecting  cervical cancer.  Both false-positive and false-negative reports do occur.   • Method: 09/13/2021 Comment   Final    This liquid based ThinPrep(R) pap test was screened with the  use of an image guided system.   • HPV Aptima 09/13/2021 Negative  Negative Final    This nucleic acid amplification test detects fourteen high-risk  HPV types (16,18,31,33,35,39,45,51,52,56,58,59,66,68) without  differentiation.       Assessment/Plan   Healthy female exam.   Diagnoses and all orders for this visit:    1. Wellness examination (Primary)  -     IGP,Aptima HPV,Age Gdln; Future  -     IGP,Aptima HPV,Age Gdln  -     IGP, Aptima HPV, Rfx 16 / 18,45    2. Encounter for screening mammogram for breast cancer  -     Mammo Screening Digital  Tomosynthesis Bilateral With CAD; Future    3. Screen for colon cancer  -     Ambulatory Referral For Screening Colonoscopy    4. Primary insomnia  -     traZODone (DESYREL) 50 MG tablet; Take 1 tablet by mouth Every Night.  Dispense: 90 tablet; Refill: 0    Other orders  -     cetirizine (zyrTEC) 10 MG tablet; Take 1 tablet by mouth Daily.  Dispense: 90 tablet; Refill: 1  -     fluticasone (Flonase) 50 MCG/ACT nasal spray; 2 sprays into the nostril(s) as directed by provider Daily.  Dispense: 18.2 mL; Refill: 1  -     SCANNED - PAP SMEAR      1. Well exam.  2. Patient Counseling:  --Nutrition: Stressed importance of moderation in sodium/caffeine intake, saturated fat and cholesterol, caloric balance, sufficient intake of fresh fruits, vegetables, fiber, calcium, iron  --Exercise: Stressed the importance of regular exercise.   --Dental health: Discussed importance of regular tooth brushing, flossing, and dental visits.  --Immunizations reviewed.  --Discussed benefits of screening colonoscopy.    3. Discussed the patient's BMI with her.  The BMI is above average; BMI management plan is completed  4. Follow up in one year

## 2021-09-14 ENCOUNTER — TELEPHONE (OUTPATIENT)
Dept: GASTROENTEROLOGY | Facility: CLINIC | Age: 51
End: 2021-09-14

## 2021-09-16 LAB
AGE GDLN ACOG TESTING: NORMAL
CYTOLOGIST CVX/VAG CYTO: NORMAL
CYTOLOGY CVX/VAG DOC CYTO: NORMAL
CYTOLOGY CVX/VAG DOC THIN PREP: NORMAL
DX ICD CODE: NORMAL
HIV 1 & 2 AB SER-IMP: NORMAL
HPV I/H RISK 4 DNA CVX QL PROBE+SIG AMP: NEGATIVE
OTHER STN SPEC: NORMAL
STAT OF ADQ CVX/VAG CYTO-IMP: NORMAL

## 2021-10-25 DIAGNOSIS — E78.5 HYPERLIPIDEMIA, UNSPECIFIED HYPERLIPIDEMIA TYPE: ICD-10-CM

## 2021-10-26 RX ORDER — ATORVASTATIN CALCIUM 20 MG/1
TABLET, FILM COATED ORAL
Qty: 90 TABLET | Refills: 1 | Status: SHIPPED | OUTPATIENT
Start: 2021-10-26 | End: 2022-08-30 | Stop reason: SDUPTHER

## 2021-11-11 ENCOUNTER — TELEPHONE (OUTPATIENT)
Dept: FAMILY MEDICINE CLINIC | Facility: CLINIC | Age: 51
End: 2021-11-11

## 2021-11-11 DIAGNOSIS — M25.511 ACUTE PAIN OF RIGHT SHOULDER: Primary | ICD-10-CM

## 2021-11-11 NOTE — TELEPHONE ENCOUNTER
Caller: Aisha Appiah    Relationship: Self    Best call back number:   Aisha Appiah (Self) [phone] (M)       What is the medical concern/diagnosis:   AFTER THE COVID VACCINE, SHE IS STILL HAVING PAIN IN HER ARM - SHE'S NOT SURE IF IT'S BONE OR MUSCULAR     What specialty or service is being requested:     POSSIBLY ORTHOPEDICS     PLEASE CALL ADVISE

## 2021-11-15 NOTE — TELEPHONE ENCOUNTER
Spoke to pt and she said that she seen you in August for her rt shoulder/arm pain. She got the covid vaccine in July and the pain has worsened. She just wants to see a specialist. Also she wants her ref from last year renewed. The one for the sebaceous cyst. Initially put in 9/2020.

## 2021-11-30 ENCOUNTER — TELEPHONE (OUTPATIENT)
Dept: FAMILY MEDICINE CLINIC | Facility: CLINIC | Age: 51
End: 2021-11-30

## 2021-11-30 DIAGNOSIS — R05.9 COUGH: Primary | ICD-10-CM

## 2021-11-30 RX ORDER — GUAIFENESIN AND CODEINE PHOSPHATE 100; 10 MG/5ML; MG/5ML
5 SOLUTION ORAL 3 TIMES DAILY PRN
Qty: 120 ML | Refills: 0 | Status: SHIPPED | OUTPATIENT
Start: 2021-11-30 | End: 2022-04-04

## 2021-12-01 RX ORDER — PREDNISONE 10 MG/1
10 TABLET ORAL 2 TIMES DAILY
Qty: 10 TABLET | Refills: 0 | Status: SHIPPED | OUTPATIENT
Start: 2021-12-01 | End: 2022-04-04

## 2021-12-02 ENCOUNTER — OFFICE VISIT (OUTPATIENT)
Dept: ORTHOPEDIC SURGERY | Facility: CLINIC | Age: 51
End: 2021-12-02

## 2021-12-02 VITALS — RESPIRATION RATE: 16 BRPM | WEIGHT: 254 LBS | BODY MASS INDEX: 39.87 KG/M2 | OXYGEN SATURATION: 97 % | HEIGHT: 67 IN

## 2021-12-02 DIAGNOSIS — S46.819A STRAIN OF DELTOID MUSCLE, INITIAL ENCOUNTER: ICD-10-CM

## 2021-12-02 DIAGNOSIS — M25.511 ACUTE PAIN OF RIGHT SHOULDER: Primary | ICD-10-CM

## 2021-12-02 PROCEDURE — 99203 OFFICE O/P NEW LOW 30 MIN: CPT | Performed by: FAMILY MEDICINE

## 2021-12-02 RX ORDER — MELOXICAM 15 MG/1
TABLET ORAL
COMMUNITY
Start: 2021-10-25 | End: 2022-05-13 | Stop reason: SDUPTHER

## 2021-12-02 NOTE — PROGRESS NOTES
"Primary Care Sports Medicine Office Visit Note     Patient ID: Aisha Appiah is a 50 y.o. female.    Chief Complaint:  Chief Complaint   Patient presents with   • Right Shoulder - Follow-up     HPI:    Ms. Aisha Appiah is a 50 y.o. female who presents to the clinic today for R shoulder pain. She denies any specific injury, no falls, no trauma. Pain started immediatly after COVID vaccination. She points to lateral deltoid when describing her pain. No radiation. No numbness or tingling. She denies any weakness.    Past Medical History:   Diagnosis Date   • Hypertension        Past Surgical History:   Procedure Laterality Date   • TUBAL ABDOMINAL LIGATION         Family History   Problem Relation Age of Onset   • No Known Problems Mother    • No Known Problems Father      Social History     Occupational History   • Not on file   Tobacco Use   • Smoking status: Former Smoker     Years: 15.00   • Smokeless tobacco: Never Used   • Tobacco comment: quit 13 yrs ago   Vaping Use   • Vaping Use: Never used   Substance and Sexual Activity   • Alcohol use: Yes     Comment: caffeine 1c qd   • Drug use: Defer   • Sexual activity: Defer      Review of Systems   Constitutional: Negative for activity change and fever.   Musculoskeletal: Positive for arthralgias.   Skin: Negative for color change and rash.   Neurological: Negative for weakness.     Objective:    Resp 16   Ht 170.2 cm (67\")   Wt 115 kg (254 lb)   SpO2 97%   BMI 39.78 kg/m²     Physical Examination:  Physical Exam  Vitals and nursing note reviewed.   Constitutional:       General: She is not in acute distress.     Appearance: She is well-developed. She is not diaphoretic.   HENT:      Head: Normocephalic and atraumatic.   Eyes:      Conjunctiva/sclera: Conjunctivae normal.   Pulmonary:      Effort: Pulmonary effort is normal. No respiratory distress.   Skin:     General: Skin is warm.      Capillary Refill: Capillary refill takes less than 2 seconds. " "  Neurological:      Mental Status: She is alert.       Right Shoulder Exam     Tenderness   The patient is experiencing no tenderness.    Range of Motion   Active abduction: normal   Passive abduction: normal   Extension: normal   External rotation: normal   Forward flexion: normal   Internal rotation 0 degrees:  Mid thoracic normal     Muscle Strength   Abduction: 5/5   Internal rotation: 5/5   External rotation: 5/5   Supraspinatus: 5/5   Subscapularis: 5/5   Biceps: 5/5     Tests   Apprehension: negative  Christie test: negative  Impingement: negative  Sulcus: absent    Other   Erythema: absent  Sensation: normal  Pulse: present    Comments:  Negative Devan, negative resisted external rotation, negative liftoff.  Negative [place]'s, negative scarf.  Negative Neer.  Negative speeds/Yergason's.  Mild tenderness palpation with palpable nodular mass of deltoid musculature      Cervical range of motion is full with no tenderness to palpation to the bony midline or paraspinal cervical musculature.  Spurling's maneuver to the right is negative.        Imaging and other tests:  Three-view x-ray of the right shoulder yields no acute bony pathology.    Assessment and Plan:    1. Acute pain of right shoulder  - XR Shoulder 2+ View Right    2. Strain of deltoid muscle, initial encounter    I discussed potential pathology with patient today.  She may have mild intramuscular reaction/scar tissue formation.  We discussed topical compounded pain cream for mild pain relief, and likely continued improvement of this issue status post injection.  RTC to me as needed.    Iglesia LÓPEZ \"Chance\" Will REID DO, CAQSM  12/02/21  15:27 EST    Disclaimer: Please note that areas of this note were completed with computer voice recognition software.  Quite often unanticipated grammatical, syntax, homophones, and other interpretive errors are inadvertently transcribed by the computer software. Please excuse any errors that have escaped final " proofreading.

## 2021-12-06 ENCOUNTER — OFFICE VISIT (OUTPATIENT)
Dept: SURGERY | Facility: CLINIC | Age: 51
End: 2021-12-06

## 2021-12-06 VITALS
RESPIRATION RATE: 18 BRPM | DIASTOLIC BLOOD PRESSURE: 105 MMHG | BODY MASS INDEX: 40.21 KG/M2 | TEMPERATURE: 98 F | SYSTOLIC BLOOD PRESSURE: 146 MMHG | HEIGHT: 67 IN | WEIGHT: 256.2 LBS | OXYGEN SATURATION: 97 % | HEART RATE: 79 BPM

## 2021-12-06 DIAGNOSIS — L72.3 SEBACEOUS CYST: Primary | ICD-10-CM

## 2021-12-06 PROCEDURE — 99214 OFFICE O/P EST MOD 30 MIN: CPT | Performed by: SURGERY

## 2021-12-07 NOTE — PROGRESS NOTES
"GENERAL SURGERY CONSULTATION NOTE    Consult requested by: Dr. Beaulieu    Patient Care Team:  Ariane Beaulieu MD as PCP - General (Family Medicine)    Reason for consult: Sebaceous cyst of the left shoulder    Subjective   From 9/2/2020:  Patient is a 50 y.o. female presents with a persistent sebaceous cyst on her left shoulder which causes her some discomfort.  She states that it has been there for many years and she goes through periods where the area will become swollen, painful, tender, and drain a small amount of dark material.  She states that it has not done this for a couple of weeks, but usually when it does do there she is started on antibiotics and it usually resolves.  She will use heating pads occasionally to help draw out some of the fluid.  The biggest problem is that the location of the cyst tends to be in the area of the strap of her brassiere which causes significant amounts of irritation.  She is tired of having this area and wishes to have it removed.    From 12/6/2021:  The patient was scheduled for surgical excision of her left shoulder sebaceous cyst, but reportedly \"chickened out\" secondary to Covid. She elected to forego surgical intervention at that time. She reports no significant change in her sebaceous cyst since she last saw me in the office. She wishes to have it removed at this time.    Review of Systems   Constitutional: Negative for appetite change, chills and fever.   HENT: Negative for congestion and sore throat.    Respiratory: Negative for cough and shortness of breath.    Cardiovascular: Negative for chest pain and palpitations.   Gastrointestinal: Negative for abdominal pain, constipation, diarrhea, nausea, vomiting and GERD.   Genitourinary: Negative for difficulty urinating, dysuria and frequency.   Musculoskeletal: Negative for arthralgias and back pain.   Skin: Positive for skin lesions. Negative for rash.   Neurological: Negative for dizziness, seizures and memory " problem.   Hematological: Negative for adenopathy. Does not bruise/bleed easily.   Psychiatric/Behavioral: Negative for sleep disturbance and depressed mood.        History  Past Medical History:   Diagnosis Date   • Hypertension      Past Surgical History:   Procedure Laterality Date   • TUBAL ABDOMINAL LIGATION       Family History   Problem Relation Age of Onset   • No Known Problems Mother    • No Known Problems Father      Social History     Tobacco Use   • Smoking status: Former Smoker     Years: 15.00   • Smokeless tobacco: Never Used   • Tobacco comment: quit 13 yrs ago   Vaping Use   • Vaping Use: Never used   Substance Use Topics   • Alcohol use: Yes     Comment: caffeine 1c qd   • Drug use: Defer     (Not in a hospital admission)    Allergies:  Patient has no known allergies.    Objective     Vital Signs       Physical Exam   Constitutional: She is oriented to person, place, and time. She appears well-developed.   HENT:   Head: Normocephalic and atraumatic.   Eyes: Pupils are equal, round, and reactive to light.   Cardiovascular: Normal rate and regular rhythm.   Pulmonary/Chest: Effort normal and breath sounds normal.   Abdominal: Soft. She exhibits no distension. There is no abdominal tenderness. No hernia.   Musculoskeletal: Normal range of motion.   Lymphadenopathy:     She has no cervical adenopathy.   Neurological: She is alert and oriented to person, place, and time.   Skin: Skin is warm and dry. No rash noted.   On the posterior/superior aspect of the left shoulder the patient has a 1.5 cm sebaceous cyst with a central pit with some black debris within the pit.  It is not inflamed, and does not have any surrounding erythema or induration to suggest active infection at this time.   Vitals reviewed.      Results Review:   Lab Results (last 24 hours)     ** No results found for the last 24 hours. **        No radiology results for the last day      I reviewed the patient's new imaging results and  agree with the interpretation.  I reviewed the patient's other test results and agree with the interpretation    Assessment/Plan     Active Problems:  Sebaceous cyst of the skin of the left shoulder    Discussed with patient her surgical options which would include excision and closure.  We reviewed the risks of surgery which include risks of anesthesia, bleeding, infection, pain, and wound healing complications.  The patient understands, and agrees to proceed with surgery as she is tired of having this recurrent sebaceous cyst on her left shoulder.  We will schedule her in the near future for an in office procedure in order to remove the sebaceous cyst.    Charlie Choi MD  12/07/21  18:37 EST

## 2022-01-03 ENCOUNTER — PROCEDURE VISIT (OUTPATIENT)
Dept: SURGERY | Facility: CLINIC | Age: 52
End: 2022-01-03

## 2022-01-03 VITALS
WEIGHT: 248.8 LBS | HEART RATE: 81 BPM | HEIGHT: 67 IN | SYSTOLIC BLOOD PRESSURE: 140 MMHG | TEMPERATURE: 97.5 F | BODY MASS INDEX: 39.05 KG/M2 | DIASTOLIC BLOOD PRESSURE: 93 MMHG | OXYGEN SATURATION: 98 % | RESPIRATION RATE: 18 BRPM

## 2022-01-03 DIAGNOSIS — L72.3 SEBACEOUS CYST: Primary | ICD-10-CM

## 2022-01-03 PROCEDURE — 88304 TISSUE EXAM BY PATHOLOGIST: CPT | Performed by: SURGERY

## 2022-01-03 PROCEDURE — 11401 EXC TR-EXT B9+MARG 0.6-1 CM: CPT | Performed by: SURGERY

## 2022-01-03 NOTE — PROGRESS NOTES
Operative Note    Patient Name:  Aisha KHAN Bijal  YOB: 1970    Date of Surgery:  1/3/2022      Indications: The patient is a 51-year-old lady with a sebaceous cyst of the left shoulder.  We discussed the risk, benefits, and alternatives to surgery, the patient agreed to proceed with excision under local anesthesia in the office.    Pre-op Diagnosis:   Left shoulder sebaceous cyst    Post-op Diagnosis:  Post-Op Diagnosis Codes:  Same    Procedure/CPT® Codes:    Excision of left shoulder sebaceous cyst measuring 3 x 1 cm.      Staff:  MD Steph-surgeon    Anesthesia: 1% lidocaine with epinephrine    Estimated Blood Loss: Minimal    Implants:    None    Specimen:          A: Left shoulder sebaceous cyst    Findings: Left shoulder sebaceous cyst measuring approximately 1 cm in diameter.    Complications: None, immediately    Description of Procedure: After obtaining informed consent, the patient was positioned in the right lateral decubitus position.  The area of the cyst was then marked, and the skin was prepped using chlorhexidine prep.  It was then draped in the usual sterile fashion.  Local anesthesia was injected around the skin markings.  I then made a 3 x 1 cm elliptical incision in the skin and divided the dermis down to the level of the subcutaneous fat.  Once at the level of the subcutaneous fat I used sharp dissection to excise the cyst away from the underlying subcutaneous fat using a skin knife to divide the fibrous attachments.  With the cyst not completely removed it was placed in formalin and passed off the field as specimen.  I then closed the incision using interrupted 3-0 Vicryl to reapproximate the dermis and running 4-0 Monocryl in a subcuticular fashion to reapproximate the skin.  The wound was dressed with skin affix skin glue.  The patient tolerated the procedure well, and was able to leave the office under her own power.  Sponge and instrument counts were correct at the  conclusion of the procedure.    Charlie Choi MD     Date: 1/3/2022  Time: 12:02 EST

## 2022-01-05 LAB
LAB AP CASE REPORT: NORMAL
LAB AP CLINICAL INFORMATION: NORMAL
PATH REPORT.FINAL DX SPEC: NORMAL
PATH REPORT.GROSS SPEC: NORMAL

## 2022-01-10 ENCOUNTER — TELEMEDICINE (OUTPATIENT)
Dept: FAMILY MEDICINE CLINIC | Facility: CLINIC | Age: 52
End: 2022-01-10

## 2022-01-10 DIAGNOSIS — I10 PRIMARY HYPERTENSION: Primary | ICD-10-CM

## 2022-01-10 DIAGNOSIS — K21.9 GASTROESOPHAGEAL REFLUX DISEASE, UNSPECIFIED WHETHER ESOPHAGITIS PRESENT: ICD-10-CM

## 2022-01-10 PROCEDURE — 99213 OFFICE O/P EST LOW 20 MIN: CPT | Performed by: FAMILY MEDICINE

## 2022-01-10 RX ORDER — OMEPRAZOLE 20 MG/1
20 CAPSULE, DELAYED RELEASE ORAL DAILY
Qty: 90 CAPSULE | Refills: 1 | Status: SHIPPED | OUTPATIENT
Start: 2022-01-10 | End: 2022-04-04 | Stop reason: SDUPTHER

## 2022-01-10 RX ORDER — LISINOPRIL AND HYDROCHLOROTHIAZIDE 12.5; 1 MG/1; MG/1
1 TABLET ORAL DAILY
Qty: 90 TABLET | Refills: 1 | Status: SHIPPED | OUTPATIENT
Start: 2022-01-10 | End: 2022-07-19

## 2022-01-11 ENCOUNTER — TELEPHONE (OUTPATIENT)
Dept: SURGERY | Facility: CLINIC | Age: 52
End: 2022-01-11

## 2022-01-11 NOTE — TELEPHONE ENCOUNTER
Spoke with pt regarding how she was after her in office proc. Stated doing well. Just a little itchy. Asked if Dr. Choi had called on her path results, stated no one had called. Informed her I would check with naomi and call her back.    Per Dr. Choi: no malignancy, showed epidermal cyst.     Informed pt of this and stated understood. Will call if any questions or concerns arise.

## 2022-03-16 DIAGNOSIS — F51.01 PRIMARY INSOMNIA: ICD-10-CM

## 2022-03-16 RX ORDER — TRAZODONE HYDROCHLORIDE 50 MG/1
TABLET ORAL
Qty: 90 TABLET | Refills: 1 | Status: SHIPPED | OUTPATIENT
Start: 2022-03-16 | End: 2022-07-20 | Stop reason: SDUPTHER

## 2022-03-18 DIAGNOSIS — F51.01 PRIMARY INSOMNIA: ICD-10-CM

## 2022-03-18 RX ORDER — FLUTICASONE PROPIONATE 50 MCG
2 SPRAY, SUSPENSION (ML) NASAL DAILY
Qty: 18.2 ML | Refills: 1 | Status: SHIPPED | OUTPATIENT
Start: 2022-03-18 | End: 2022-07-20 | Stop reason: SDUPTHER

## 2022-03-18 RX ORDER — TRAZODONE HYDROCHLORIDE 50 MG/1
50 TABLET ORAL NIGHTLY
Qty: 90 TABLET | Refills: 1 | OUTPATIENT
Start: 2022-03-18

## 2022-03-18 NOTE — TELEPHONE ENCOUNTER
Caller: Aisha Appiah    Relationship: Self    Best call back number: [phone]    Requested Prescriptions:   Requested Prescriptions     Pending Prescriptions Disp Refills   • traZODone (DESYREL) 50 MG tablet 90 tablet 1     Sig: Take 1 tablet by mouth Every Night.   • fluticasone (Flonase) 50 MCG/ACT nasal spray 18.2 mL 1     Si sprays into the nostril(s) as directed by provider Daily.        Pharmacy where request should be sent: [facility] [address] [place] [address], [place] - [address] AT [place] [address] - [phone] [facility] [phone] FX       Does the patient have less than a 3 day supply:  [x] Yes  [] No    [facility], [facility] Rep   22 12:21 EDT

## 2022-04-04 ENCOUNTER — TELEPHONE (OUTPATIENT)
Dept: FAMILY MEDICINE CLINIC | Facility: CLINIC | Age: 52
End: 2022-04-04

## 2022-04-04 ENCOUNTER — OFFICE VISIT (OUTPATIENT)
Dept: FAMILY MEDICINE CLINIC | Facility: CLINIC | Age: 52
End: 2022-04-04

## 2022-04-04 VITALS
OXYGEN SATURATION: 97 % | TEMPERATURE: 98.2 F | BODY MASS INDEX: 39.16 KG/M2 | HEART RATE: 73 BPM | SYSTOLIC BLOOD PRESSURE: 147 MMHG | DIASTOLIC BLOOD PRESSURE: 93 MMHG | WEIGHT: 250 LBS

## 2022-04-04 DIAGNOSIS — Z12.11 SCREEN FOR COLON CANCER: ICD-10-CM

## 2022-04-04 DIAGNOSIS — K21.9 GASTROESOPHAGEAL REFLUX DISEASE, UNSPECIFIED WHETHER ESOPHAGITIS PRESENT: ICD-10-CM

## 2022-04-04 DIAGNOSIS — I10 PRIMARY HYPERTENSION: ICD-10-CM

## 2022-04-04 DIAGNOSIS — E78.5 HYPERLIPIDEMIA, UNSPECIFIED HYPERLIPIDEMIA TYPE: ICD-10-CM

## 2022-04-04 DIAGNOSIS — Z11.59 NEED FOR HEPATITIS C SCREENING TEST: ICD-10-CM

## 2022-04-04 DIAGNOSIS — Z00.00 WELLNESS EXAMINATION: Primary | ICD-10-CM

## 2022-04-04 PROCEDURE — 99396 PREV VISIT EST AGE 40-64: CPT | Performed by: FAMILY MEDICINE

## 2022-04-04 RX ORDER — OMEPRAZOLE 20 MG/1
20 CAPSULE, DELAYED RELEASE ORAL DAILY
Qty: 90 CAPSULE | Refills: 3 | Status: SHIPPED | OUTPATIENT
Start: 2022-04-04 | End: 2023-03-01

## 2022-04-04 RX ORDER — ASPIRIN 81 MG/1
81 TABLET ORAL DAILY
Start: 2022-04-04

## 2022-04-04 NOTE — TELEPHONE ENCOUNTER
Caller: Bijal Aisha BILL    Relationship: Self    Best call back number: [phone]*    What is the best time to reach you: ANYTIME    Who are you requesting to speak with (clinical staff, provider,  specific staff member): CLINICAL    What was the call regarding: PATIENT CALLED AND STATED THAT THE OMEPRAZOLE WAS DENIED BY PATIENT'S INSURANCE. THE PATIENT IS REQUESTING AN ALTERNATIVE TO BE SENT TO HER PHARMACY THAT WILL BE COVERED BY HER INSURANCE.    PHARMACY CONFIRMED:    [facility] [place], [place] - [address] AT [facility] [address] - [phone]  - [phone]   [phone]    Do you require a callback: NO

## 2022-04-04 NOTE — PROGRESS NOTES
Tyesha Appiah is a 51 y.o. female and is here for a comprehensive physical exam. The patient reports problems - feet feel cold.    Do you take any herbs or supplements that were not prescribed by a doctor? no  Are you taking calcium supplements? no  Are you taking aspirin daily? no    The following portions of the patient's history were reviewed and updated as appropriate: allergies, current medications, past family history, past medical history, past social history, past surgical history and problem list.    Review of Systems  Do you have pain that bothers you in your daily life? no  Pertinent items are noted in HPI.    Objective   /93 (BP Location: Left arm, Patient Position: Sitting, Cuff Size: Adult)   Pulse 73   Temp 98.2 °F (36.8 °C) (Infrared)   Wt 113 kg (250 lb)   SpO2 97%   BMI 39.16 kg/m²   General appearance: alert, appears stated age and cooperative  Head: Normocephalic, without obvious abnormality, atraumatic  Throat: lips, mucosa, and tongue normal; teeth and gums normal  Neck: no adenopathy, no JVD, supple, symmetrical, trachea midline and thyroid not enlarged, symmetric, no tenderness/mass/nodules  Lungs: clear to auscultation bilaterally  Heart: regular rate and rhythm, S1, S2 normal, no murmur, click, rub or gallop  Extremities: extremities normal, atraumatic, no cyanosis or edema  Skin: Skin color, texture, turgor normal. No rashes or lesions  Lymph nodes: Cervical, supraclavicular, and axillary nodes normal.  Neurologic: Grossly normal     No visits with results within 1 Week(s) from this visit.   Latest known visit with results is:   Procedure visit on 01/03/2022   Component Date Value Ref Range Status   • Case Report 01/03/2022    Final                    Value:Surgical Pathology Report                         Case: [id number]                                  Authorizing Provider:  Charlie Choi,   Collected:           01/03/2022 12:02 PM                                  MD                                                                           Ordering Location:     [facility]     Received:            01/03/2022 12:02 PM                                 [facility] SURGERY                                                        Pathologist:           Vito Dillon MD                                                            Specimen:    Shoulder, Left                                                                            • Clinical Information 01/03/2022    Final                    Value:This result contains rich text formatting which cannot be displayed here.   • Final Diagnosis 01/03/2022    Final                    Value:This result contains rich text formatting which cannot be displayed here.   • Gross Description 01/03/2022    Final                    Value:This result contains rich text formatting which cannot be displayed here.       Assessment/Plan   Healthy female exam.  Diagnoses and all orders for this visit:    1. Wellness examination (Primary)  -     Comprehensive Metabolic Panel  -     Lipid Panel  -     Hemoglobin A1c  -     CBC & Differential  -     TSH    2. Gastroesophageal reflux disease, unspecified whether esophagitis present  -     omeprazole (priLOSEC) 20 MG capsule; Take 1 capsule by mouth Daily.  Dispense: 90 capsule; Refill: 3    3. Need for hepatitis C screening test  -     Hepatitis C Antibody; Future    4. Primary hypertension  -     Comprehensive Metabolic Panel  -     Lipid Panel  -     CBC & Differential    5. Hyperlipidemia, unspecified hyperlipidemia type  -     Lipid Panel    6. Screen for colon cancer  -     Cologuard - Stool, Per Rectum; Future    Other orders  -     aspirin (aspirin) 81 MG EC tablet; Take 1 tablet by mouth Daily.      1.Well exam  2. Patient Counseling:  --Nutrition: Stressed importance of moderation in sodium/caffeine intake, saturated fat and cholesterol, caloric balance, sufficient intake of  fresh fruits, vegetables, fiber, calcium, iron  --Exercise: Stressed the importance of regular exercise. .   --Dental health: Discussed importance of regular tooth brushing, flossing, and dental visits.  --Immunizations reviewed.  --Discussed benefits of screening colonoscopy.  --After hours service discussed with patient    3. Discussed the patient's BMI with her.  The BMI is above average; BMI management plan is completed  4. Follow up in one year

## 2022-04-05 ENCOUNTER — LAB (OUTPATIENT)
Dept: FAMILY MEDICINE CLINIC | Facility: CLINIC | Age: 52
End: 2022-04-05

## 2022-04-05 DIAGNOSIS — Z11.59 NEED FOR HEPATITIS C SCREENING TEST: ICD-10-CM

## 2022-04-05 LAB
ALBUMIN SERPL-MCNC: 4.6 G/DL (ref 3.5–5.2)
ALBUMIN/GLOB SERPL: 1.7 G/DL
ALP SERPL-CCNC: 59 U/L (ref 39–117)
ALT SERPL W P-5'-P-CCNC: 18 U/L (ref 1–33)
ANION GAP SERPL CALCULATED.3IONS-SCNC: 7 MMOL/L (ref 5–15)
AST SERPL-CCNC: 20 U/L (ref 1–32)
BASOPHILS # BLD AUTO: 0.03 10*3/MM3 (ref 0–0.2)
BASOPHILS NFR BLD AUTO: 0.6 % (ref 0–1.5)
BILIRUB SERPL-MCNC: 0.3 MG/DL (ref 0–1.2)
BUN SERPL-MCNC: 12 MG/DL (ref 6–20)
BUN/CREAT SERPL: 14.6 (ref 7–25)
CALCIUM SPEC-SCNC: 9.3 MG/DL (ref 8.6–10.5)
CHLORIDE SERPL-SCNC: 100 MMOL/L (ref 98–107)
CHOLEST SERPL-MCNC: 277 MG/DL (ref 0–200)
CO2 SERPL-SCNC: 30 MMOL/L (ref 22–29)
CREAT SERPL-MCNC: 0.82 MG/DL (ref 0.57–1)
DEPRECATED RDW RBC AUTO: 45.6 FL (ref 37–54)
EGFRCR SERPLBLD CKD-EPI 2021: 87.8 ML/MIN/1.73
EOSINOPHIL # BLD AUTO: 0.19 10*3/MM3 (ref 0–0.4)
EOSINOPHIL NFR BLD AUTO: 3.6 % (ref 0.3–6.2)
ERYTHROCYTE [DISTWIDTH] IN BLOOD BY AUTOMATED COUNT: 13.1 % (ref 12.3–15.4)
GLOBULIN UR ELPH-MCNC: 2.7 GM/DL
GLUCOSE SERPL-MCNC: 101 MG/DL (ref 65–99)
HBA1C MFR BLD: 5.9 % (ref 3.5–5.6)
HCT VFR BLD AUTO: 39.4 % (ref 34–46.6)
HCV AB SER DONR QL: NORMAL
HDLC SERPL-MCNC: 72 MG/DL (ref 40–60)
HGB BLD-MCNC: 13.5 G/DL (ref 12–15.9)
IMM GRANULOCYTES # BLD AUTO: 0.01 10*3/MM3 (ref 0–0.05)
IMM GRANULOCYTES NFR BLD AUTO: 0.2 % (ref 0–0.5)
LDLC SERPL CALC-MCNC: 187 MG/DL (ref 0–100)
LDLC/HDLC SERPL: 2.56 {RATIO}
LYMPHOCYTES # BLD AUTO: 2.16 10*3/MM3 (ref 0.7–3.1)
LYMPHOCYTES NFR BLD AUTO: 40.9 % (ref 19.6–45.3)
MCH RBC QN AUTO: 32.8 PG (ref 26.6–33)
MCHC RBC AUTO-ENTMCNC: 34.3 G/DL (ref 31.5–35.7)
MCV RBC AUTO: 95.6 FL (ref 79–97)
MONOCYTES # BLD AUTO: 0.42 10*3/MM3 (ref 0.1–0.9)
MONOCYTES NFR BLD AUTO: 8 % (ref 5–12)
NEUTROPHILS NFR BLD AUTO: 2.47 10*3/MM3 (ref 1.7–7)
NEUTROPHILS NFR BLD AUTO: 46.7 % (ref 42.7–76)
NRBC BLD AUTO-RTO: 0 /100 WBC (ref 0–0.2)
PLATELET # BLD AUTO: 206 10*3/MM3 (ref 140–450)
PMV BLD AUTO: 11 FL (ref 6–12)
POTASSIUM SERPL-SCNC: 3.9 MMOL/L (ref 3.5–5.2)
PROT SERPL-MCNC: 7.3 G/DL (ref 6–8.5)
RBC # BLD AUTO: 4.12 10*6/MM3 (ref 3.77–5.28)
SODIUM SERPL-SCNC: 137 MMOL/L (ref 136–145)
TRIGL SERPL-MCNC: 105 MG/DL (ref 0–150)
TSH SERPL DL<=0.05 MIU/L-ACNC: 1.85 UIU/ML (ref 0.27–4.2)
VLDLC SERPL-MCNC: 18 MG/DL (ref 5–40)
WBC NRBC COR # BLD: 5.28 10*3/MM3 (ref 3.4–10.8)

## 2022-04-05 PROCEDURE — 86803 HEPATITIS C AB TEST: CPT | Performed by: FAMILY MEDICINE

## 2022-04-05 PROCEDURE — 83036 HEMOGLOBIN GLYCOSYLATED A1C: CPT | Performed by: FAMILY MEDICINE

## 2022-04-05 PROCEDURE — 80061 LIPID PANEL: CPT | Performed by: FAMILY MEDICINE

## 2022-04-05 PROCEDURE — 80050 GENERAL HEALTH PANEL: CPT | Performed by: FAMILY MEDICINE

## 2022-04-05 PROCEDURE — 36415 COLL VENOUS BLD VENIPUNCTURE: CPT | Performed by: FAMILY MEDICINE

## 2022-05-13 NOTE — TELEPHONE ENCOUNTER
Caller: Aisha Appiah    Relationship: Self    Best call back number: [phone]    Requested Prescriptions:   Requested Prescriptions     Pending Prescriptions Disp Refills   • meloxicam (MOBIC) 15 MG tablet          Pharmacy where request should be sent: [facility] - [place], [place] - [address] AT [facility] [address] - [phone] [facility] [phone] FX     Additional details provided by patient: PATIENT ALSO WOULD LIKE A MUSCLE RELAXER TOO.  THEY DID NOT APPROVE HER STOMACH MEDICATION - PRILOSEC.  CAN YOU CALL SOMETHING ELSE IN FOR HER.      Does the patient have less than a 3 day supply:  [x] Yes  [] No    Hernandez Rankin Rep   05/13/22 09:49 EDT

## 2022-05-15 RX ORDER — MELOXICAM 15 MG/1
15 TABLET ORAL DAILY
Qty: 90 TABLET | Refills: 0 | Status: SHIPPED | OUTPATIENT
Start: 2022-05-15 | End: 2022-07-19

## 2022-07-18 ENCOUNTER — TELEPHONE (OUTPATIENT)
Dept: FAMILY MEDICINE CLINIC | Facility: CLINIC | Age: 52
End: 2022-07-18

## 2022-07-18 DIAGNOSIS — I10 PRIMARY HYPERTENSION: ICD-10-CM

## 2022-07-18 NOTE — TELEPHONE ENCOUNTER
I can look at the moles at her appointment on 7/20/22 but I will not be able to remove them that day.  I do not have enough time allotted that day to do it.

## 2022-07-18 NOTE — TELEPHONE ENCOUNTER
Caller: Aisha Appiah    Relationship to patient: Self    Best call back number: [phone]       Chief complaint: PATIENT STATES SHE HAS A FEW MOLES THAT SHE IS WANTING TO GET REMOVED    Type of visit: IN-OFFICE PROCEDURE    Requested date: 7/20/22        Additional notes:PATIENT IS WANTING TO GET THIS DONE DURING HER APPOINTMENT ON 7/20/22    PLEASE ADVISE

## 2022-07-19 RX ORDER — LISINOPRIL AND HYDROCHLOROTHIAZIDE 12.5; 1 MG/1; MG/1
TABLET ORAL
Qty: 90 TABLET | Refills: 1 | Status: SHIPPED | OUTPATIENT
Start: 2022-07-19 | End: 2022-07-20

## 2022-07-19 RX ORDER — MELOXICAM 15 MG/1
TABLET ORAL
Qty: 90 TABLET | Refills: 1 | Status: SHIPPED | OUTPATIENT
Start: 2022-07-19 | End: 2022-07-20

## 2022-07-20 ENCOUNTER — OFFICE VISIT (OUTPATIENT)
Dept: FAMILY MEDICINE CLINIC | Facility: CLINIC | Age: 52
End: 2022-07-20

## 2022-07-20 VITALS
WEIGHT: 246 LBS | TEMPERATURE: 98.1 F | SYSTOLIC BLOOD PRESSURE: 151 MMHG | OXYGEN SATURATION: 96 % | HEART RATE: 71 BPM | BODY MASS INDEX: 38.61 KG/M2 | HEIGHT: 67 IN | DIASTOLIC BLOOD PRESSURE: 100 MMHG

## 2022-07-20 DIAGNOSIS — M25.562 CHRONIC PAIN OF BOTH KNEES: ICD-10-CM

## 2022-07-20 DIAGNOSIS — M25.561 CHRONIC PAIN OF BOTH KNEES: ICD-10-CM

## 2022-07-20 DIAGNOSIS — F51.01 PRIMARY INSOMNIA: ICD-10-CM

## 2022-07-20 DIAGNOSIS — G89.29 CHRONIC PAIN OF BOTH KNEES: ICD-10-CM

## 2022-07-20 DIAGNOSIS — I10 PRIMARY HYPERTENSION: Primary | ICD-10-CM

## 2022-07-20 DIAGNOSIS — J30.1 ALLERGIC RHINITIS DUE TO POLLEN, UNSPECIFIED SEASONALITY: ICD-10-CM

## 2022-07-20 PROCEDURE — 99214 OFFICE O/P EST MOD 30 MIN: CPT | Performed by: FAMILY MEDICINE

## 2022-07-20 RX ORDER — LISINOPRIL AND HYDROCHLOROTHIAZIDE 25; 20 MG/1; MG/1
1 TABLET ORAL DAILY
Qty: 90 TABLET | Refills: 1 | Status: SHIPPED | OUTPATIENT
Start: 2022-07-20 | End: 2022-12-14

## 2022-07-20 RX ORDER — TRAZODONE HYDROCHLORIDE 50 MG/1
50 TABLET ORAL 2 TIMES DAILY
Qty: 180 TABLET | Refills: 1 | Status: SHIPPED | OUTPATIENT
Start: 2022-07-20

## 2022-07-20 RX ORDER — CETIRIZINE HYDROCHLORIDE 10 MG/1
10 TABLET ORAL DAILY
Qty: 90 TABLET | Refills: 1 | Status: SHIPPED | OUTPATIENT
Start: 2022-07-20

## 2022-07-20 RX ORDER — FLUTICASONE PROPIONATE 50 MCG
2 SPRAY, SUSPENSION (ML) NASAL DAILY
Qty: 18.2 ML | Refills: 1 | Status: SHIPPED | OUTPATIENT
Start: 2022-07-20

## 2022-08-30 ENCOUNTER — LAB (OUTPATIENT)
Dept: FAMILY MEDICINE CLINIC | Facility: CLINIC | Age: 52
End: 2022-08-30

## 2022-08-30 ENCOUNTER — OFFICE VISIT (OUTPATIENT)
Dept: FAMILY MEDICINE CLINIC | Facility: CLINIC | Age: 52
End: 2022-08-30

## 2022-08-30 VITALS
SYSTOLIC BLOOD PRESSURE: 124 MMHG | DIASTOLIC BLOOD PRESSURE: 86 MMHG | WEIGHT: 249 LBS | TEMPERATURE: 98.4 F | HEART RATE: 72 BPM | BODY MASS INDEX: 39 KG/M2 | OXYGEN SATURATION: 96 %

## 2022-08-30 DIAGNOSIS — R73.9 HYPERGLYCEMIA: ICD-10-CM

## 2022-08-30 DIAGNOSIS — E78.5 HYPERLIPIDEMIA, UNSPECIFIED HYPERLIPIDEMIA TYPE: ICD-10-CM

## 2022-08-30 DIAGNOSIS — R06.02 SHORTNESS OF BREATH: ICD-10-CM

## 2022-08-30 DIAGNOSIS — Z87.891 FORMER SMOKER: Primary | ICD-10-CM

## 2022-08-30 LAB
ALBUMIN SERPL-MCNC: 4.1 G/DL (ref 3.5–5.2)
ALBUMIN/GLOB SERPL: 1.4 G/DL
ALP SERPL-CCNC: 56 U/L (ref 39–117)
ALT SERPL W P-5'-P-CCNC: 14 U/L (ref 1–33)
ANION GAP SERPL CALCULATED.3IONS-SCNC: 10.4 MMOL/L (ref 5–15)
AST SERPL-CCNC: 20 U/L (ref 1–32)
BILIRUB SERPL-MCNC: 0.3 MG/DL (ref 0–1.2)
BUN SERPL-MCNC: 11 MG/DL (ref 6–20)
BUN/CREAT SERPL: 14.3 (ref 7–25)
CALCIUM SPEC-SCNC: 9.3 MG/DL (ref 8.6–10.5)
CHLORIDE SERPL-SCNC: 103 MMOL/L (ref 98–107)
CHOLEST SERPL-MCNC: 227 MG/DL (ref 0–200)
CO2 SERPL-SCNC: 27.6 MMOL/L (ref 22–29)
CREAT SERPL-MCNC: 0.77 MG/DL (ref 0.57–1)
EGFRCR SERPLBLD CKD-EPI 2021: 94.7 ML/MIN/1.73
GLOBULIN UR ELPH-MCNC: 2.9 GM/DL
GLUCOSE SERPL-MCNC: 104 MG/DL (ref 65–99)
HBA1C MFR BLD: 5.9 % (ref 3.5–5.6)
HDLC SERPL-MCNC: 66 MG/DL (ref 40–60)
LDLC SERPL CALC-MCNC: 144 MG/DL (ref 0–100)
LDLC/HDLC SERPL: 2.15 {RATIO}
POTASSIUM SERPL-SCNC: 3.9 MMOL/L (ref 3.5–5.2)
PROT SERPL-MCNC: 7 G/DL (ref 6–8.5)
SODIUM SERPL-SCNC: 141 MMOL/L (ref 136–145)
TRIGL SERPL-MCNC: 95 MG/DL (ref 0–150)
VLDLC SERPL-MCNC: 17 MG/DL (ref 5–40)

## 2022-08-30 PROCEDURE — 80053 COMPREHEN METABOLIC PANEL: CPT | Performed by: FAMILY MEDICINE

## 2022-08-30 PROCEDURE — 99214 OFFICE O/P EST MOD 30 MIN: CPT | Performed by: FAMILY MEDICINE

## 2022-08-30 PROCEDURE — 36415 COLL VENOUS BLD VENIPUNCTURE: CPT | Performed by: FAMILY MEDICINE

## 2022-08-30 PROCEDURE — 83036 HEMOGLOBIN GLYCOSYLATED A1C: CPT | Performed by: FAMILY MEDICINE

## 2022-08-30 PROCEDURE — 80061 LIPID PANEL: CPT | Performed by: FAMILY MEDICINE

## 2022-08-30 RX ORDER — ATORVASTATIN CALCIUM 20 MG/1
20 TABLET, FILM COATED ORAL NIGHTLY
Qty: 90 TABLET | Refills: 1
Start: 2022-08-30 | End: 2022-12-14

## 2022-08-30 RX ORDER — MELOXICAM 15 MG/1
15 TABLET ORAL DAILY
Qty: 90 TABLET | Refills: 1 | Status: SHIPPED | OUTPATIENT
Start: 2022-08-30 | End: 2022-09-06

## 2022-08-30 RX ORDER — MELOXICAM 15 MG/1
TABLET ORAL
COMMUNITY
Start: 2022-07-27 | End: 2022-08-30 | Stop reason: SDUPTHER

## 2022-08-30 NOTE — PROGRESS NOTES
"Chief Complaint  Shortness of Breath    Subjective        Aisha KHAN Bijal presents to [facility]  Former smoker.  FH of COPD in her mother.     Shortness of Breath  This is a new problem. The current episode started more than 1 month ago. The problem occurs intermittently. The problem has been waxing and waning. Pertinent negatives include no chest pain, fever, headaches, leg swelling, rhinorrhea, sputum production or swollen glands. The symptoms are aggravated by exercise. She has tried nothing for the symptoms.   Hyperlipidemia  This is a chronic problem. The current episode started more than 1 year ago. The problem is uncontrolled. Recent lipid tests were reviewed and are high. Associated symptoms include shortness of breath. Pertinent negatives include no chest pain. Current antihyperlipidemic treatment includes statins.       Objective   Vital Signs:  /86 (BP Location: Right arm, Patient Position: Sitting, Cuff Size: Large Adult)   Pulse 72   Temp 98.4 °F (36.9 °C) (Infrared)   Wt 113 kg (249 lb)   SpO2 96%   BMI 39.00 kg/m²   Estimated body mass index is 39 kg/m² as calculated from the following:    Height as of 7/20/22: 170.2 cm (67\").    Weight as of this encounter: 113 kg (249 lb).          Physical Exam  Vitals and nursing note reviewed.   Constitutional:       General: She is not in acute distress.     Appearance: She is well-developed.   HENT:      Head: Normocephalic.   Eyes:      General: Lids are normal.      Conjunctiva/sclera: Conjunctivae normal.   Neck:      Thyroid: No thyroid mass or thyromegaly.      Trachea: Trachea normal.   Cardiovascular:      Rate and Rhythm: Normal rate and regular rhythm.      Heart sounds: Normal heart sounds.   Pulmonary:      Effort: Pulmonary effort is normal.      Breath sounds: Normal breath sounds.   Musculoskeletal:      Cervical back: Normal range of motion.      Right lower leg: No edema.      Left lower leg: No " edema.   Lymphadenopathy:      Cervical: No cervical adenopathy.   Skin:     General: Skin is warm and dry.   Neurological:      Mental Status: She is alert and oriented to person, place, and time.   Psychiatric:         Attention and Perception: She is attentive.         Mood and Affect: Mood normal.         Speech: Speech normal.         Behavior: Behavior normal.        Result Review :  The following data was reviewed by: Ariane Beaulieu MD on 08/30/2022:  Common labs    Common Labsle 4/5/22 4/5/22 4/5/22 4/5/22    1130 1130 1130 1130   Glucose   101 (A)    BUN   12    Creatinine   0.82    Sodium   137    Potassium   3.9    Chloride   100    Calcium   9.3    Albumin   4.60    Total Bilirubin   0.3    Alkaline Phosphatase   59    AST (SGOT)   20    ALT (SGPT)   18    WBC  5.28     Hemoglobin  13.5     Hematocrit  39.4     Platelets  206     Total Cholesterol    277 (A)   Triglycerides    105   HDL Cholesterol    72 (A)   LDL Cholesterol     187 (A)   Hemoglobin A1C 5.9 (A)      (A) Abnormal value                      Assessment and Plan   Diagnoses and all orders for this visit:    1. Former smoker (Primary)  Assessment & Plan:  She is concerned that she could have COPD or lung cancer.  Check low-dose CT scan as ordered.    Orders:  -     CT Chest Low Dose Wo; Future    2. Shortness of breath    3. Hyperlipidemia, unspecified hyperlipidemia type  Assessment & Plan:  Lipid abnormalities are worsening.  Nutritional counseling was provided. and Pharmacotherapy as ordered.  Lipids will be reassessed in 6 months.    Orders:  -     Lipid Panel  -     Comprehensive Metabolic Panel  -     atorvastatin (LIPITOR) 20 MG tablet; Take 1 tablet by mouth Every Night.  Dispense: 90 tablet; Refill: 1    4. Hyperglycemia  -     Hemoglobin A1c    Other orders  -     meloxicam (MOBIC) 15 MG tablet; Take 1 tablet by mouth Daily.  Dispense: 90 tablet; Refill: 1           Follow Up   No follow-ups on file.  Patient was given  instructions and counseling regarding her condition or for health maintenance advice. Please see specific information pulled into the AVS if appropriate.

## 2022-08-31 ENCOUNTER — TELEPHONE (OUTPATIENT)
Dept: FAMILY MEDICINE CLINIC | Facility: CLINIC | Age: 52
End: 2022-08-31

## 2022-08-31 NOTE — TELEPHONE ENCOUNTER
CALLED [facility] IN MEDICAID TO GET PRE-AUTH    BECAUSE SHE IS 51 YEARS OF AGE AND NOT 52.  EVEN WITH HER DX AND FH OF COPD. SHE DOES NOT MEET CRITERIA AND IT IS NOT COVERED.

## 2022-09-06 ENCOUNTER — HOSPITAL ENCOUNTER (EMERGENCY)
Facility: HOSPITAL | Age: 52
Discharge: HOME | End: 2022-09-06
Attending: EMERGENCY MEDICINE | Admitting: EMERGENCY MEDICINE

## 2022-09-06 ENCOUNTER — APPOINTMENT (OUTPATIENT)
Dept: CT IMAGING | Facility: HOSPITAL | Age: 52
End: 2022-09-06

## 2022-09-06 VITALS
WEIGHT: 251.54 LBS | DIASTOLIC BLOOD PRESSURE: 77 MMHG | HEART RATE: 71 BPM | RESPIRATION RATE: 16 BRPM | SYSTOLIC BLOOD PRESSURE: 131 MMHG | HEIGHT: 67 IN | OXYGEN SATURATION: 99 % | TEMPERATURE: 98.2 F | BODY MASS INDEX: 39.48 KG/M2

## 2022-09-06 DIAGNOSIS — M54.9 ACUTE LEFT-SIDED BACK PAIN, UNSPECIFIED BACK LOCATION: Primary | ICD-10-CM

## 2022-09-06 DIAGNOSIS — S39.012A BACK STRAIN, INITIAL ENCOUNTER: ICD-10-CM

## 2022-09-06 LAB
ALBUMIN SERPL-MCNC: 4 G/DL (ref 3.5–5.2)
ALBUMIN/GLOB SERPL: 1.5 G/DL
ALP SERPL-CCNC: 60 U/L (ref 39–117)
ALT SERPL W P-5'-P-CCNC: 20 U/L (ref 1–33)
ANION GAP SERPL CALCULATED.3IONS-SCNC: 11 MMOL/L (ref 5–15)
AST SERPL-CCNC: 23 U/L (ref 1–32)
BASOPHILS # BLD AUTO: 0 10*3/MM3 (ref 0–0.2)
BASOPHILS NFR BLD AUTO: 0.4 % (ref 0–1.5)
BILIRUB SERPL-MCNC: <0.2 MG/DL (ref 0–1.2)
BILIRUB UR QL STRIP: NEGATIVE
BUN SERPL-MCNC: 13 MG/DL (ref 6–20)
BUN/CREAT SERPL: 12.9 (ref 7–25)
CALCIUM SPEC-SCNC: 9.2 MG/DL (ref 8.6–10.5)
CHLORIDE SERPL-SCNC: 103 MMOL/L (ref 98–107)
CLARITY UR: ABNORMAL
CO2 SERPL-SCNC: 27 MMOL/L (ref 22–29)
COLOR UR: YELLOW
CREAT SERPL-MCNC: 1.01 MG/DL (ref 0.57–1)
DEPRECATED RDW RBC AUTO: 47.7 FL (ref 37–54)
EGFRCR SERPLBLD CKD-EPI 2021: 68.4 ML/MIN/1.73
EOSINOPHIL # BLD AUTO: 0.2 10*3/MM3 (ref 0–0.4)
EOSINOPHIL NFR BLD AUTO: 2.3 % (ref 0.3–6.2)
ERYTHROCYTE [DISTWIDTH] IN BLOOD BY AUTOMATED COUNT: 14.2 % (ref 12.3–15.4)
GLOBULIN UR ELPH-MCNC: 2.7 GM/DL
GLUCOSE SERPL-MCNC: 116 MG/DL (ref 65–99)
GLUCOSE UR STRIP-MCNC: NEGATIVE MG/DL
HCT VFR BLD AUTO: 39.1 % (ref 34–46.6)
HGB BLD-MCNC: 13.1 G/DL (ref 12–15.9)
HGB UR QL STRIP.AUTO: NEGATIVE
HOLD SPECIMEN: NORMAL
KETONES UR QL STRIP: NEGATIVE
LEUKOCYTE ESTERASE UR QL STRIP.AUTO: NEGATIVE
LIPASE SERPL-CCNC: 40 U/L (ref 13–60)
LYMPHOCYTES # BLD AUTO: 1.9 10*3/MM3 (ref 0.7–3.1)
LYMPHOCYTES NFR BLD AUTO: 27.4 % (ref 19.6–45.3)
MCH RBC QN AUTO: 32.3 PG (ref 26.6–33)
MCHC RBC AUTO-ENTMCNC: 33.5 G/DL (ref 31.5–35.7)
MCV RBC AUTO: 96.3 FL (ref 79–97)
MONOCYTES # BLD AUTO: 0.4 10*3/MM3 (ref 0.1–0.9)
MONOCYTES NFR BLD AUTO: 5.5 % (ref 5–12)
NEUTROPHILS NFR BLD AUTO: 4.5 10*3/MM3 (ref 1.7–7)
NEUTROPHILS NFR BLD AUTO: 64.4 % (ref 42.7–76)
NITRITE UR QL STRIP: NEGATIVE
NRBC BLD AUTO-RTO: 0.1 /100 WBC (ref 0–0.2)
PH UR STRIP.AUTO: 7 [PH] (ref 5–8)
PLATELET # BLD AUTO: 213 10*3/MM3 (ref 140–450)
PMV BLD AUTO: 8.5 FL (ref 6–12)
POTASSIUM SERPL-SCNC: 3.8 MMOL/L (ref 3.5–5.2)
PROT SERPL-MCNC: 6.7 G/DL (ref 6–8.5)
PROT UR QL STRIP: NEGATIVE
RBC # BLD AUTO: 4.06 10*6/MM3 (ref 3.77–5.28)
SODIUM SERPL-SCNC: 141 MMOL/L (ref 136–145)
SP GR UR STRIP: 1.02 (ref 1–1.03)
UROBILINOGEN UR QL STRIP: ABNORMAL
WBC NRBC COR # BLD: 7 10*3/MM3 (ref 3.4–10.8)
WHOLE BLOOD HOLD COAG: NORMAL

## 2022-09-06 PROCEDURE — 74176 CT ABD & PELVIS W/O CONTRAST: CPT

## 2022-09-06 PROCEDURE — 99283 EMERGENCY DEPT VISIT LOW MDM: CPT

## 2022-09-06 PROCEDURE — 85025 COMPLETE CBC W/AUTO DIFF WBC: CPT | Performed by: PHYSICIAN ASSISTANT

## 2022-09-06 PROCEDURE — 80053 COMPREHEN METABOLIC PANEL: CPT | Performed by: PHYSICIAN ASSISTANT

## 2022-09-06 PROCEDURE — 83690 ASSAY OF LIPASE: CPT | Performed by: PHYSICIAN ASSISTANT

## 2022-09-06 PROCEDURE — 81003 URINALYSIS AUTO W/O SCOPE: CPT | Performed by: PHYSICIAN ASSISTANT

## 2022-09-06 PROCEDURE — 25010000002 KETOROLAC TROMETHAMINE PER 15 MG: Performed by: PHYSICIAN ASSISTANT

## 2022-09-06 PROCEDURE — 96374 THER/PROPH/DIAG INJ IV PUSH: CPT

## 2022-09-06 RX ORDER — METHOCARBAMOL 750 MG/1
750 TABLET, FILM COATED ORAL 3 TIMES DAILY PRN
Qty: 15 TABLET | Refills: 0 | Status: SHIPPED | OUTPATIENT
Start: 2022-09-06

## 2022-09-06 RX ORDER — KETOROLAC TROMETHAMINE 15 MG/ML
15 INJECTION, SOLUTION INTRAMUSCULAR; INTRAVENOUS ONCE
Status: COMPLETED | OUTPATIENT
Start: 2022-09-06 | End: 2022-09-06

## 2022-09-06 RX ORDER — METHOCARBAMOL 750 MG/1
750 TABLET, FILM COATED ORAL ONCE
Status: COMPLETED | OUTPATIENT
Start: 2022-09-06 | End: 2022-09-06

## 2022-09-06 RX ORDER — SODIUM CHLORIDE 0.9 % (FLUSH) 0.9 %
10 SYRINGE (ML) INJECTION AS NEEDED
Status: DISCONTINUED | OUTPATIENT
Start: 2022-09-06 | End: 2022-09-06 | Stop reason: HOSPADM

## 2022-09-06 RX ORDER — METHYLPREDNISOLONE 4 MG/1
TABLET ORAL
Qty: 21 TABLET | Refills: 0 | Status: SHIPPED | OUTPATIENT
Start: 2022-09-06

## 2022-09-06 RX ORDER — LIDOCAINE 50 MG/G
1 PATCH TOPICAL EVERY 24 HOURS
Qty: 6 EACH | Refills: 0 | Status: SHIPPED | OUTPATIENT
Start: 2022-09-06 | End: 2022-09-15 | Stop reason: SDUPTHER

## 2022-09-06 RX ORDER — NAPROXEN 500 MG/1
500 TABLET ORAL 2 TIMES DAILY WITH MEALS
Qty: 20 TABLET | Refills: 0 | Status: SHIPPED | OUTPATIENT
Start: 2022-09-06 | End: 2023-03-01

## 2022-09-06 RX ADMIN — METHOCARBAMOL 750 MG: 750 TABLET ORAL at 20:16

## 2022-09-06 RX ADMIN — KETOROLAC TROMETHAMINE 15 MG: 15 INJECTION, SOLUTION INTRAMUSCULAR; INTRAVENOUS at 18:11

## 2022-09-06 NOTE — ED PROVIDER NOTES
"Subjective   PIT    Patient is a 51-year-old female presents to the ED with complaints of left-sided flank/back pain that started a few days ago.  Patient initially thought she slept on it wrong because the pain improved but then came back even worse.  She describes as a sharp type pain no significant change with movement currently rates it as moderate in severity.  Patient's tried over-the-counter Tylenol and ibuprofen along with IcyHot patches with minimal relief of her pain.  She states the pain is nonradiating from this area.  She denies any saddle anesthesia or bladder bowel incontinence.  No known injury to the area.  Patient has no urinary complaints.  She does report some nausea without any vomiting or fever.  No chest pain or shortness of breath.  Patient states her stomach feels \"sour\" at times.      History provided by:  Patient      Review of Systems   Constitutional: Negative.    Respiratory: Negative.    Cardiovascular: Negative.    Gastrointestinal: Positive for abdominal pain and nausea. Negative for abdominal distention, blood in stool, constipation, diarrhea and vomiting.   Genitourinary: Positive for flank pain. Negative for decreased urine volume, difficulty urinating, dysuria, frequency, hematuria, pelvic pain, urgency, vaginal bleeding, vaginal discharge and vaginal pain.   Musculoskeletal: Negative for back pain, neck pain and neck stiffness.   Skin: Negative.    Neurological: Negative.    Hematological: Negative.        Past Medical History:   Diagnosis Date   • Hypertension        No Known Allergies    Past Surgical History:   Procedure Laterality Date   • TUBAL ABDOMINAL LIGATION         Family History   Problem Relation Age of Onset   • COPD Mother    • No Known Problems Father        Social History     Socioeconomic History   • Marital status: Single   Tobacco Use   • Smoking status: Former Smoker     Years: 15.00   • Smokeless tobacco: Never Used   • Tobacco comment: quit 13 yrs ago "   Vaping Use   • Vaping Use: Never used   Substance and Sexual Activity   • Alcohol use: Yes     Comment: caffeine 1c qd   • Drug use: Defer   • Sexual activity: Defer           Objective   Physical Exam  Vitals and nursing note reviewed.   Constitutional:       General: She is not in acute distress.     Appearance: Normal appearance. She is well-developed. She is obese. She is not ill-appearing, toxic-appearing or diaphoretic.   HENT:      Head: Normocephalic and atraumatic.      Mouth/Throat:      Mouth: Mucous membranes are moist.      Pharynx: Oropharynx is clear.   Eyes:      General: No scleral icterus.     Extraocular Movements: Extraocular movements intact.      Pupils: Pupils are equal, round, and reactive to light.   Cardiovascular:      Rate and Rhythm: Normal rate and regular rhythm.      Pulses: Normal pulses.      Heart sounds: No murmur heard.    No friction rub. No gallop.   Pulmonary:      Effort: Pulmonary effort is normal. No respiratory distress.      Breath sounds: Normal breath sounds. No stridor. No wheezing, rhonchi or rales.   Chest:      Chest wall: No tenderness.   Abdominal:      General: Bowel sounds are normal. There is no distension. There are no signs of injury.      Palpations: Abdomen is soft.      Tenderness: There is no abdominal tenderness. There is left CVA tenderness. There is no right CVA tenderness, guarding or rebound.      Hernia: No hernia is present.   Musculoskeletal:      Cervical back: Normal, normal range of motion and neck supple. No rigidity.      Comments: Back:  Cervical, thoracic, lumbar spine or midline with no midline tenderness or step-offs.  Spinal musculature soft, tenderness along left flank area as above, no palpable mass spasm, no overlying erythema, no ecchymosis. Range of motion is present   Skin:     General: Skin is warm.      Capillary Refill: Capillary refill takes less than 2 seconds.      Coloration: Skin is not cyanotic, jaundiced or pale.       "Findings: No rash.   Neurological:      General: No focal deficit present.      Mental Status: She is alert and oriented to person, place, and time.   Psychiatric:         Mood and Affect: Mood normal.         Behavior: Behavior normal.         Procedures           ED Course    /77   Pulse 71   Temp 98.2 °F (36.8 °C) (Temporal)   Resp 16   Ht 170.2 cm (67\")   Wt 114 kg (251 lb 8.7 oz)   SpO2 99%   BMI 39.40 kg/m²   Medications   sodium chloride 0.9 % flush 10 mL (has no administration in time range)   ketorolac (TORADOL) injection 15 mg (15 mg Intravenous Given 9/6/22 1811)   methocarbamol (ROBAXIN) tablet 750 mg (750 mg Oral Given 9/6/22 2016)     Labs Reviewed   COMPREHENSIVE METABOLIC PANEL - Abnormal; Notable for the following components:       Result Value    Glucose 116 (*)     Creatinine 1.01 (*)     All other components within normal limits    Narrative:     GFR Normal >60  Chronic Kidney Disease <60  Kidney Failure <15     URINALYSIS W/ CULTURE IF INDICATED - Abnormal; Notable for the following components:    Appearance, UA Cloudy (*)     All other components within normal limits    Narrative:     In absence of clinical symptoms, the presence of pyuria, bacteria, and/or nitrites on the urinalysis result does not correlate with infection.  Urine microscopic not indicated.   LIPASE - Normal   CBC WITH AUTO DIFFERENTIAL - Normal   CBC AND DIFFERENTIAL    Narrative:     The following orders were created for panel order CBC & Differential.  Procedure                               Abnormality         Status                     ---------                               -----------         ------                     CBC Auto Differential[[id number]]        Normal              Final result                 Please view results for these tests on the individual orders.   EXTRA TUBES    Narrative:     The following orders were created for panel order Extra Tubes.  Procedure                               " Abnormality         Status                     ---------                               -----------         ------                     Gold Top - SST[[id number]]                                   Final result               Light Blue Top[[id number]]                                   Final result                 Please view results for these tests on the individual orders.   GOLD TOP - SST   LIGHT BLUE TOP       CT Abdomen Pelvis Without Contrast    Result Date: 9/6/2022  1. No evidence of urolithiasis or hydronephrosis. 2. No evidence of bowel obstruction or inflammation.    Electronically Signed By-Pablo Hernandez MD On:9/6/2022 7:53 PM This report was finalized on [id number] by  Pablo Hernandez MD.                                           MDM  Number of Diagnoses or Management Options  Acute left-sided back pain, unspecified back location  Back strain, initial encounter  Diagnosis management comments: Chart Review:  Comorbidity: As per past medical history  Differentials: Muscle strain, kidney stone, pyelonephritis, UTI, fracture     ;this list is not all inclusive and does not constitute the entirety of considered causes  ECG: Not warranted  Labs: As above  Imaging: Was interpreted by physician and reviewed by myself:  CT Abdomen Pelvis Without Contrast   Final Result    1. No evidence of urolithiasis or hydronephrosis.    2. No evidence of bowel obstruction or inflammation.    Electronically Signed By-Pablo Hernandez MD On:9/6/2022 7:53 PM    This report was finalized on [id number] by  Pablo Hernandez MD.     Disposition/Treatment:  Appropriate PPE was worn during exam and throughout all encounters with the patient.  While in the ED IV was placed and labs were obtained patient was given Toradol for her pain patient was given additional Robaxin upon reassessment she is resting quietly.  Patient presented to the ED with complaints of left-sided mid back pain.  Lab results were fairly unremarkable patient  had normal CBC lipase was normal.  Metabolic panel showed glucose 116 creatinine 1.01 otherwise unremarkable urinalysis showed no signs of UTI no blood noted.  CT of abdomen pelvis showed no obstructing kidney stone bowel obstruction information.  Findings were discussed with the patient at bedside her symptoms could be muscle skeletal in nature her pain is reproducible with palpation.  Patient will be sent home with naproxen, Robaxin, lidocaine patches, and Medrol Dosepak.  Patient voiced understanding of discharge along with signs and symptoms to return to the ED.  Patient was ambulatory with upper steady gait at time of departure.       Amount and/or Complexity of Data Reviewed  Clinical lab tests: reviewed  Tests in the radiology section of CPT®: reviewed        Final diagnoses:   Acute left-sided back pain, unspecified back location   Back strain, initial encounter       ED Disposition  ED Disposition     ED Disposition   Discharge    Condition   Stable    Comment   --             Ariane Beaulieu MD  [address] [address] [phone]    Schedule an appointment as soon as possible for a visit in 3 days      [facility] EMERGENCY DEPARTMENT  [address] [facility] [address] [phone]  Go to   If symptoms worsen         Medication List      New Prescriptions    lidocaine 5 %  Commonly known as: LIDODERM  Place 1 patch on the skin as directed by provider Daily. Remove & Discard patch within 12 hours or as directed by MD     methocarbamol 750 MG tablet  Commonly known as: ROBAXIN  Take 1 tablet by mouth 3 (Three) Times a Day As Needed for Muscle Spasms.     methylPREDNISolone 4 MG dose pack  Commonly known as: MEDROL  Take as directed on package instructions.     naproxen 500 MG tablet  Commonly known as: NAPROSYN  Take 1 tablet by mouth 2 (Two) Times a Day With Meals.        Stop    Diclofenac Sodium 1 % gel gel  Commonly known as: Voltaren           Where to  Get Your Medications      These medications were sent to [facility] [place], [place] - [address] AT [facility] [address] - [phone] PH - [phone] FX  [address], [place] [address]    Phone: [phone]   · lidocaine 5 %  · methocarbamol 750 MG tablet  · methylPREDNISolone 4 MG dose pack  · naproxen 500 MG tablet          Ruthy Iqbal PA  09/06/22 9166

## 2022-09-07 NOTE — DISCHARGE INSTRUCTIONS
Take naproxen and Robaxin as needed for pain.  Use lidocaine patches as needed for pain.  Do not apply heating pad over this patch.  Take Medrol Dosepak as directed.    Follow-up with your primary care provider in 3-5 days.  If you do not have a primary care provider call [phone] for help in finding one, or you may follow up with [facility] at [phone].    Return to ED for any new or worsening symptoms

## 2022-09-15 RX ORDER — LIDOCAINE 50 MG/G
1 PATCH TOPICAL EVERY 24 HOURS
Qty: 15 EACH | Refills: 1 | Status: SHIPPED | OUTPATIENT
Start: 2022-09-15

## 2022-09-15 NOTE — TELEPHONE ENCOUNTER
Caller: Bijal Aisha BILL    Relationship: Self    Best call back number: [phone]    Requested Prescriptions:   Requested Prescriptions     Pending Prescriptions Disp Refills   • lidocaine (LIDODERM) 5 % 6 each 0     Sig: Place 1 patch on the skin as directed by provider Daily. Remove & Discard patch within 12 hours or as directed by MD        Pharmacy where request should be sent: [facility] - [place], [place] - [address] AT [place] [address] - [phone] [facility] [phone] FX     Additional details provided by patient: PATIENT IS OUT OF THIS MEDICATION. PATIENT STATES THESE PATCHES ARE REALLY HELPING HER BACK PAIN.    PATIENT IS REQUESTING AN INCREASE IN THE NUMBER OF PATCHES GIVEN     Does the patient have less than a 3 day supply:  [x] Yes  [] No    Hernandez Walton Rep   09/15/22 10:28 EDT

## 2022-11-28 ENCOUNTER — TELEPHONE (OUTPATIENT)
Dept: FAMILY MEDICINE CLINIC | Facility: CLINIC | Age: 52
End: 2022-11-28

## 2022-11-28 NOTE — TELEPHONE ENCOUNTER
Caller: Bijal Aisha M    Relationship: Self    Best call back number: [phone]    What medication are you requesting: Z-PACK AND ACID REFLUX MEDICATION     What are your current symptoms: BODY ACHES, HEADACHES, FATIGUE    How long have you been experiencing symptoms: THREE DAYS    Have you had these symptoms before:    [x] Yes  [] No    Have you been treated for these symptoms before:   [x] Yes  [] No    If a prescription is needed, what is your preferred pharmacy and phone number: [id number] [place] [address] AT [facility] [address] - [phone]  - [phone] FX     Additional notes: PATIENT WENT TO ER ON Saturday, TESTED NEGATIVE FOR COVID, FLU AND RSV. PLEASE ADVISE IF DR OCHOA CAN SEND IN MEDICATION FOR HER.     HER INSURANCE WILL NOT PAY FOR OMEPRAZOLE BECAUSE SHE CAN GET IT OVER THE COUNTER. PLEASE ADVISE WHAT CAN BE SENT INSTEAD.

## 2022-11-29 RX ORDER — AZITHROMYCIN 250 MG/1
TABLET, FILM COATED ORAL
Qty: 6 TABLET | Refills: 0 | Status: SHIPPED | OUTPATIENT
Start: 2022-11-29 | End: 2023-03-01

## 2022-12-13 DIAGNOSIS — I10 PRIMARY HYPERTENSION: ICD-10-CM

## 2022-12-13 DIAGNOSIS — E78.5 HYPERLIPIDEMIA, UNSPECIFIED HYPERLIPIDEMIA TYPE: ICD-10-CM

## 2022-12-14 RX ORDER — LISINOPRIL AND HYDROCHLOROTHIAZIDE 25; 20 MG/1; MG/1
TABLET ORAL
Qty: 90 TABLET | Refills: 1 | Status: SHIPPED | OUTPATIENT
Start: 2022-12-14

## 2022-12-14 RX ORDER — ATORVASTATIN CALCIUM 20 MG/1
TABLET, FILM COATED ORAL
Qty: 90 TABLET | Refills: 1 | Status: SHIPPED | OUTPATIENT
Start: 2022-12-14

## 2023-01-03 ENCOUNTER — TELEPHONE (OUTPATIENT)
Dept: FAMILY MEDICINE CLINIC | Facility: CLINIC | Age: 53
End: 2023-01-03

## 2023-01-03 DIAGNOSIS — Z12.31 ENCOUNTER FOR SCREENING MAMMOGRAM FOR BREAST CANCER: Primary | ICD-10-CM

## 2023-01-03 NOTE — TELEPHONE ENCOUNTER
Caller: Aisha Appiah    Relationship: Self    Best call back number: [phone]    What orders are you requesting (i.e. lab or imaging): MAMMOGRAM/PATIENT IS NOT SURE WHEN LAST ONE WAS. SHE WOULD LIKE NURSE TO LOOK UP. SHE IS HAVING PAIN IN LEFT BREAST.    In what timeframe would the patient need to come in: ASAP    Where will you receive your lab/imaging services: NA    Additional notes: PLEASE CALL. PATIENT DOESN'T REMEMBER WHEN HER LAST ONE WAS DONE OR WHERE.

## 2023-01-10 ENCOUNTER — TELEPHONE (OUTPATIENT)
Dept: FAMILY MEDICINE CLINIC | Facility: CLINIC | Age: 53
End: 2023-01-10

## 2023-01-10 RX ORDER — BENZONATATE 200 MG/1
200 CAPSULE ORAL 3 TIMES DAILY PRN
Qty: 30 CAPSULE | Refills: 0 | Status: SHIPPED | OUTPATIENT
Start: 2023-01-10 | End: 2023-09-12

## 2023-01-10 NOTE — TELEPHONE ENCOUNTER
Caller: Bijal Aisha M    Relationship: Self    Best call back number: [phone]    What medication are you requesting: COUGH MEDICATION     What are your current symptoms: COUGH     How long have you been experiencing symptoms: 2 DAYS     Have you had these symptoms before:    [x] Yes  [] No    Have you been treated for these symptoms before:   [x] Yes  [] No    If a prescription is needed, what is your preferred pharmacy and phone number: [id number] - [place], [place] - [address] AT [facility] [address] - [phone] [facility] [phone] FX     Additional notes:

## 2023-01-16 ENCOUNTER — HOSPITAL ENCOUNTER (OUTPATIENT)
Dept: MAMMOGRAPHY | Facility: HOSPITAL | Age: 53
Discharge: HOME | End: 2023-01-16
Admitting: FAMILY MEDICINE
Payer: MEDICAID

## 2023-01-16 PROCEDURE — 77067 SCR MAMMO BI INCL CAD: CPT

## 2023-01-16 PROCEDURE — 77063 BREAST TOMOSYNTHESIS BI: CPT

## 2023-02-23 ENCOUNTER — TELEPHONE (OUTPATIENT)
Dept: FAMILY MEDICINE CLINIC | Facility: CLINIC | Age: 53
End: 2023-02-23

## 2023-02-23 DIAGNOSIS — K92.0 HEMATEMESIS OF UNKNOWN CAUSE: Primary | ICD-10-CM

## 2023-02-23 NOTE — TELEPHONE ENCOUNTER
Caller: Aisha Appiah    Relationship: Self    Best call back number: [phone]    What is the medical concern/diagnosis: THROWING UP BLOOD (SEEN AT [facility] FOR THIS)     What specialty or service is being requested: GASTROENTEROLOGY      What is the provider, practice or medical service name: PATIENT STATED THERE WAS A PROVIDER ON [address], BUT DOESN'T REMEMBER NAME.

## 2023-03-01 ENCOUNTER — TELEPHONE (OUTPATIENT)
Dept: FAMILY MEDICINE CLINIC | Facility: CLINIC | Age: 53
End: 2023-03-01

## 2023-03-01 ENCOUNTER — OFFICE VISIT (OUTPATIENT)
Dept: FAMILY MEDICINE CLINIC | Facility: CLINIC | Age: 53
End: 2023-03-01
Payer: MEDICAID

## 2023-03-01 DIAGNOSIS — K92.0 HEMATEMESIS WITH NAUSEA: Primary | ICD-10-CM

## 2023-03-01 DIAGNOSIS — K29.01 ACUTE GASTRITIS WITH HEMORRHAGE, UNSPECIFIED GASTRITIS TYPE: ICD-10-CM

## 2023-03-01 RX ORDER — SUCRALFATE 1 G/1
1 TABLET ORAL 4 TIMES DAILY
Qty: 120 TABLET | Refills: 0 | Status: SHIPPED | OUTPATIENT
Start: 2023-03-01

## 2023-03-01 RX ORDER — PANTOPRAZOLE SODIUM 40 MG/1
40 TABLET, DELAYED RELEASE ORAL DAILY
Qty: 90 TABLET | Refills: 1 | Status: SHIPPED | OUTPATIENT
Start: 2023-03-01

## 2023-03-01 RX ORDER — CELECOXIB 200 MG/1
200 CAPSULE ORAL DAILY
Qty: 90 CAPSULE | Refills: 1 | Status: SHIPPED | OUTPATIENT
Start: 2023-03-01

## 2023-03-01 RX ORDER — MELOXICAM 15 MG/1
TABLET ORAL
COMMUNITY
Start: 2022-12-13 | End: 2023-03-01

## 2023-03-01 NOTE — TELEPHONE ENCOUNTER
Caller: Aisha Appiah    Relationship: Self    Best call back number: [phone]    What is the best time to reach you: ANY    Who are you requesting to speak with (clinical staff, provider,  specific staff member): CLINICAL      What was the call regarding: WOULD LIKE SOMEONE TO RESEND THE REFERRAL TO MARCEL Oviedo MD.  THEY STATE THEY NEVER RECEIVED HER REFERRAL    Do you require a callback: YES

## 2023-03-01 NOTE — PROGRESS NOTES
"Chief Complaint  Hospital Follow Up Visit and Skin Problem (Left side)    Subjective        Aisha Appiah presents to [facility]  History of Present Illness  She was in the ER at [facility] one week ago for hematemesis.   Vomiting   This is a new problem. The current episode started in the past 7 days. The problem occurs 2 to 4 times per day. The problem has been resolved. The emesis has an appearance of bright red blood. Associated symptoms include abdominal pain. Pertinent negatives include no chest pain, chills, coughing, dizziness or fever. She has tried bed rest for the symptoms. The treatment provided moderate relief.       Objective   Vital Signs:  /84   Pulse 73   Temp 98.2 °F (36.8 °C) (Temporal)   Resp 17   Ht 170.2 cm (67.01\")   Wt 116 kg (255 lb 12.8 oz)   SpO2 96%   BMI 40.05 kg/m²   Estimated body mass index is 40.05 kg/m² as calculated from the following:    Height as of this encounter: 170.2 cm (67.01\").    Weight as of this encounter: 116 kg (255 lb 12.8 oz).             Physical Exam  Vitals and nursing note reviewed.   Constitutional:       General: She is not in acute distress.     Appearance: She is well-developed.   HENT:      Head: Normocephalic.   Eyes:      General: Lids are normal.      Conjunctiva/sclera: Conjunctivae normal.   Neck:      Thyroid: No thyroid mass or thyromegaly.      Trachea: Trachea normal.   Cardiovascular:      Rate and Rhythm: Normal rate and regular rhythm.      Heart sounds: Normal heart sounds.   Pulmonary:      Effort: Pulmonary effort is normal.      Breath sounds: Normal breath sounds.   Abdominal:      Palpations: Abdomen is soft.   Musculoskeletal:      Cervical back: Normal range of motion.   Lymphadenopathy:      Cervical: No cervical adenopathy.   Skin:     General: Skin is warm and dry.   Neurological:      Mental Status: She is alert and oriented to person, place, and time.   Psychiatric:         Attention and " Perception: She is attentive.         Mood and Affect: Mood normal.         Speech: Speech normal.         Behavior: Behavior normal.        Result Review :  The following data was reviewed by: Ariane Beaulieu MD on 03/01/2023:  Common labs    Common Labs 4/5/22 4/5/22 4/5/22 4/5/22 8/30/22 8/30/22 8/30/22 9/6/22 9/6/22    1130 1130 1130 1130 0940 0940 0940 1801 1801   Glucose   101 (A)   104 (A)   116 (A)   BUN   12   11   13   Creatinine   0.82   0.77   1.01 (A)   Sodium   137   141   141   Potassium   3.9   3.9   3.8   Chloride   100   103   103   Calcium   9.3   9.3   9.2   Albumin   4.60   4.10   4.00   Total Bilirubin   0.3   0.3   <0.2   Alkaline Phosphatase   59   56   60   AST (SGOT)   20   20   23   ALT (SGPT)   18   14   20   WBC  5.28      7.00    Hemoglobin  13.5      13.1    Hematocrit  39.4      39.1    Platelets  206      213    Total Cholesterol    277 (A)   227 (A)     Triglycerides    105   95     HDL Cholesterol    72 (A)   66 (A)     LDL Cholesterol     187 (A)   144 (A)     Hemoglobin A1C 5.9 (A)    5.9 (A)       (A) Abnormal value       Comments are available for some flowsheets but are not being displayed.                        Assessment and Plan   Diagnoses and all orders for this visit:    1. Hematemesis with nausea (Primary)    2. Acute gastritis with hemorrhage, unspecified gastritis type    Other orders  -     pantoprazole (PROTONIX) 40 MG EC tablet; Take 1 tablet by mouth Daily.  Dispense: 90 tablet; Refill: 1  -     sucralfate (Carafate) 1 g tablet; Take 1 tablet by mouth 4 (Four) Times a Day.  Dispense: 120 tablet; Refill: 0  -     celecoxib (CeleBREX) 200 MG capsule; Take 1 capsule by mouth Daily.  Dispense: 90 capsule; Refill: 1             Follow Up   No follow-ups on file.  Patient was given instructions and counseling regarding her condition or for health maintenance advice. Please see specific information pulled into the AVS if appropriate.

## 2023-03-25 VITALS
HEART RATE: 73 BPM | BODY MASS INDEX: 40.15 KG/M2 | RESPIRATION RATE: 17 BRPM | DIASTOLIC BLOOD PRESSURE: 84 MMHG | SYSTOLIC BLOOD PRESSURE: 134 MMHG | WEIGHT: 255.8 LBS | TEMPERATURE: 98.2 F | OXYGEN SATURATION: 96 % | HEIGHT: 67 IN

## 2023-05-08 ENCOUNTER — LAB REQUISITION (OUTPATIENT)
Dept: LAB | Facility: HOSPITAL | Age: 53
End: 2023-05-08
Payer: MEDICAID

## 2023-05-08 DIAGNOSIS — R13.10 DYSPHAGIA, UNSPECIFIED: ICD-10-CM

## 2023-05-08 PROCEDURE — 88305 TISSUE EXAM BY PATHOLOGIST: CPT | Performed by: INTERNAL MEDICINE

## 2023-05-09 LAB
LAB AP CASE REPORT: NORMAL
PATH REPORT.FINAL DX SPEC: NORMAL
PATH REPORT.GROSS SPEC: NORMAL

## 2023-05-15 ENCOUNTER — TELEPHONE (OUTPATIENT)
Dept: PODIATRY | Facility: CLINIC | Age: 53
End: 2023-05-15
Payer: MEDICAID

## 2023-05-15 NOTE — TELEPHONE ENCOUNTER
Provider: DR. BETANCOURT  Caller: LO  Relationship to Patient: SELF  Phone Number: [phone]  Reason for Call: PATIENT WANTED TO KNOW IF SHE CAN JUST COME IN AND PURCHASE THE SHOE INSERTS IN THE OFFICE. PLEASE CALL TO ADVISE.

## 2023-05-16 ENCOUNTER — TELEPHONE (OUTPATIENT)
Dept: FAMILY MEDICINE CLINIC | Facility: CLINIC | Age: 53
End: 2023-05-16

## 2023-05-16 DIAGNOSIS — M25.561 CHRONIC PAIN OF BOTH KNEES: Primary | ICD-10-CM

## 2023-05-16 DIAGNOSIS — M25.562 CHRONIC PAIN OF BOTH KNEES: Primary | ICD-10-CM

## 2023-05-16 DIAGNOSIS — G89.29 CHRONIC PAIN OF BOTH KNEES: Primary | ICD-10-CM

## 2023-05-16 NOTE — TELEPHONE ENCOUNTER
Caller: Patient    Reason: is needing a Referral for    Orthodontics. Patient is having gel put in     both knees. The office fax number is     [phone] . The office number     [phone]. Patient has an appointment on Thursday May 18 at 10:00 am.                                      Thank you

## 2023-07-24 ENCOUNTER — OFFICE VISIT (OUTPATIENT)
Dept: FAMILY MEDICINE CLINIC | Facility: CLINIC | Age: 53
End: 2023-07-24
Payer: MEDICAID

## 2023-07-24 VITALS
HEIGHT: 67 IN | HEART RATE: 85 BPM | OXYGEN SATURATION: 94 % | SYSTOLIC BLOOD PRESSURE: 122 MMHG | TEMPERATURE: 97.7 F | DIASTOLIC BLOOD PRESSURE: 83 MMHG | WEIGHT: 250.6 LBS | BODY MASS INDEX: 39.33 KG/M2

## 2023-07-24 DIAGNOSIS — H66.002 NON-RECURRENT ACUTE SUPPURATIVE OTITIS MEDIA OF LEFT EAR WITHOUT SPONTANEOUS RUPTURE OF TYMPANIC MEMBRANE: Primary | ICD-10-CM

## 2023-07-24 DIAGNOSIS — J30.1 ALLERGIC RHINITIS DUE TO POLLEN, UNSPECIFIED SEASONALITY: ICD-10-CM

## 2023-07-24 DIAGNOSIS — I10 PRIMARY HYPERTENSION: ICD-10-CM

## 2023-07-24 DIAGNOSIS — E78.5 HYPERLIPIDEMIA, UNSPECIFIED HYPERLIPIDEMIA TYPE: ICD-10-CM

## 2023-07-24 PROCEDURE — 1160F RVW MEDS BY RX/DR IN RCRD: CPT | Performed by: FAMILY MEDICINE

## 2023-07-24 PROCEDURE — 3074F SYST BP LT 130 MM HG: CPT | Performed by: FAMILY MEDICINE

## 2023-07-24 PROCEDURE — 3079F DIAST BP 80-89 MM HG: CPT | Performed by: FAMILY MEDICINE

## 2023-07-24 PROCEDURE — 99214 OFFICE O/P EST MOD 30 MIN: CPT | Performed by: FAMILY MEDICINE

## 2023-07-24 PROCEDURE — 1159F MED LIST DOCD IN RCRD: CPT | Performed by: FAMILY MEDICINE

## 2023-07-24 RX ORDER — PANTOPRAZOLE SODIUM 40 MG/1
40 TABLET, DELAYED RELEASE ORAL DAILY
Qty: 90 TABLET | Refills: 1 | Status: SHIPPED | OUTPATIENT
Start: 2023-07-24

## 2023-07-24 RX ORDER — MELOXICAM 15 MG/1
15 TABLET ORAL DAILY
COMMUNITY
Start: 2023-04-24

## 2023-07-24 RX ORDER — LISINOPRIL AND HYDROCHLOROTHIAZIDE 25; 20 MG/1; MG/1
1 TABLET ORAL DAILY
Qty: 90 TABLET | Refills: 1 | Status: SHIPPED | OUTPATIENT
Start: 2023-07-24

## 2023-07-24 RX ORDER — AMOXICILLIN 500 MG/1
500 CAPSULE ORAL 3 TIMES DAILY
Qty: 30 CAPSULE | Refills: 0 | Status: SHIPPED | OUTPATIENT
Start: 2023-07-24

## 2023-07-24 RX ORDER — ATORVASTATIN CALCIUM 20 MG/1
20 TABLET, FILM COATED ORAL NIGHTLY
Qty: 90 TABLET | Refills: 1 | Status: SHIPPED | OUTPATIENT
Start: 2023-07-24

## 2023-07-24 RX ORDER — BACLOFEN 10 MG/1
10 TABLET ORAL 3 TIMES DAILY
Qty: 30 TABLET | Refills: 0 | Status: SHIPPED | OUTPATIENT
Start: 2023-07-24

## 2023-07-24 RX ORDER — CETIRIZINE HYDROCHLORIDE 10 MG/1
10 TABLET ORAL DAILY
Qty: 90 TABLET | Refills: 1 | Status: SHIPPED | OUTPATIENT
Start: 2023-07-24

## 2023-07-24 RX ORDER — LIDOCAINE 50 MG/G
1 PATCH TOPICAL EVERY 24 HOURS
Qty: 15 EACH | Refills: 1 | Status: SHIPPED | OUTPATIENT
Start: 2023-07-24

## 2023-07-24 NOTE — PROGRESS NOTES
"Chief Complaint  Earache (LT- at night- fullness x 1 week )    Subjective        Aisha Appiah presents to [facility]  Earache   There is pain in the left ear. This is a new problem. The current episode started in the past 7 days. The problem occurs constantly. The problem has been unchanged. There has been no fever. The pain is mild. Pertinent negatives include no coughing, ear discharge, headaches, hearing loss, rhinorrhea or sore throat. She has tried acetaminophen for the symptoms. The treatment provided mild relief.   Hypertension  This is a chronic problem. The current episode started more than 1 year ago. The problem has been gradually improving since onset. The problem is controlled. Pertinent negatives include no anxiety, blurred vision, chest pain, headaches, peripheral edema or shortness of breath. Current antihypertension treatment includes diuretics and ACE inhibitors. The current treatment provides moderate improvement. There are no compliance problems.    Hyperlipidemia  This is a chronic problem. The current episode started more than 1 year ago. Exacerbating diseases include obesity. Pertinent negatives include no chest pain or shortness of breath. Current antihyperlipidemic treatment includes statins. The current treatment provides moderate improvement of lipids. There are no compliance problems.      Objective   Vital Signs:  /83 (BP Location: Left arm, Patient Position: Sitting, Cuff Size: Large Adult)   Pulse 85   Temp 97.7 øF (36.5 øC) (Infrared)   Ht 170.2 cm (67.01\")   Wt 114 kg (250 lb 9.6 oz)   SpO2 94%   BMI 39.24 kg/mý   Estimated body mass index is 39.24 kg/mý as calculated from the following:    Height as of this encounter: 170.2 cm (67.01\").    Weight as of this encounter: 114 kg (250 lb 9.6 oz).       Class 2 Severe Obesity (BMI >=35 and <=39.9). Obesity-related health conditions include the following: hypertension and dyslipidemias. " Obesity is unchanged. BMI is is above average; BMI management plan is completed. We discussed portion control and increasing exercise.      Physical Exam   Result Review :  The following data was reviewed by: Ariane Beaulieu MD on 07/24/2023:  Common labs          8/30/2022    09:40 9/6/2022    18:01   Common Labs   Glucose 104  116    BUN 11  13    Creatinine 0.77  1.01    Sodium 141  141    Potassium 3.9  3.8    Chloride 103  103    Calcium 9.3  9.2    Albumin 4.10  4.00    Total Bilirubin 0.3  <0.2    Alkaline Phosphatase 56  60    AST (SGOT) 20  23    ALT (SGPT) 14  20    WBC  7.00    Hemoglobin  13.1    Hematocrit  39.1    Platelets  213    Total Cholesterol 227     Triglycerides 95     HDL Cholesterol 66     LDL Cholesterol  144     Hemoglobin A1C 5.9                    Assessment and Plan   Diagnoses and all orders for this visit:    1. Non-recurrent acute suppurative otitis media of left ear without spontaneous rupture of tympanic membrane (Primary)  -     amoxicillin (AMOXIL) 500 MG capsule; Take 1 capsule by mouth 3 (Three) Times a Day.  Dispense: 30 capsule; Refill: 0    2. Primary hypertension  -     lisinopril-hydrochlorothiazide (PRINZIDE,ZESTORETIC) 20-25 MG per tablet; Take 1 tablet by mouth Daily.  Dispense: 90 tablet; Refill: 1    3. Hyperlipidemia, unspecified hyperlipidemia type  -     atorvastatin (LIPITOR) 20 MG tablet; Take 1 tablet by mouth Every Night.  Dispense: 90 tablet; Refill: 1    4. Allergic rhinitis due to pollen, unspecified seasonality  -     cetirizine (zyrTEC) 10 MG tablet; Take 1 tablet by mouth Daily.  Dispense: 90 tablet; Refill: 1    Other orders  -     pantoprazole (PROTONIX) 40 MG EC tablet; Take 1 tablet by mouth Daily.  Dispense: 90 tablet; Refill: 1  -     baclofen (LIORESAL) 10 MG tablet; Take 1 tablet by mouth 3 (Three) Times a Day.  Dispense: 30 tablet; Refill: 0  -     lidocaine (LIDODERM) 5 %; Place 1 patch on the skin as directed by provider Daily. Remove &  Discard patch within 12 hours or as directed by MD  Dispense: 15 each; Refill: 1             Follow Up   No follow-ups on file.  Patient was given instructions and counseling regarding her condition or for health maintenance advice. Please see specific information pulled into the AVS if appropriate.

## 2023-08-11 ENCOUNTER — TELEPHONE (OUTPATIENT)
Dept: FAMILY MEDICINE CLINIC | Facility: CLINIC | Age: 53
End: 2023-08-11

## 2023-08-11 DIAGNOSIS — H66.002 NON-RECURRENT ACUTE SUPPURATIVE OTITIS MEDIA OF LEFT EAR WITHOUT SPONTANEOUS RUPTURE OF TYMPANIC MEMBRANE: Primary | ICD-10-CM

## 2023-08-11 RX ORDER — DOXYCYCLINE 100 MG/1
100 CAPSULE ORAL 2 TIMES DAILY
Qty: 20 CAPSULE | Refills: 0 | Status: SHIPPED | OUTPATIENT
Start: 2023-08-11 | End: 2023-09-12

## 2023-08-11 NOTE — TELEPHONE ENCOUNTER
Caller: Aisha Appiah    Relationship: Self    Best call back number: [phone]     What is the best time to reach you: ANY    Who are you requesting to speak with (clinical staff, provider,  specific staff member): PCP    Do you know the name of the person who called:     What was the call regarding: PATIENT WAS SEEN ON 7/24/23. SHE WAS GIVEN MEDICATION ON THAT DAY. PATIENT STATES THAT SHE HAS 1 DAY OF MEDICATION REMAINING. SHE IS STILL HAVING THE SAME SYMPTOMS/RINGING IN HER LEFT EAR.    Is it okay if the provider responds through MyChart:

## 2023-08-11 NOTE — TELEPHONE ENCOUNTER
I will try her on a stronger antibiotic but if she does not improve she may need to see an ENT specialist.

## 2023-08-28 ENCOUNTER — TELEPHONE (OUTPATIENT)
Dept: FAMILY MEDICINE CLINIC | Facility: CLINIC | Age: 53
End: 2023-08-28

## 2023-08-28 RX ORDER — METHYLPREDNISOLONE 4 MG/1
TABLET ORAL
Qty: 1 EACH | Refills: 0 | Status: SHIPPED | OUTPATIENT
Start: 2023-08-28 | End: 2023-09-12

## 2023-08-28 NOTE — TELEPHONE ENCOUNTER
Caller: Aisha Appiah    Relationship: Self    Best call back number: [phone]     What is the medical concern/diagnosis: HEARTBEAT IN LEFT EAR    What specialty or service is being requested: ENT    What is the provider, practice or medical service name: ANY    Any additional details: PATIENT STATES THE ANTIBIOTICS ARE NOT HELPING. PATIENT IS STILL EXPERIENCING A HEARTBEAT IN HER EAR.

## 2023-08-30 ENCOUNTER — TELEPHONE (OUTPATIENT)
Dept: FAMILY MEDICINE CLINIC | Facility: CLINIC | Age: 53
End: 2023-08-30

## 2023-08-30 NOTE — TELEPHONE ENCOUNTER
Caller: Aisha Appiah    Relationship: Self    Best call back number: [phone]     What is the medical concern/diagnosis: KNEE PAIN IN BOTH KNEES    What specialty or service is being requested: ORTHOPEDIC SURGERY    What is the provider, practice or medical service name: DR BLANCO TIERNEY    What is the office location: [address] [place] [address], [address]  [place] [address]    What is the office phone number: [phone]    Any additional details: PATIENT HAS BEEN SEEING THIS PROVIDER FOR A FEW MONTHS BUT SHE WAS NOT ABLE TO BE SEEN FOR HER APPOINTMENT YESTERDAY BECAUSE THEY NEED A REFERRAL FROM DR. OCHOA FOR INSURANCE    PLEASE ADVISE

## 2023-09-12 ENCOUNTER — LAB (OUTPATIENT)
Dept: FAMILY MEDICINE CLINIC | Facility: CLINIC | Age: 53
End: 2023-09-12
Payer: MEDICAID

## 2023-09-12 ENCOUNTER — TELEPHONE (OUTPATIENT)
Dept: FAMILY MEDICINE CLINIC | Facility: CLINIC | Age: 53
End: 2023-09-12

## 2023-09-12 ENCOUNTER — OFFICE VISIT (OUTPATIENT)
Dept: FAMILY MEDICINE CLINIC | Facility: CLINIC | Age: 53
End: 2023-09-12
Payer: MEDICAID

## 2023-09-12 VITALS
SYSTOLIC BLOOD PRESSURE: 138 MMHG | OXYGEN SATURATION: 95 % | BODY MASS INDEX: 40.12 KG/M2 | WEIGHT: 255.6 LBS | TEMPERATURE: 98 F | HEART RATE: 77 BPM | DIASTOLIC BLOOD PRESSURE: 85 MMHG | HEIGHT: 67 IN

## 2023-09-12 DIAGNOSIS — G89.29 CHRONIC PAIN OF BOTH KNEES: Primary | ICD-10-CM

## 2023-09-12 DIAGNOSIS — I10 PRIMARY HYPERTENSION: ICD-10-CM

## 2023-09-12 DIAGNOSIS — M25.561 CHRONIC PAIN OF BOTH KNEES: ICD-10-CM

## 2023-09-12 DIAGNOSIS — M25.561 CHRONIC PAIN OF BOTH KNEES: Primary | ICD-10-CM

## 2023-09-12 DIAGNOSIS — M25.562 CHRONIC PAIN OF BOTH KNEES: Primary | ICD-10-CM

## 2023-09-12 DIAGNOSIS — M54.50 MIDLINE LOW BACK PAIN WITHOUT SCIATICA, UNSPECIFIED CHRONICITY: ICD-10-CM

## 2023-09-12 DIAGNOSIS — L91.8 SKIN TAG: ICD-10-CM

## 2023-09-12 DIAGNOSIS — L29.9 PRURITUS: Primary | ICD-10-CM

## 2023-09-12 DIAGNOSIS — E78.5 HYPERLIPIDEMIA, UNSPECIFIED HYPERLIPIDEMIA TYPE: ICD-10-CM

## 2023-09-12 DIAGNOSIS — G89.29 CHRONIC PAIN OF BOTH KNEES: ICD-10-CM

## 2023-09-12 DIAGNOSIS — M25.562 CHRONIC PAIN OF BOTH KNEES: ICD-10-CM

## 2023-09-12 PROCEDURE — 99213 OFFICE O/P EST LOW 20 MIN: CPT | Performed by: FAMILY MEDICINE

## 2023-09-12 PROCEDURE — 85025 COMPLETE CBC W/AUTO DIFF WBC: CPT | Performed by: FAMILY MEDICINE

## 2023-09-12 PROCEDURE — 3079F DIAST BP 80-89 MM HG: CPT | Performed by: FAMILY MEDICINE

## 2023-09-12 PROCEDURE — 83036 HEMOGLOBIN GLYCOSYLATED A1C: CPT | Performed by: FAMILY MEDICINE

## 2023-09-12 PROCEDURE — 3075F SYST BP GE 130 - 139MM HG: CPT | Performed by: FAMILY MEDICINE

## 2023-09-12 PROCEDURE — 36415 COLL VENOUS BLD VENIPUNCTURE: CPT | Performed by: FAMILY MEDICINE

## 2023-09-12 PROCEDURE — 80053 COMPREHEN METABOLIC PANEL: CPT | Performed by: FAMILY MEDICINE

## 2023-09-12 PROCEDURE — 88305 TISSUE EXAM BY PATHOLOGIST: CPT | Performed by: FAMILY MEDICINE

## 2023-09-12 PROCEDURE — 1159F MED LIST DOCD IN RCRD: CPT | Performed by: FAMILY MEDICINE

## 2023-09-12 PROCEDURE — 1160F RVW MEDS BY RX/DR IN RCRD: CPT | Performed by: FAMILY MEDICINE

## 2023-09-12 PROCEDURE — 80061 LIPID PANEL: CPT | Performed by: FAMILY MEDICINE

## 2023-09-12 NOTE — TELEPHONE ENCOUNTER
I thought [facility] would take care of the order for PT at their office but I can put in orders for it. .

## 2023-09-12 NOTE — TELEPHONE ENCOUNTER
THE ORTHO REFERRAL HAS BEEN FAXED TO [facility] AT THE FAX BELOW. I DO NOT SEE AN ORDER FOR PT, IS THIS PATIENT SUPPOSED TO HAVE A REFERRAL FOR PT?

## 2023-09-12 NOTE — TELEPHONE ENCOUNTER
PT STOPPED BY AND GAVE US THE FAX # FOR HER REFERRAL FOR PT ([phone]) AND ([phone]) FOR ORTHO.

## 2023-09-12 NOTE — PROGRESS NOTES
"Chief Complaint  Itching (Of both feet and hands- x 2 weeks - tried otc creams with no help)    Tyesha KHAN Bijal presents to [facility]  History of Present Illness  She has been going to see an orthopedic surgeon and says that she needs a new referral for that office for orthopedic visit and possible PT for her knees and back    Itching  This is a new problem. The current episode started in the past 7 days. The problem occurs intermittently. The problem has been waxing and waning. Pertinent negatives include no rash or swollen glands. Nothing aggravates the symptoms. Treatments tried: otc anifungal cream, moisturizer.     Objective   Vital Signs:  /85 (BP Location: Left arm, Patient Position: Sitting, Cuff Size: Large Adult)   Pulse 77   Temp 98 °F (36.7 °C) (Infrared)   Ht 170.2 cm (67.01\")   Wt 116 kg (255 lb 9.6 oz)   SpO2 95%   BMI 40.02 kg/m²   Estimated body mass index is 40.02 kg/m² as calculated from the following:    Height as of this encounter: 170.2 cm (67.01\").    Weight as of this encounter: 116 kg (255 lb 9.6 oz).     Class 3 Severe Obesity (BMI >=40). Obesity-related health conditions include the following: hypertension, dyslipidemias, and GERD. Obesity is unchanged. BMI is is above average; BMI management plan is completed. We discussed portion control and increasing exercise.           Physical Exam  Vitals and nursing note reviewed.   Constitutional:       General: She is not in acute distress.     Appearance: She is well-developed.   HENT:      Head: Normocephalic.   Eyes:      General: Lids are normal.      Conjunctiva/sclera: Conjunctivae normal.   Neck:      Thyroid: No thyroid mass or thyromegaly.      Trachea: Trachea normal.   Cardiovascular:      Rate and Rhythm: Normal rate and regular rhythm.      Heart sounds: Normal heart sounds.   Pulmonary:      Effort: Pulmonary effort is normal.      Breath sounds: Normal breath sounds. "   Musculoskeletal:      Cervical back: Normal range of motion.   Lymphadenopathy:      Cervical: No cervical adenopathy.   Skin:     General: Skin is warm and dry.      Coloration: Skin is not pale.      Findings: No erythema or rash.   Neurological:      Mental Status: She is alert and oriented to person, place, and time.   Psychiatric:         Attention and Perception: She is attentive.         Mood and Affect: Mood normal.         Speech: Speech normal.         Behavior: Behavior normal.      Result Review :  The following data was reviewed by: Ariane Beaulieu MD on 09/12/2023:          Skin Tag Removal    Date/Time: 9/12/2023 1:03 PM  Performed by: Ariane Beaulieu MD  Authorized by: Ariane Beaulieu MD   Preparation: Patient was prepped and draped in the usual sterile fashion.  Local anesthesia used: yes  Anesthesia: local infiltration    Anesthesia:  Local anesthesia used: yes  Local Anesthetic: lidocaine 1% without epinephrine  Anesthetic total: 0.5 mL    Sedation:  Patient sedated: no    Patient tolerance: patient tolerated the procedure well with no immediate complications  Comments: Left axilla          Assessment and Plan   Diagnoses and all orders for this visit:    1. Pruritus (Primary)  Assessment & Plan:  OTC hydrocortisone cream and Zyrtec po qd prn      2. Chronic pain of both knees  -     Ambulatory Referral to Orthopedic Surgery    3. Midline low back pain without sciatica, unspecified chronicity  -     Ambulatory Referral to Orthopedic Surgery    4. Primary hypertension  -     CBC & Differential  -     Comprehensive Metabolic Panel  -     Hemoglobin A1c  -     Lipid Panel    5. Hyperlipidemia, unspecified hyperlipidemia type  -     CBC & Differential  -     Comprehensive Metabolic Panel  -     Hemoglobin A1c  -     Lipid Panel    6. Skin tag    Other orders  -     Skin Tag Removal             Follow Up   No follow-ups on file.  Patient was given instructions and counseling regarding her  condition or for health maintenance advice. Please see specific information pulled into the AVS if appropriate.

## 2023-09-13 LAB
ALBUMIN SERPL-MCNC: 4 G/DL (ref 3.5–5.2)
ALBUMIN/GLOB SERPL: 1.4 G/DL
ALP SERPL-CCNC: 58 U/L (ref 39–117)
ALT SERPL W P-5'-P-CCNC: 20 U/L (ref 1–33)
ANION GAP SERPL CALCULATED.3IONS-SCNC: 8.5 MMOL/L (ref 5–15)
AST SERPL-CCNC: 26 U/L (ref 1–32)
BASOPHILS # BLD AUTO: 0.04 10*3/MM3 (ref 0–0.2)
BASOPHILS NFR BLD AUTO: 0.5 % (ref 0–1.5)
BILIRUB SERPL-MCNC: 0.3 MG/DL (ref 0–1.2)
BUN SERPL-MCNC: 13 MG/DL (ref 6–20)
BUN/CREAT SERPL: 13.4 (ref 7–25)
CALCIUM SPEC-SCNC: 9.1 MG/DL (ref 8.6–10.5)
CHLORIDE SERPL-SCNC: 101 MMOL/L (ref 98–107)
CHOLEST SERPL-MCNC: 229 MG/DL (ref 0–200)
CO2 SERPL-SCNC: 30.5 MMOL/L (ref 22–29)
CREAT SERPL-MCNC: 0.97 MG/DL (ref 0.57–1)
DEPRECATED RDW RBC AUTO: 47 FL (ref 37–54)
EGFRCR SERPLBLD CKD-EPI 2021: 71.3 ML/MIN/1.73
EOSINOPHIL # BLD AUTO: 0.19 10*3/MM3 (ref 0–0.4)
EOSINOPHIL NFR BLD AUTO: 2.2 % (ref 0.3–6.2)
ERYTHROCYTE [DISTWIDTH] IN BLOOD BY AUTOMATED COUNT: 13.4 % (ref 12.3–15.4)
GLOBULIN UR ELPH-MCNC: 2.9 GM/DL
GLUCOSE SERPL-MCNC: 94 MG/DL (ref 65–99)
HBA1C MFR BLD: 6.3 % (ref 4.8–5.6)
HCT VFR BLD AUTO: 39.5 % (ref 34–46.6)
HDLC SERPL-MCNC: 92 MG/DL (ref 40–60)
HGB BLD-MCNC: 13.4 G/DL (ref 12–15.9)
IMM GRANULOCYTES # BLD AUTO: 0.05 10*3/MM3 (ref 0–0.05)
IMM GRANULOCYTES NFR BLD AUTO: 0.6 % (ref 0–0.5)
LDLC SERPL CALC-MCNC: 104 MG/DL (ref 0–100)
LDLC/HDLC SERPL: 1.06 {RATIO}
LYMPHOCYTES # BLD AUTO: 3.6 10*3/MM3 (ref 0.7–3.1)
LYMPHOCYTES NFR BLD AUTO: 41.6 % (ref 19.6–45.3)
MCH RBC QN AUTO: 32.8 PG (ref 26.6–33)
MCHC RBC AUTO-ENTMCNC: 33.9 G/DL (ref 31.5–35.7)
MCV RBC AUTO: 96.8 FL (ref 79–97)
MONOCYTES # BLD AUTO: 0.42 10*3/MM3 (ref 0.1–0.9)
MONOCYTES NFR BLD AUTO: 4.8 % (ref 5–12)
NEUTROPHILS NFR BLD AUTO: 4.36 10*3/MM3 (ref 1.7–7)
NEUTROPHILS NFR BLD AUTO: 50.3 % (ref 42.7–76)
NRBC BLD AUTO-RTO: 0 /100 WBC (ref 0–0.2)
PLATELET # BLD AUTO: 231 10*3/MM3 (ref 140–450)
PMV BLD AUTO: 10.9 FL (ref 6–12)
POTASSIUM SERPL-SCNC: 3.1 MMOL/L (ref 3.5–5.2)
PROT SERPL-MCNC: 6.9 G/DL (ref 6–8.5)
RBC # BLD AUTO: 4.08 10*6/MM3 (ref 3.77–5.28)
SODIUM SERPL-SCNC: 140 MMOL/L (ref 136–145)
TRIGL SERPL-MCNC: 196 MG/DL (ref 0–150)
VLDLC SERPL-MCNC: 33 MG/DL (ref 5–40)
WBC NRBC COR # BLD: 8.66 10*3/MM3 (ref 3.4–10.8)

## 2023-09-13 RX ORDER — POTASSIUM CHLORIDE 750 MG/1
10 TABLET, EXTENDED RELEASE ORAL DAILY
Qty: 10 TABLET | Refills: 0 | Status: SHIPPED | OUTPATIENT
Start: 2023-09-13

## 2023-09-13 NOTE — TELEPHONE ENCOUNTER
RELAY:    RECEIVED FAX FROM [facility]. THEY CANNOT SEE THE PATIENT FOR PT WITH HER INSURANCE. FAXED TO [facility], THEY TAKE MEDICAID.

## 2023-09-14 LAB
LAB AP CASE REPORT: NORMAL
PATH REPORT.FINAL DX SPEC: NORMAL
PATH REPORT.GROSS SPEC: NORMAL

## 2023-09-19 ENCOUNTER — TELEPHONE (OUTPATIENT)
Dept: FAMILY MEDICINE CLINIC | Facility: CLINIC | Age: 53
End: 2023-09-19

## 2023-09-19 RX ORDER — TOBRAMYCIN 3 MG/ML
1 SOLUTION/ DROPS OPHTHALMIC
Qty: 5 ML | Refills: 0 | Status: SHIPPED | OUTPATIENT
Start: 2023-09-19 | End: 2023-10-27

## 2023-09-19 NOTE — TELEPHONE ENCOUNTER
Caller: Aisha Appiah    Relationship: Self    Best call back number: [phone]    What medication are you requesting:     What are your current symptoms: LEFT EYE ITCHING, BURNING, AND PUFFY    How long have you been experiencing symptoms: 2 DAYS    If a prescription is needed, what is your preferred pharmacy and phone number: [facility] [id number] - [place], [place] - [address] AT [facility] [address] - [phone] [facility] [phone] FX     Additional notes: PATIENT STATED HER RIGHT EYE IS ALSO STARTING TO ITCH.    PLEASE CALL.

## 2023-10-19 ENCOUNTER — TELEPHONE (OUTPATIENT)
Dept: FAMILY MEDICINE CLINIC | Facility: CLINIC | Age: 53
End: 2023-10-19

## 2023-10-19 ENCOUNTER — TELEPHONE (OUTPATIENT)
Dept: FAMILY MEDICINE CLINIC | Facility: CLINIC | Age: 53
End: 2023-10-19
Payer: MEDICAID

## 2023-10-19 DIAGNOSIS — L29.9 PRURITUS: Primary | ICD-10-CM

## 2023-10-19 NOTE — TELEPHONE ENCOUNTER
Caller: Aisha Appiah    Relationship: Self    Best call back number: [phone]     What medication are you requesting: DIFLUCAN    What are your current symptoms: YEAST INFECTION BECAUSE OF ANTIBIOTICS    How long have you been experiencing symptoms:     Have you had these symptoms before:    [] Yes  [] No    Have you been treated for these symptoms before:   [] Yes  [] No    If a prescription is needed, what is your preferred pharmacy and phone number: [facility] [id number] - [place], [place] - [address] AT [facility] [address] - [phone]  - [phone] FX     Additional notes:

## 2023-10-19 NOTE — TELEPHONE ENCOUNTER
Pt was verbally informed that  is out of the office, she asked if another provider could call this in I told her they would not without evaluating her     I advise that she go to [facility] or make a same day appointment to be seen 10-20-23 pt request I send this to

## 2023-10-19 NOTE — TELEPHONE ENCOUNTER
Caller: Bijal Aisha M    Relationship: Self    Best call back number: [phone]     What medication are you requesting: SOMETHING TO HELP WITH ITCHY SKIN    What are your current symptoms: ITCHY SKIN    How long have you been experiencing symptoms:     Have you had these symptoms before:    [] Yes  [] No    Have you been treated for these symptoms before:   [] Yes  [] No    If a prescription is needed, what is your preferred pharmacy and phone number: [facility] [id number] - [place], [place] - [address] [facility] [address] - [phone]  - [phone] FX     Additional notes:

## 2023-10-19 NOTE — TELEPHONE ENCOUNTER
Caller: Aisha Appiah    Relationship: Self    Best call back number: [phone]     What is the medical concern/diagnosis: ITCHY SKIN    What specialty or service is being requested: DERMATOLOGY    What is the provider, practice or medical service name: ANY    What is the office location:     What is the office phone number:     Any additional details:

## 2023-10-20 RX ORDER — FLUCONAZOLE 150 MG/1
150 TABLET ORAL ONCE
Qty: 1 TABLET | Refills: 0 | Status: SHIPPED | OUTPATIENT
Start: 2023-10-20 | End: 2023-10-20

## 2023-10-20 RX ORDER — METHYLPREDNISOLONE 4 MG/1
TABLET ORAL
Qty: 1 EACH | Refills: 0 | Status: SHIPPED | OUTPATIENT
Start: 2023-10-20

## 2023-10-20 NOTE — TELEPHONE ENCOUNTER
I sent in a Rx for a steroid to [facility].  If she does not improve she will need to make an appointment to see me.

## 2023-10-24 DIAGNOSIS — M25.561 CHRONIC PAIN OF BOTH KNEES: ICD-10-CM

## 2023-10-24 DIAGNOSIS — M25.562 CHRONIC PAIN OF BOTH KNEES: ICD-10-CM

## 2023-10-24 DIAGNOSIS — G89.29 CHRONIC PAIN OF BOTH KNEES: ICD-10-CM

## 2023-10-25 ENCOUNTER — TELEPHONE (OUTPATIENT)
Dept: FAMILY MEDICINE CLINIC | Facility: CLINIC | Age: 53
End: 2023-10-25
Payer: MEDICAID

## 2023-10-25 RX ORDER — MELOXICAM 15 MG/1
15 TABLET ORAL DAILY
Qty: 90 TABLET | Refills: 1 | Status: SHIPPED | OUTPATIENT
Start: 2023-10-25 | End: 2023-10-27 | Stop reason: SDUPTHER

## 2023-10-25 NOTE — TELEPHONE ENCOUNTER
PT CALLED IN STATING THAT SHE IS ON DAY 3 OF THE STEROID YOU GAVE HER AND THE BENADRYL IS MAKING HER LOOPY. SHE CAN'T MISS WORK TO COME IN AND WANTS TO KNOW IF YOU'LL SEND HER IN SOME SORT OF ORAL MEDICATION FOR THE ITCHING. THE STEROID ISN'T HELPING AT ALL SHE SAID.

## 2023-10-27 ENCOUNTER — OFFICE VISIT (OUTPATIENT)
Dept: FAMILY MEDICINE CLINIC | Facility: CLINIC | Age: 53
End: 2023-10-27
Payer: MEDICAID

## 2023-10-27 ENCOUNTER — LAB (OUTPATIENT)
Dept: FAMILY MEDICINE CLINIC | Facility: CLINIC | Age: 53
End: 2023-10-27
Payer: MEDICAID

## 2023-10-27 VITALS
BODY MASS INDEX: 39.49 KG/M2 | HEIGHT: 67 IN | DIASTOLIC BLOOD PRESSURE: 98 MMHG | SYSTOLIC BLOOD PRESSURE: 146 MMHG | TEMPERATURE: 98.4 F | WEIGHT: 251.6 LBS | HEART RATE: 72 BPM | OXYGEN SATURATION: 95 %

## 2023-10-27 DIAGNOSIS — I10 PRIMARY HYPERTENSION: ICD-10-CM

## 2023-10-27 DIAGNOSIS — E87.6 HYPOKALEMIA: ICD-10-CM

## 2023-10-27 DIAGNOSIS — L30.9 DERMATITIS: Primary | ICD-10-CM

## 2023-10-27 LAB
ANION GAP SERPL CALCULATED.3IONS-SCNC: 6.4 MMOL/L (ref 5–15)
BUN SERPL-MCNC: 24 MG/DL (ref 6–20)
BUN/CREAT SERPL: 21.6 (ref 7–25)
CALCIUM SPEC-SCNC: 9.3 MG/DL (ref 8.6–10.5)
CHLORIDE SERPL-SCNC: 102 MMOL/L (ref 98–107)
CO2 SERPL-SCNC: 30.6 MMOL/L (ref 22–29)
CREAT SERPL-MCNC: 1.11 MG/DL (ref 0.57–1)
EGFRCR SERPLBLD CKD-EPI 2021: 60.7 ML/MIN/1.73
GLUCOSE SERPL-MCNC: 92 MG/DL (ref 65–99)
POTASSIUM SERPL-SCNC: 4.1 MMOL/L (ref 3.5–5.2)
SODIUM SERPL-SCNC: 139 MMOL/L (ref 136–145)

## 2023-10-27 PROCEDURE — 1160F RVW MEDS BY RX/DR IN RCRD: CPT | Performed by: FAMILY MEDICINE

## 2023-10-27 PROCEDURE — 3077F SYST BP >= 140 MM HG: CPT | Performed by: FAMILY MEDICINE

## 2023-10-27 PROCEDURE — 99214 OFFICE O/P EST MOD 30 MIN: CPT | Performed by: FAMILY MEDICINE

## 2023-10-27 PROCEDURE — 36415 COLL VENOUS BLD VENIPUNCTURE: CPT | Performed by: FAMILY MEDICINE

## 2023-10-27 PROCEDURE — 3080F DIAST BP >= 90 MM HG: CPT | Performed by: FAMILY MEDICINE

## 2023-10-27 PROCEDURE — 80048 BASIC METABOLIC PNL TOTAL CA: CPT | Performed by: FAMILY MEDICINE

## 2023-10-27 PROCEDURE — 1159F MED LIST DOCD IN RCRD: CPT | Performed by: FAMILY MEDICINE

## 2023-10-27 RX ORDER — MELOXICAM 15 MG/1
15 TABLET ORAL DAILY
Qty: 90 TABLET | Refills: 1 | Status: SHIPPED | OUTPATIENT
Start: 2023-10-27

## 2023-10-27 RX ORDER — LISINOPRIL AND HYDROCHLOROTHIAZIDE 25; 20 MG/1; MG/1
1 TABLET ORAL DAILY
Qty: 90 TABLET | Refills: 1 | Status: SHIPPED | OUTPATIENT
Start: 2023-10-27

## 2023-10-27 RX ORDER — TRIAMCINOLONE ACETONIDE 1 MG/G
1 CREAM TOPICAL 2 TIMES DAILY
Qty: 80 G | Refills: 1 | Status: SHIPPED | OUTPATIENT
Start: 2023-10-27

## 2023-10-27 NOTE — PROGRESS NOTES
"Chief Complaint  Itching (Of the hands and feet and buttocks on going for months and gradually has worsened )    Tyesha Appiah presents to [facility]  Itching  This is a new problem. The current episode started 1 to 4 weeks ago. The problem occurs constantly. The problem has been unchanged. Pertinent negatives include no chest pain, chills, congestion, coughing, diaphoresis, fatigue, fever or rash. Nothing aggravates the symptoms.   Hypertension  This is a chronic problem. The current episode started more than 1 year ago. The problem is unchanged. Pertinent negatives include no chest pain, malaise/fatigue, peripheral edema or shortness of breath. There are no associated agents to hypertension.       Objective   Vital Signs:  /98 (BP Location: Left arm, Patient Position: Sitting, Cuff Size: Large Adult)   Pulse 72   Temp 98.4 °F (36.9 °C) (Infrared)   Ht 170.2 cm (67.01\")   Wt 114 kg (251 lb 9.6 oz)   SpO2 95%   BMI 39.39 kg/m²   Estimated body mass index is 39.39 kg/m² as calculated from the following:    Height as of this encounter: 170.2 cm (67.01\").    Weight as of this encounter: 114 kg (251 lb 9.6 oz).     Class 2 Severe Obesity (BMI >=35 and <=39.9). Obesity-related health conditions include the following: hypertension and dyslipidemias. Obesity is unchanged. BMI is is above average; BMI management plan is completed. We discussed portion control and increasing exercise.           Physical Exam  Vitals and nursing note reviewed.   Constitutional:       General: She is not in acute distress.     Appearance: She is well-developed.   HENT:      Head: Normocephalic.   Eyes:      General: Lids are normal.      Conjunctiva/sclera: Conjunctivae normal.   Neck:      Thyroid: No thyroid mass or thyromegaly.      Trachea: Trachea normal.   Cardiovascular:      Rate and Rhythm: Normal rate and regular rhythm.      Heart sounds: Normal heart sounds. "   Pulmonary:      Effort: Pulmonary effort is normal.      Breath sounds: Normal breath sounds.   Musculoskeletal:      Cervical back: Normal range of motion.   Lymphadenopathy:      Cervical: No cervical adenopathy.   Skin:     General: Skin is warm and dry.   Neurological:      Mental Status: She is alert and oriented to person, place, and time.   Psychiatric:         Attention and Perception: She is attentive.         Mood and Affect: Mood normal.         Speech: Speech normal.         Behavior: Behavior normal.        Result Review :  The following data was reviewed by: Ariane Beaulieu MD on 10/27/2023:  Common labs          9/12/2023    13:18 10/27/2023    09:42   Common Labs   Glucose 94  92    BUN 13  24    Creatinine 0.97  1.11    Sodium 140  139    Potassium 3.1  4.1    Chloride 101  102    Calcium 9.1  9.3    Albumin 4.0     Total Bilirubin 0.3     Alkaline Phosphatase 58     AST (SGOT) 26     ALT (SGPT) 20     WBC 8.66     Hemoglobin 13.4     Hematocrit 39.5     Platelets 231     Total Cholesterol 229     Triglycerides 196     HDL Cholesterol 92     LDL Cholesterol  104     Hemoglobin A1C 6.30                    Assessment and Plan   Diagnoses and all orders for this visit:    1. Dermatitis (Primary)  -     triamcinolone (KENALOG) 0.1 % cream; Apply 1 application  topically to the appropriate area as directed 2 (Two) Times a Day.  Dispense: 80 g; Refill: 1    2. Hypokalemia  -     Basic Metabolic Panel    3. Primary hypertension  -     lisinopril-hydrochlorothiazide (PRINZIDE,ZESTORETIC) 20-25 MG per tablet; Take 1 tablet by mouth Daily.  Dispense: 90 tablet; Refill: 1    Other orders  -     meloxicam (MOBIC) 15 MG tablet; Take 1 tablet by mouth Daily.  Dispense: 90 tablet; Refill: 1             Follow Up   No follow-ups on file.  Patient was given instructions and counseling regarding her condition or for health maintenance advice. Please see specific information pulled into the AVS if appropriate.

## 2023-10-31 ENCOUNTER — TELEPHONE (OUTPATIENT)
Dept: FAMILY MEDICINE CLINIC | Facility: CLINIC | Age: 53
End: 2023-10-31

## 2023-10-31 RX ORDER — HYDROXYZINE HYDROCHLORIDE 25 MG/1
25 TABLET, FILM COATED ORAL 3 TIMES DAILY PRN
Qty: 30 TABLET | Refills: 0 | Status: SHIPPED | OUTPATIENT
Start: 2023-10-31

## 2023-10-31 NOTE — TELEPHONE ENCOUNTER
Caller: Aisha Appiah    Relationship: Self    Best call back number: [phone]     PATIENT CALLED TO LET DR OCHOA KNOW, THE MEDICATION SHE WAS GIVEN FOR ITCHING IS NOT WORKING.       SHE WAS GIVEN A STEROID AND A CREAM, AND NEITHER ARE HELPING.    PLEASE CALL AND ADVISE PATIENT.

## 2023-11-12 PROBLEM — L30.9 DERMATITIS: Status: ACTIVE | Noted: 2023-11-12

## 2023-11-12 PROBLEM — E87.6 HYPOKALEMIA: Status: ACTIVE | Noted: 2023-11-12

## 2024-01-03 ENCOUNTER — OFFICE VISIT (OUTPATIENT)
Dept: FAMILY MEDICINE CLINIC | Facility: CLINIC | Age: 54
End: 2024-01-03
Payer: MEDICAID

## 2024-01-03 VITALS
OXYGEN SATURATION: 95 % | HEART RATE: 87 BPM | TEMPERATURE: 97.2 F | DIASTOLIC BLOOD PRESSURE: 86 MMHG | RESPIRATION RATE: 16 BRPM | SYSTOLIC BLOOD PRESSURE: 136 MMHG | WEIGHT: 261 LBS | HEIGHT: 67 IN | BODY MASS INDEX: 40.97 KG/M2

## 2024-01-03 DIAGNOSIS — J30.1 ALLERGIC RHINITIS DUE TO POLLEN, UNSPECIFIED SEASONALITY: ICD-10-CM

## 2024-01-03 DIAGNOSIS — J06.9 VIRAL URI WITH COUGH: Primary | ICD-10-CM

## 2024-01-03 DIAGNOSIS — E78.5 HYPERLIPIDEMIA, UNSPECIFIED HYPERLIPIDEMIA TYPE: ICD-10-CM

## 2024-01-03 LAB
EXPIRATION DATE: NORMAL
INTERNAL CONTROL: NORMAL
Lab: NORMAL
S PYO AG THROAT QL: NEGATIVE

## 2024-01-03 RX ORDER — ATORVASTATIN CALCIUM 20 MG/1
20 TABLET, FILM COATED ORAL NIGHTLY
Qty: 90 TABLET | Refills: 1 | Status: SHIPPED | OUTPATIENT
Start: 2024-01-03

## 2024-01-03 RX ORDER — PREDNISONE 20 MG/1
TABLET ORAL
Qty: 14 TABLET | Refills: 0 | Status: SHIPPED | OUTPATIENT
Start: 2024-01-03

## 2024-01-03 RX ORDER — CETIRIZINE HYDROCHLORIDE 10 MG/1
10 TABLET ORAL DAILY
Qty: 30 TABLET | Refills: 2 | Status: SHIPPED | OUTPATIENT
Start: 2024-01-03

## 2024-01-03 RX ORDER — BENZONATATE 200 MG/1
200 CAPSULE ORAL 3 TIMES DAILY PRN
Qty: 30 CAPSULE | Refills: 0 | Status: SHIPPED | OUTPATIENT
Start: 2024-01-03

## 2024-01-03 NOTE — PROGRESS NOTES
"Chief Complaint  Bronchitis    Subjective        Aisha M Bijal presents to [facility]  History of Present Illness    Upper respiratory symptoms:   Onset of symptoms 5 days ago. Severity of symptoms are moderate. Status is no change/worsening. Frequency is persistent. Context includes history of smoking--quit 15 years ago. Exposure to second hand smoke. Symptoms are aggravated by nothing. Symptoms are relieved by Theraflu and DM cough syrup.   Associated symptoms fatigue, pharyngitis, myalgia, post nasal drainage, cough, sinus pressure, dyspnea, and wheezing. Pertinent negatives include chills, dyspnea, facial pain, fever, headache, hemoptysis, nasal congestion, otalgia, rash, rhinitis, sputum, tooth pain, pleuritic pain, nausea, vomiting, and diarrhea.       Objective   Vital Signs:  /86 (BP Location: Left arm, Patient Position: Sitting, Cuff Size: Adult)   Pulse 87   Temp 97.2 °F (36.2 °C) (Infrared)   Resp 16   Ht 170.2 cm (67.01\")   Wt 118 kg (261 lb)   SpO2 95%   BMI 40.87 kg/m²   Estimated body mass index is 40.87 kg/m² as calculated from the following:    Height as of this encounter: 170.2 cm (67.01\").    Weight as of this encounter: 118 kg (261 lb).         Physical Exam  Constitutional:       General: She is not in acute distress.     Appearance: She is obese.   HENT:      Head: Normocephalic and atraumatic.      Right Ear: Tympanic membrane, ear canal and external ear normal.      Left Ear: Tympanic membrane, ear canal and external ear normal.      Nose: Nose normal. Congestion present. No rhinorrhea.      Right Sinus: No maxillary sinus tenderness or frontal sinus tenderness.      Left Sinus: No maxillary sinus tenderness or frontal sinus tenderness.      Mouth/Throat:      Mouth: Mucous membranes are moist.      Pharynx: Posterior oropharyngeal erythema present. No oropharyngeal exudate.   Eyes:      Conjunctiva/sclera: Conjunctivae normal. "   Cardiovascular:      Rate and Rhythm: Normal rate and regular rhythm.      Chest Wall: PMI is not displaced.      Heart sounds: Normal heart sounds, S1 normal and S2 normal.   Pulmonary:      Effort: Pulmonary effort is normal.      Breath sounds: Normal breath sounds.      Comments: Cough occasional   Abdominal:      General: Bowel sounds are normal.      Palpations: Abdomen is soft.      Tenderness: There is no abdominal tenderness.   Musculoskeletal:      Cervical back: Neck supple.   Lymphadenopathy:      Cervical: No cervical adenopathy.   Skin:     General: Skin is warm and dry.   Neurological:      Mental Status: She is alert and oriented to person, place, and time.      Gait: Gait is intact.   Psychiatric:         Mood and Affect: Mood and affect normal.         Speech: Speech normal.         Thought Content: Thought content normal.         Cognition and Memory: Memory normal.         Judgment: Judgment normal.        Result Review :                   Assessment and Plan   Diagnoses and all orders for this visit:    1. Viral URI with cough (Primary)  Comments:  Rapid strep negative.  Recommended plain Mucinex and Flonase over-the-counter.  Increase fluids.  Return if symptoms persist or worsen.  Orders:  -     POC Rapid Strep A  -     predniSONE (DELTASONE) 20 MG tablet; Take 2 tabs daily x 5 days, 1 tab daily x 3 days, 1/2 tab daily x 2 days  Dispense: 14 tablet; Refill: 0  -     benzonatate (TESSALON) 200 MG capsule; Take 1 capsule by mouth 3 (Three) Times a Day As Needed for Cough.  Dispense: 30 capsule; Refill: 0             Follow Up   Return if symptoms worsen or fail to improve.  Patient was given instructions and counseling regarding her condition or for health maintenance advice. Please see specific information pulled into the AVS if appropriate.

## 2024-01-15 DIAGNOSIS — J30.1 ALLERGIC RHINITIS DUE TO POLLEN, UNSPECIFIED SEASONALITY: ICD-10-CM

## 2024-01-15 DIAGNOSIS — L30.9 DERMATITIS: ICD-10-CM

## 2024-01-15 DIAGNOSIS — M25.562 CHRONIC PAIN OF BOTH KNEES: ICD-10-CM

## 2024-01-15 DIAGNOSIS — E78.5 HYPERLIPIDEMIA, UNSPECIFIED HYPERLIPIDEMIA TYPE: ICD-10-CM

## 2024-01-15 DIAGNOSIS — I10 PRIMARY HYPERTENSION: ICD-10-CM

## 2024-01-15 DIAGNOSIS — G89.29 CHRONIC PAIN OF BOTH KNEES: ICD-10-CM

## 2024-01-15 DIAGNOSIS — J06.9 VIRAL URI WITH COUGH: ICD-10-CM

## 2024-01-15 DIAGNOSIS — M25.561 CHRONIC PAIN OF BOTH KNEES: ICD-10-CM

## 2024-01-15 RX ORDER — ATORVASTATIN CALCIUM 20 MG/1
20 TABLET, FILM COATED ORAL NIGHTLY
Qty: 90 TABLET | Refills: 1 | Status: SHIPPED | OUTPATIENT
Start: 2024-01-15

## 2024-01-15 RX ORDER — PREDNISONE 20 MG/1
TABLET ORAL
Qty: 14 TABLET | Refills: 0 | Status: SHIPPED | OUTPATIENT
Start: 2024-01-15

## 2024-01-15 RX ORDER — MELOXICAM 15 MG/1
15 TABLET ORAL DAILY
Qty: 90 TABLET | Refills: 1 | Status: SHIPPED | OUTPATIENT
Start: 2024-01-15

## 2024-01-15 RX ORDER — TRIAMCINOLONE ACETONIDE 1 MG/G
1 CREAM TOPICAL 2 TIMES DAILY
Qty: 80 G | Refills: 1 | Status: SHIPPED | OUTPATIENT
Start: 2024-01-15

## 2024-01-15 RX ORDER — HYDROXYZINE HYDROCHLORIDE 25 MG/1
25 TABLET, FILM COATED ORAL 3 TIMES DAILY PRN
Qty: 30 TABLET | Refills: 0 | Status: SHIPPED | OUTPATIENT
Start: 2024-01-15

## 2024-01-15 RX ORDER — CETIRIZINE HYDROCHLORIDE 10 MG/1
10 TABLET ORAL DAILY
Qty: 30 TABLET | Refills: 2 | Status: SHIPPED | OUTPATIENT
Start: 2024-01-15

## 2024-01-15 RX ORDER — LISINOPRIL AND HYDROCHLOROTHIAZIDE 25; 20 MG/1; MG/1
1 TABLET ORAL DAILY
Qty: 90 TABLET | Refills: 1 | Status: SHIPPED | OUTPATIENT
Start: 2024-01-15

## 2024-01-15 NOTE — TELEPHONE ENCOUNTER
Caller: Aisha Appiah    Relationship: Self    Best call back number: [phone]    Requested Prescriptions:   Requested Prescriptions     Pending Prescriptions Disp Refills    atorvastatin (LIPITOR) 20 MG tablet 90 tablet 1     Sig: Take 1 tablet by mouth Every Night.    lisinopril-hydrochlorothiazide (PRINZIDE,ZESTORETIC) 20-25 MG per tablet 90 tablet 1     Sig: Take 1 tablet by mouth Daily.    meloxicam (MOBIC) 15 MG tablet 90 tablet 1     Sig: Take 1 tablet by mouth Daily.    cetirizine (zyrTEC) 10 MG tablet 30 tablet 2     Sig: Take 1 tablet by mouth Daily.    Diclofenac Sodium (VOLTAREN) 1 % gel gel 100 g 1    triamcinolone (KENALOG) 0.1 % cream 80 g 1     Sig: Apply 1 application  topically to the appropriate area as directed 2 (Two) Times a Day.    hydrOXYzine (ATARAX) 25 MG tablet 30 tablet 0     Sig: Take 1 tablet by mouth 3 (Three) Times a Day As Needed for Itching.        Pharmacy where request should be sent: [facility] [id number] - [place], [place] - [address] AT [facility] [address] - [phone] [facility] [phone] FX     Last office visit with prescribing clinician: 10/27/2023   Last telemedicine visit with prescribing clinician: Visit date not found   Next office visit with prescribing clinician: Visit date not found     Additional details provided by patient: PATIENT STATED MEDICATIONS NEED PRIOR AUTHORIZATION, BUT THEN STATED THE PHARMACY NEEDS PRESCRIPTIONS FROM PROVIDER         Hernandez Doll   01/15/24 15:12 EST

## 2024-03-28 NOTE — TELEPHONE ENCOUNTER
Please let her know.  I can try again in a year to get a CT done.  In the meantime she could get a chest x-ray.  Is she interested in this?   Yes...

## 2024-04-09 DIAGNOSIS — L30.9 DERMATITIS: ICD-10-CM

## 2024-04-09 DIAGNOSIS — J30.1 ALLERGIC RHINITIS DUE TO POLLEN, UNSPECIFIED SEASONALITY: ICD-10-CM

## 2024-04-09 RX ORDER — TRIAMCINOLONE ACETONIDE 1 MG/G
1 CREAM TOPICAL 2 TIMES DAILY
Qty: 80 G | Refills: 1 | Status: SHIPPED | OUTPATIENT
Start: 2024-04-09

## 2024-04-09 RX ORDER — MELOXICAM 15 MG/1
15 TABLET ORAL DAILY
Qty: 90 TABLET | Refills: 1 | Status: SHIPPED | OUTPATIENT
Start: 2024-04-09

## 2024-04-09 RX ORDER — HYDROXYZINE HYDROCHLORIDE 25 MG/1
25 TABLET, FILM COATED ORAL 3 TIMES DAILY PRN
Qty: 30 TABLET | Refills: 0 | Status: SHIPPED | OUTPATIENT
Start: 2024-04-09

## 2024-04-09 RX ORDER — CETIRIZINE HYDROCHLORIDE 10 MG/1
10 TABLET ORAL DAILY
Qty: 30 TABLET | Refills: 2 | Status: SHIPPED | OUTPATIENT
Start: 2024-04-09

## 2024-05-15 ENCOUNTER — OFFICE VISIT (OUTPATIENT)
Dept: FAMILY MEDICINE CLINIC | Facility: CLINIC | Age: 54
End: 2024-05-15
Payer: MEDICAID

## 2024-05-15 VITALS
DIASTOLIC BLOOD PRESSURE: 90 MMHG | OXYGEN SATURATION: 96 % | SYSTOLIC BLOOD PRESSURE: 140 MMHG | TEMPERATURE: 98 F | BODY MASS INDEX: 39.8 KG/M2 | HEIGHT: 67 IN | HEART RATE: 77 BPM | WEIGHT: 253.6 LBS

## 2024-05-15 DIAGNOSIS — G89.29 CHRONIC BILATERAL LOW BACK PAIN WITHOUT SCIATICA: ICD-10-CM

## 2024-05-15 DIAGNOSIS — G44.52 NEW DAILY PERSISTENT HEADACHE: Primary | ICD-10-CM

## 2024-05-15 DIAGNOSIS — M54.50 CHRONIC BILATERAL LOW BACK PAIN WITHOUT SCIATICA: ICD-10-CM

## 2024-05-15 PROCEDURE — 3077F SYST BP >= 140 MM HG: CPT | Performed by: FAMILY MEDICINE

## 2024-05-15 PROCEDURE — 99213 OFFICE O/P EST LOW 20 MIN: CPT | Performed by: FAMILY MEDICINE

## 2024-05-15 PROCEDURE — 1125F AMNT PAIN NOTED PAIN PRSNT: CPT | Performed by: FAMILY MEDICINE

## 2024-05-15 PROCEDURE — 3080F DIAST BP >= 90 MM HG: CPT | Performed by: FAMILY MEDICINE

## 2024-05-15 RX ORDER — RIMEGEPANT SULFATE 75 MG/75MG
75 TABLET, ORALLY DISINTEGRATING ORAL
Qty: 2 TABLET | Refills: 0 | COMMUNITY
Start: 2024-05-15

## 2024-05-15 NOTE — PROGRESS NOTES
"Chief Complaint  Headache and Back Pain    Tyesha Appiah presents to [facility]  Headache  Headache pattern:  Some headache always there, and the pain level varies  Duration:  4 to 8 weeks  Number of ER visits for headache:  0  Location:  All over  Headaches last more than three days?: Yes    Aggravating factors:  Light and changing position  Changes in thinking and mood:  Not feeling right  Changes in vision:  Blurred vision  Stomach/GI changes:  None  Abortive medications tried:  Ibuprofen  Preventative medications tried:  None  Alternative treatments tried:  None  Back Pain  This is a chronic problem. The current episode started more than 1 year ago. The problem occurs daily. The problem has been worse since onset. The pain is present in the lumbar spine. The quality of the pain is described as aching. The pain radiates to the right foot. The pain is moderate. Associated symptoms include headaches and leg pain. Pertinent negatives include no bladder incontinence, bowel incontinence, chest pain, dysuria, fever or numbness.       Objective   Vital Signs:  /90 (BP Location: Left arm, Patient Position: Sitting, Cuff Size: Large Adult)   Pulse 77   Temp 98 °F (36.7 °C) (Infrared)   Ht 170.2 cm (67.01\")   Wt 115 kg (253 lb 9.6 oz)   SpO2 96%   BMI 39.71 kg/m²   Estimated body mass index is 39.71 kg/m² as calculated from the following:    Height as of this encounter: 170.2 cm (67.01\").    Weight as of this encounter: 115 kg (253 lb 9.6 oz).               Physical Exam  Vitals and nursing note reviewed.   Constitutional:       General: She is not in acute distress.     Appearance: She is well-developed.   HENT:      Head: Normocephalic.   Eyes:      General: Lids are normal.      Conjunctiva/sclera: Conjunctivae normal.   Neck:      Thyroid: No thyroid mass or thyromegaly.      Trachea: Trachea normal.   Cardiovascular:      Rate and Rhythm: Normal rate " and regular rhythm.      Heart sounds: Normal heart sounds.   Pulmonary:      Effort: Pulmonary effort is normal.      Breath sounds: Normal breath sounds.   Musculoskeletal:      Cervical back: Normal range of motion.      Lumbar back: Tenderness present. Decreased range of motion.   Lymphadenopathy:      Cervical: No cervical adenopathy.   Skin:     General: Skin is warm and dry.   Neurological:      Mental Status: She is alert and oriented to person, place, and time.   Psychiatric:         Attention and Perception: She is attentive.         Mood and Affect: Mood normal.         Speech: Speech normal.         Behavior: Behavior normal.        Result Review :    The following data was reviewed by: Ariane Beaulieu MD on 05/15/2024:  Common labs          9/12/2023    13:18 10/27/2023    09:42   Common Labs   Glucose 94  92    BUN 13  24    Creatinine 0.97  1.11    Sodium 140  139    Potassium 3.1  4.1    Chloride 101  102    Calcium 9.1  9.3    Albumin 4.0     Total Bilirubin 0.3     Alkaline Phosphatase 58     AST (SGOT) 26     ALT (SGPT) 20     WBC 8.66     Hemoglobin 13.4     Hematocrit 39.5     Platelets 231     Total Cholesterol 229     Triglycerides 196     HDL Cholesterol 92     LDL Cholesterol  104     Hemoglobin A1C 6.30                    Assessment and Plan     Diagnoses and all orders for this visit:    1. New daily persistent headache (Primary)  -     CT Head With & Without Contrast; Future  -     Rimegepant Sulfate (Nurtec) 75 MG tablet dispersible tablet; Take 1 tablet by mouth Every Other Day As Needed (headache).  Dispense: 2 tablet; Refill: 0    2. Chronic bilateral low back pain without sciatica  -     XR Spine Lumbar 2 or 3 View; Future  -     Ambulatory Referral to Pain Management  -     diclofenac (VOLTAREN) 50 MG EC tablet; Take 1 tablet by mouth 2 (Two) Times a Day As Needed (back pain).  Dispense: 180 tablet; Refill: 1             Follow Up     No follow-ups on file.  Patient was given  instructions and counseling regarding her condition or for health maintenance advice. Please see specific information pulled into the AVS if appropriate.

## 2024-06-11 ENCOUNTER — OFFICE VISIT (OUTPATIENT)
Dept: FAMILY MEDICINE CLINIC | Facility: CLINIC | Age: 54
End: 2024-06-11
Payer: MEDICAID

## 2024-06-11 ENCOUNTER — LAB (OUTPATIENT)
Dept: FAMILY MEDICINE CLINIC | Facility: CLINIC | Age: 54
End: 2024-06-11
Payer: MEDICAID

## 2024-06-11 VITALS
TEMPERATURE: 97.8 F | WEIGHT: 254.3 LBS | SYSTOLIC BLOOD PRESSURE: 138 MMHG | HEIGHT: 67 IN | HEART RATE: 74 BPM | BODY MASS INDEX: 39.91 KG/M2 | OXYGEN SATURATION: 96 % | DIASTOLIC BLOOD PRESSURE: 93 MMHG

## 2024-06-11 DIAGNOSIS — Z00.00 WELLNESS EXAMINATION: Primary | ICD-10-CM

## 2024-06-11 DIAGNOSIS — G44.52 NEW DAILY PERSISTENT HEADACHE: Primary | ICD-10-CM

## 2024-06-11 DIAGNOSIS — Z87.891 FORMER SMOKER: ICD-10-CM

## 2024-06-11 DIAGNOSIS — Z12.31 ENCOUNTER FOR SCREENING MAMMOGRAM FOR BREAST CANCER: ICD-10-CM

## 2024-06-11 DIAGNOSIS — Z12.11 SCREEN FOR COLON CANCER: ICD-10-CM

## 2024-06-11 LAB
ALBUMIN SERPL-MCNC: 4 G/DL (ref 3.5–5.2)
ALBUMIN UR-MCNC: <1.2 MG/DL
ALBUMIN/GLOB SERPL: 1.5 G/DL
ALP SERPL-CCNC: 49 U/L (ref 39–117)
ALT SERPL W P-5'-P-CCNC: 24 U/L (ref 1–33)
ANION GAP SERPL CALCULATED.3IONS-SCNC: 8 MMOL/L (ref 5–15)
AST SERPL-CCNC: 25 U/L (ref 1–32)
BASOPHILS # BLD AUTO: 0.03 10*3/MM3 (ref 0–0.2)
BASOPHILS NFR BLD AUTO: 0.6 % (ref 0–1.5)
BILIRUB SERPL-MCNC: 0.3 MG/DL (ref 0–1.2)
BUN SERPL-MCNC: 11 MG/DL (ref 6–20)
BUN/CREAT SERPL: 12.1 (ref 7–25)
CALCIUM SPEC-SCNC: 9 MG/DL (ref 8.6–10.5)
CHLORIDE SERPL-SCNC: 106 MMOL/L (ref 98–107)
CHOLEST SERPL-MCNC: 266 MG/DL (ref 0–200)
CO2 SERPL-SCNC: 28 MMOL/L (ref 22–29)
CREAT SERPL-MCNC: 0.91 MG/DL (ref 0.57–1)
CREAT UR-MCNC: 216.9 MG/DL
DEPRECATED RDW RBC AUTO: 45.2 FL (ref 37–54)
EGFRCR SERPLBLD CKD-EPI 2021: 76.5 ML/MIN/1.73
EOSINOPHIL # BLD AUTO: 0.15 10*3/MM3 (ref 0–0.4)
EOSINOPHIL NFR BLD AUTO: 2.9 % (ref 0.3–6.2)
ERYTHROCYTE [DISTWIDTH] IN BLOOD BY AUTOMATED COUNT: 12.9 % (ref 12.3–15.4)
GLOBULIN UR ELPH-MCNC: 2.7 GM/DL
GLUCOSE SERPL-MCNC: 92 MG/DL (ref 65–99)
HBA1C MFR BLD: 6 % (ref 4.8–5.6)
HCT VFR BLD AUTO: 37.3 % (ref 34–46.6)
HDLC SERPL-MCNC: 77 MG/DL (ref 40–60)
HGB BLD-MCNC: 12.7 G/DL (ref 12–15.9)
IMM GRANULOCYTES # BLD AUTO: 0.02 10*3/MM3 (ref 0–0.05)
IMM GRANULOCYTES NFR BLD AUTO: 0.4 % (ref 0–0.5)
LDLC SERPL CALC-MCNC: 175 MG/DL (ref 0–100)
LDLC/HDLC SERPL: 2.23 {RATIO}
LYMPHOCYTES # BLD AUTO: 1.93 10*3/MM3 (ref 0.7–3.1)
LYMPHOCYTES NFR BLD AUTO: 36.8 % (ref 19.6–45.3)
MCH RBC QN AUTO: 32.8 PG (ref 26.6–33)
MCHC RBC AUTO-ENTMCNC: 34 G/DL (ref 31.5–35.7)
MCV RBC AUTO: 96.4 FL (ref 79–97)
MICROALBUMIN/CREAT UR: NORMAL MG/G{CREAT}
MONOCYTES # BLD AUTO: 0.37 10*3/MM3 (ref 0.1–0.9)
MONOCYTES NFR BLD AUTO: 7.1 % (ref 5–12)
NEUTROPHILS NFR BLD AUTO: 2.74 10*3/MM3 (ref 1.7–7)
NEUTROPHILS NFR BLD AUTO: 52.2 % (ref 42.7–76)
NRBC BLD AUTO-RTO: 0 /100 WBC (ref 0–0.2)
PLATELET # BLD AUTO: 207 10*3/MM3 (ref 140–450)
PMV BLD AUTO: 10.3 FL (ref 6–12)
POTASSIUM SERPL-SCNC: 3.9 MMOL/L (ref 3.5–5.2)
PROT SERPL-MCNC: 6.7 G/DL (ref 6–8.5)
RBC # BLD AUTO: 3.87 10*6/MM3 (ref 3.77–5.28)
SODIUM SERPL-SCNC: 142 MMOL/L (ref 136–145)
TRIGL SERPL-MCNC: 86 MG/DL (ref 0–150)
TSH SERPL DL<=0.05 MIU/L-ACNC: 1.66 UIU/ML (ref 0.27–4.2)
VLDLC SERPL-MCNC: 14 MG/DL (ref 5–40)
WBC NRBC COR # BLD AUTO: 5.24 10*3/MM3 (ref 3.4–10.8)

## 2024-06-11 PROCEDURE — 3080F DIAST BP >= 90 MM HG: CPT | Performed by: FAMILY MEDICINE

## 2024-06-11 PROCEDURE — 1125F AMNT PAIN NOTED PAIN PRSNT: CPT | Performed by: FAMILY MEDICINE

## 2024-06-11 PROCEDURE — 1159F MED LIST DOCD IN RCRD: CPT | Performed by: FAMILY MEDICINE

## 2024-06-11 PROCEDURE — 36415 COLL VENOUS BLD VENIPUNCTURE: CPT | Performed by: FAMILY MEDICINE

## 2024-06-11 PROCEDURE — 82570 ASSAY OF URINE CREATININE: CPT | Performed by: FAMILY MEDICINE

## 2024-06-11 PROCEDURE — 3075F SYST BP GE 130 - 139MM HG: CPT | Performed by: FAMILY MEDICINE

## 2024-06-11 PROCEDURE — 80050 GENERAL HEALTH PANEL: CPT | Performed by: FAMILY MEDICINE

## 2024-06-11 PROCEDURE — 80061 LIPID PANEL: CPT | Performed by: FAMILY MEDICINE

## 2024-06-11 PROCEDURE — 99396 PREV VISIT EST AGE 40-64: CPT | Performed by: FAMILY MEDICINE

## 2024-06-11 PROCEDURE — 82043 UR ALBUMIN QUANTITATIVE: CPT | Performed by: FAMILY MEDICINE

## 2024-06-11 PROCEDURE — 83036 HEMOGLOBIN GLYCOSYLATED A1C: CPT | Performed by: FAMILY MEDICINE

## 2024-06-11 PROCEDURE — 1160F RVW MEDS BY RX/DR IN RCRD: CPT | Performed by: FAMILY MEDICINE

## 2024-06-11 NOTE — PROGRESS NOTES
"Tyesha Appiah is a 53 y.o. female and is here for a comprehensive physical exam. The patient reports no problems.    Do you take any herbs or supplements that were not prescribed by a doctor? no  Are you taking calcium supplements? no  Are you taking aspirin daily? no     History:  LMP 10 years ago.      The following portions of the patient's history were reviewed and updated as appropriate: allergies, current medications, past family history, past medical history, past social history, past surgical history, and problem list.    Review of Systems  Do you have pain that bothers you in your daily life? not asked  A comprehensive review of systems was negative.    Objective   /93 (BP Location: Left arm, Patient Position: Sitting, Cuff Size: Large Adult)   Pulse 74   Temp 97.8 °F (36.6 °C) (Infrared)   Ht 170.2 cm (67.01\")   Wt 115 kg (254 lb 4.8 oz)   SpO2 96%   BMI 39.82 kg/m²   General appearance: alert, appears stated age, and cooperative  Head: Normocephalic, without obvious abnormality, atraumatic  Eyes: conjunctivae/corneas clear. PERRL, EOM's intact. Fundi benign.  Neck: no adenopathy, no JVD, supple, symmetrical, trachea midline, and thyroid not enlarged, symmetric, no tenderness/mass/nodules  Lungs: clear to auscultation bilaterally  Breasts: normal appearance, no masses or tenderness  Heart: regular rate and rhythm, S1, S2 normal, no murmur, click, rub or gallop  Abdomen: soft, non-tender; bowel sounds normal; no masses,  no organomegaly  Pelvic: cervix normal in appearance, external genitalia normal, no adnexal masses or tenderness, no cervical motion tenderness, uterus normal size, shape, and consistency, and vagina normal without discharge  Extremities: extremities normal, atraumatic, no cyanosis or edema  Pulses: 2+ and symmetric  Skin: Skin color, texture, turgor normal. No rashes or lesions  Lymph nodes: Cervical, supraclavicular, and axillary nodes normal.  Neurologic: " Grossly normal     No visits with results within 1 Week(s) from this visit.   Latest known visit with results is:   Office Visit on 01/03/2024   Component Date Value Ref Range Status    Rapid Strep A Screen 01/03/2024 Negative  Negative, VALID, INVALID, Not Performed Final    Internal Control 01/03/2024 Passed  Passed Final    Lot Number 01/03/2024 [id number]   Final    Expiration Date 01/03/2024 02/27/25   Final       Assessment & Plan   Healthy female exam.   Diagnoses and all orders for this visit:    1. Wellness examination (Primary)  -     IGP,Aptima HPV,Age Gdln  -     CBC & Differential  -     Comprehensive Metabolic Panel  -     Hemoglobin A1c  -     Microalbumin / Creatinine Urine Ratio - Urine, Clean Catch  -     Lipid Panel  -     TSH    2. Encounter for screening mammogram for breast cancer  -     Mammo Screening Digital Tomosynthesis Bilateral With CAD    3. Screen for colon cancer  -     Cologuard - Stool, Per Rectum; Future    4. Former smoker  -     CT Chest Low Dose Wo; Future      1. Well exam.   2. Patient Counseling:  --Nutrition: Stressed importance of moderation in sodium/caffeine intake, saturated fat and cholesterol, caloric balance, sufficient intake of fresh fruits, vegetables, fiber, calcium, iron  --Exercise: Stressed the importance of regular exercise.   --Dental health: Discussed importance of regular tooth brushing, flossing, and dental visits.  --Immunizations reviewed.  --Discussed benefits of screening colonoscopy.    3. Discussed the patient's BMI with her.  The BMI is above average; BMI management plan is completed  4. Follow up in one year

## 2024-06-13 LAB
AGE GDLN ACOG TESTING: NORMAL
CYTOLOGIST CVX/VAG CYTO: NORMAL
CYTOLOGY CVX/VAG DOC CYTO: NORMAL
CYTOLOGY CVX/VAG DOC THIN PREP: NORMAL
DX ICD CODE: NORMAL
HPV GENOTYPE REFLEX: NORMAL
HPV I/H RISK 4 DNA CVX QL PROBE+SIG AMP: NEGATIVE
Lab: NORMAL
OTHER STN SPEC: NORMAL
STAT OF ADQ CVX/VAG CYTO-IMP: NORMAL

## 2024-06-14 NOTE — PROGRESS NOTES
The pt was verbally notified and understood and stated she is taking the Atorvastatin but not like she should be but she will take it as prescribed for now on

## 2024-06-24 ENCOUNTER — HOSPITAL ENCOUNTER (OUTPATIENT)
Dept: MRI IMAGING | Facility: HOSPITAL | Age: 54
Discharge: HOME | End: 2024-06-24
Payer: MEDICAID

## 2024-06-24 ENCOUNTER — HOSPITAL ENCOUNTER (OUTPATIENT)
Dept: GENERAL RADIOLOGY | Facility: HOSPITAL | Age: 54
Discharge: HOME | End: 2024-06-24
Payer: MEDICAID

## 2024-06-24 ENCOUNTER — TELEPHONE (OUTPATIENT)
Dept: FAMILY MEDICINE CLINIC | Facility: CLINIC | Age: 54
End: 2024-06-24
Payer: MEDICAID

## 2024-06-24 DIAGNOSIS — G89.29 CHRONIC BILATERAL LOW BACK PAIN WITHOUT SCIATICA: ICD-10-CM

## 2024-06-24 DIAGNOSIS — M54.50 CHRONIC BILATERAL LOW BACK PAIN WITHOUT SCIATICA: ICD-10-CM

## 2024-06-24 DIAGNOSIS — G44.52 NEW DAILY PERSISTENT HEADACHE: ICD-10-CM

## 2024-06-24 PROCEDURE — 72100 X-RAY EXAM L-S SPINE 2/3 VWS: CPT

## 2024-06-24 NOTE — TELEPHONE ENCOUNTER
Caller: Aisha Appiah    Relationship to patient: Self    Best call back number: [phone]    Patient is needing: SHE WOULD LIKE TO GO TO [facility].  THE PHONE NUMBER [phone]

## 2024-06-24 NOTE — TELEPHONE ENCOUNTER
HUB TO RELAY    CALLED AND LVM ADVISING THE AUTH WAS UPDATED AND FAXED TO [facility]. ADVISED SHE CAN CALL THEM TO SET UP HER APPT.

## 2024-06-24 NOTE — TELEPHONE ENCOUNTER
Caller: Aisha Appiah    Relationship: Self    Best call back number: [phone]    What orders are you requesting (i.e. lab or imaging): OPEN SIDED MRI    In what timeframe would the patient need to come in: AS SOON AS POSSIBLE    Where will you receive your lab/imaging services: [facility]    Additional notes: PATIENT WAS UNABLE TO GO THROUGH THE [facility]EL TODAY AT [facility]    IF [facility] IN [place] TAKES PATIENTS INSURANCE SHE WOULD LIKE THE REFERRAL THERE.

## 2024-06-24 NOTE — TELEPHONE ENCOUNTER
Name: Aisha Appiah      Relationship: Self      Best Callback Number: [phone] (Mobile)       HUB PROVIDED THE RELAY MESSAGE FROM THE OFFICE    HUB TO RELAY     CALLED AND LVM ADVISING THE AUTH WAS UPDATED AND FAXED TO [facility]. ADVISED SHE CAN CALL THEM TO SET UP HER APPT.      PATIENT: VOICED UNDERSTANDING AND HAS NO FURTHER QUESTIONS AT THIS TIME    ADDITIONAL INFORMATION: N/A

## 2024-06-28 NOTE — PROGRESS NOTES
CHIEF COMPLAINT  Lower back pain, bilateral shoulder pain      Subjective   Aishajavan Appiah is a 53 y.o. female who was referred by Dr. Beaulieu, Ariane Goetz MD to our pain management clinic for consultation, evaluation and treatment of lower back pain. She had chronic lower back pain for long time but significantly worsened with being constant for last 1 month. She has been seeing orthopedics for bilateral knee gel and steroids injections and bilateral shoulder pain.  She was diagnosed with strain of deltoid muscle in 2021 by Dr. Solis and was recommended biomed cream. She has tried multiple NSAIDs, tylenol, lidoderm patches, baclofen without much relief.     Lower back pain is 7/10 on VAS, at maximum is 10/10. Pain is aching, pressure, throbbing, burning, sharp, tingling, soreness, spasms, numbness, stabbing in nature. Pain is referred right buttock, right posterior thigh; burning feeling in R buttock. The pain is constant. The pain is improved by tylenol PM. The pain is worse with any increased activities, working and being in 1 position with sitting or standing.  Affecting her sleep and ability to work.    Chronic mostly daily migraines in the base of her neck radiating to top of her head.  Scheduled for MRI of her head.    PHQ-9- 10    SOAPP- 2  [place] back disability scale - 56    PMH:   Depression, hypertension, bilateral knee pain    Current Medications:   Baclofen 10 mg  Diclofenac 50 mg  Voltaren 1% gel  Lidoderm patches 5%  Nurtec      Past Medications:  Meloxicam didn't help  Diclofeanc      Past Modalities:  TENS:       no          Physical Therapy Within The Last 6 Months     Yes- [facility] - 2 months finished early 2024. Some relief; she has been doing home exercises daily.   Psychotherapy     no  Massage Therapy      no    Patient Complains Of:  Uro-Fecal Incontinence no  Weight Gain/Loss  no  Fever/Chills   no  Weakness   no      PEG Assessment   What number best describes your pain on average in  the past week?7  What number best describes how, during the past week, pain has interfered with your enjoyment of life?7  What number best describes how, during the past week, pain has interfered with your general activity?  7    PHQ-9 Depression Screening  Little interest or pleasure in doing things? 2-->more than half the days   Feeling down, depressed, or hopeless? 1-->several days   Trouble falling or staying asleep, or sleeping too much? 2-->more than half the days   Feeling tired or having little energy? 2-->more than half the days   Poor appetite or overeating? 0-->not at all   Feeling bad about yourself - or that you are a failure or have let yourself or your family down? 2-->more than half the days   Trouble concentrating on things, such as reading the newspaper or watching television? 1-->several days   Moving or speaking so slowly that other people could have noticed? Or the opposite - being so fidgety or restless that you have been moving around a lot more than usual? 0-->not at all   Thoughts that you would be better off dead, or of hurting yourself in some way? 0-->not at all   PHQ-9 Total Score 10   If you checked off any problems, how difficult have these problems made it for you to do your work, take care of things at home, or get along with other people? extremely difficult         Current Outpatient Medications:     atorvastatin (LIPITOR) 20 MG tablet, Take 1 tablet by mouth Every Night., Disp: 90 tablet, Rfl: 1    baclofen (LIORESAL) 10 MG tablet, Take 1 tablet by mouth 3 (Three) Times a Day., Disp: 30 tablet, Rfl: 0    cetirizine (zyrTEC) 10 MG tablet, Take 1 tablet by mouth Daily., Disp: 30 tablet, Rfl: 2    diclofenac (VOLTAREN) 50 MG EC tablet, Take 1 tablet by mouth 2 (Two) Times a Day As Needed (back pain)., Disp: 180 tablet, Rfl: 1    Diclofenac Sodium (VOLTAREN) 1 % gel gel, Apply  topically to the appropriate area as directed 4 (Four) Times a Day., Disp: 100 g, Rfl: 1    fluticasone  (Flonase) 50 MCG/ACT nasal spray, 2 sprays into the nostril(s) as directed by provider Daily., Disp: 18.2 mL, Rfl: 1    hydrOXYzine (ATARAX) 25 MG tablet, Take 1 tablet by mouth 3 (Three) Times a Day As Needed for Itching., Disp: 30 tablet, Rfl: 0    hydrOXYzine (ATARAX) 25 MG tablet, Take 1 tablet by mouth 3 (Three) Times a Day As Needed for Itching., Disp: 30 tablet, Rfl: 0    lidocaine (LIDODERM) 5 %, Place 1 patch on the skin as directed by provider Daily. Remove & Discard patch within 12 hours or as directed by MD, Disp: 15 each, Rfl: 1    lisinopril-hydrochlorothiazide (PRINZIDE,ZESTORETIC) 20-25 MG per tablet, Take 1 tablet by mouth Daily., Disp: 90 tablet, Rfl: 1    pantoprazole (PROTONIX) 40 MG EC tablet, Take 1 tablet by mouth Daily., Disp: 90 tablet, Rfl: 1    Rimegepant Sulfate (Nurtec) 75 MG tablet dispersible tablet, Take 1 tablet by mouth Every Other Day As Needed (headache)., Disp: 2 tablet, Rfl: 0    triamcinolone (KENALOG) 0.1 % cream, Apply 1 Application topically to the appropriate area as directed 2 (Two) Times a Day., Disp: 80 g, Rfl: 1    aspirin (aspirin) 81 MG EC tablet, Take 1 tablet by mouth Daily. (Patient not taking: Reported on 7/1/2024), Disp: , Rfl:     traMADol (ULTRAM) 50 MG tablet, Take 1 tablet by mouth 3 (Three) Times a Day As Needed for Moderate Pain for up to 7 days., Disp: 21 tablet, Rfl: 0    The following portions of the patient's history were reviewed and updated as appropriate: allergies, current medications, past family history, past medical history, past social history, past surgical history, and problem list.      REVIEW OF PERTINENT MEDICAL DATA    Past Medical History:   Diagnosis Date    Anxiety     Arthritis     Back pain     High cholesterol     Hypertension     Shoulder pain, bilateral      Past Surgical History:   Procedure Laterality Date    TUBAL ABDOMINAL LIGATION       Family History   Problem Relation Age of Onset    COPD Mother     No Known Problems Father       Social History     Socioeconomic History    Marital status: Single   Tobacco Use    Smoking status: Former     Types: Cigarettes    Smokeless tobacco: Never    Tobacco comments:     quit 13 yrs ago   Vaping Use    Vaping status: Never Used   Substance and Sexual Activity    Alcohol use: Yes     Comment: caffeine 1c qd    Drug use: Defer    Sexual activity: Defer         Review of Systems   Musculoskeletal:  Positive for arthralgias and back pain.   Neurological:  Positive for headaches.         Vitals:    07/01/24 1405   BP: 152/83   Pulse: 83   Resp: 16   SpO2: 98%   Weight: 113 kg (249 lb)   PainSc:   7         Objective   Physical Exam  Neck:     Musculoskeletal:         General: Tenderness present.        Legs:    Neurological:      Deep Tendon Reflexes:      Reflex Scores:       Patellar reflexes are 2+ on the right side and 2+ on the left side.       Achilles reflexes are 2+ on the right side and 2+ on the left side.     Comments: Motor strength 5/5 b/l LE  Sensory intact b/l LE               Imaging Reviewed:  Lumbar x-ray-6/24/2024  - Advanced bilateral L5-S1 facet arthropathy with grade 1 anterolisthesis of L5 on S1  - Degenerative sclerotic changes of right SI joint inferiorly    Right shoulder x-ray-2021  - Mild to moderate AC joint osteoarthropathy    Assessment:    1. Lumbar spondylosis    2. High risk medication use    3. Sacroiliac joint dysfunction    4. Occipital neuralgia, unspecified laterality         Plan:   1. New patient visit, urine drug screen per office policy. UDS today to monitor for compliance with medication use. The UDS is medically necessary to monitor for compliance due to the potential side effects and complications of misuse of opioids. UDS done today will review on next visit.   Preliminary UDS was negative for all drugs.  2. We discussed trying a course of formal physical therapy.  Physical therapy can help strengthen and stretch the muscles around the joints. Continue to be  as active as possible.  Patient has done 6 of physical therapy with moderate improvement  3.  Patient has failed meloxicam and diclofenac.  I discussed starting Celebrex, but patient and patient's mother state that Celebrex does not work.  Discussed with patient that I do not recommend chronic opioid for longer period time and will only prescribe temporarily to help control her pain since she has failed multiple medications.  Start tramadol 50 mg TID PRN for #21 tabs. Side effects discussed including but not limited to nausea, vomiting, constipation, lightheadedness, dizziness, drowsiness, or headache. This medication may increase serotonin and rarely cause a very serious condition called serotonin syndrome/ toxicity.   4.  Lumbar x-ray was reviewed with patient showing severe facet arthritis at L5-S1 and sacroiliitis at right side.  I believe patient's pain is multifactorial.  Will proceed with bilateral MBB as her lower back axial pain is significantly higher than pain on the right side below L5 vertebral level.  5. Patient has mainly AXIAL lower back pain. Patient informed they would likely benefit from a medial branch block on bilateral from L4 to Sa.  MRI shows facet arhtirits. Facet tenderness is positive from L4 and Sa. Patient informed the procedure is both therapeutic and diagnostic in nature.  The procedure was described in detail and the risks, benefits and alternatives were discussed with the patient (including but not limited to: bleeding, infection, nerve damage, worsening of pain, CSF leak, inability to perform injection, paralysis, seizures, and death) who agreed to proceed.  Discussed RFA at 70-80 degree for  sec if patient has good relief from 2 diagnostic MBB.    6.  Her migraines are consistent with occipital neuralgia pain.  May consider bilateral lesser occipital nerve blocks in future.  Recommend getting imaging done for head to rule out other causes.      RTC for injection and then 3  week follow up.     Yonas Toussaint DO  Pain Management   [facility]         INSPECT REPORT    As part of the patient's treatment plan, I may be prescribing controlled substances. The patient has been made aware of appropriate use of such medications, including potential risk of somnolence, limited ability to drive and/or work safely, and the potential for dependence or overdose. It has also been made clear that these medications are for use by this patient only, without concomitant use of alcohol or other substances unless prescribed.     Patient has completed prescribing agreement detailing terms of continued prescribing of controlled substances, including monitoring INSPECT reports, urine drug screening, and pill counts if necessary. The patient is aware that inappropriate use will results in cessation of prescribing such medications.    INSPECT report has been reviewed and scanned into the patient's chart.      EMR Dragon/Transcription Disclaimer:   Much of this encounter note is an electronic transcription/translation of spoken language to printed text. The electronic translation of spoken language may permit erroneous, or at times, nonsensical words or phrases to be inadvertently transcribed; Although I have reviewed the note for such errors, some may still exist.

## 2024-07-01 ENCOUNTER — OFFICE VISIT (OUTPATIENT)
Dept: PAIN MEDICINE | Facility: CLINIC | Age: 54
End: 2024-07-01
Payer: MEDICAID

## 2024-07-01 ENCOUNTER — TELEPHONE (OUTPATIENT)
Dept: FAMILY MEDICINE CLINIC | Facility: CLINIC | Age: 54
End: 2024-07-01

## 2024-07-01 VITALS
OXYGEN SATURATION: 98 % | WEIGHT: 249 LBS | SYSTOLIC BLOOD PRESSURE: 152 MMHG | RESPIRATION RATE: 16 BRPM | DIASTOLIC BLOOD PRESSURE: 83 MMHG | BODY MASS INDEX: 38.99 KG/M2 | HEART RATE: 83 BPM

## 2024-07-01 DIAGNOSIS — F41.9 ANXIETY: Primary | ICD-10-CM

## 2024-07-01 DIAGNOSIS — M47.816 LUMBAR SPONDYLOSIS: ICD-10-CM

## 2024-07-01 DIAGNOSIS — M54.81 OCCIPITAL NEURALGIA, UNSPECIFIED LATERALITY: ICD-10-CM

## 2024-07-01 DIAGNOSIS — M53.3 SACROILIAC JOINT DYSFUNCTION: ICD-10-CM

## 2024-07-01 DIAGNOSIS — Z79.899 HIGH RISK MEDICATION USE: Primary | ICD-10-CM

## 2024-07-01 PROCEDURE — 99204 OFFICE O/P NEW MOD 45 MIN: CPT | Performed by: STUDENT IN AN ORGANIZED HEALTH CARE EDUCATION/TRAINING PROGRAM

## 2024-07-01 PROCEDURE — 3077F SYST BP >= 140 MM HG: CPT | Performed by: STUDENT IN AN ORGANIZED HEALTH CARE EDUCATION/TRAINING PROGRAM

## 2024-07-01 PROCEDURE — 3079F DIAST BP 80-89 MM HG: CPT | Performed by: STUDENT IN AN ORGANIZED HEALTH CARE EDUCATION/TRAINING PROGRAM

## 2024-07-01 PROCEDURE — 1125F AMNT PAIN NOTED PAIN PRSNT: CPT | Performed by: STUDENT IN AN ORGANIZED HEALTH CARE EDUCATION/TRAINING PROGRAM

## 2024-07-01 PROCEDURE — 1160F RVW MEDS BY RX/DR IN RCRD: CPT | Performed by: STUDENT IN AN ORGANIZED HEALTH CARE EDUCATION/TRAINING PROGRAM

## 2024-07-01 PROCEDURE — 1159F MED LIST DOCD IN RCRD: CPT | Performed by: STUDENT IN AN ORGANIZED HEALTH CARE EDUCATION/TRAINING PROGRAM

## 2024-07-01 RX ORDER — DIAZEPAM 5 MG/1
5 TABLET ORAL 2 TIMES DAILY PRN
Qty: 10 TABLET | Refills: 0 | Status: SHIPPED | OUTPATIENT
Start: 2024-07-01

## 2024-07-01 RX ORDER — TRAMADOL HYDROCHLORIDE 50 MG/1
50 TABLET ORAL 3 TIMES DAILY PRN
Qty: 21 TABLET | Refills: 0 | Status: SHIPPED | OUTPATIENT
Start: 2024-07-01 | End: 2024-07-08

## 2024-07-01 NOTE — TELEPHONE ENCOUNTER
Caller: Aisha Appiah    Relationship: Self    Best call back number: [phone]     What medication are you requesting: VALUIUM     What are your current symptoms: FOR MRI ON 7/8/24 AND AIRPLANE RIDE ON 7/19/24       If a prescription is needed, what is your preferred pharmacy and phone number: [facility] [id number] - [place], [place] - [address] AT [facility] [address] - [phone]  - [phone] FX     Additional notes:  PATIENT STATES SHE HAD TO RESCHEDULE THE ORIGINAL MRI DUE TO HER FREAKING OUT SO SHE HAS RESCHEDULED AND WAS TOLD TO CONTACT DR OCHOA TO SEE IF SHE CAN GET MEDICATION

## 2024-07-08 ENCOUNTER — HOSPITAL ENCOUNTER (OUTPATIENT)
Dept: MAMMOGRAPHY | Facility: HOSPITAL | Age: 54
Discharge: HOME | End: 2024-07-08
Payer: MEDICAID

## 2024-07-08 ENCOUNTER — HOSPITAL ENCOUNTER (OUTPATIENT)
Dept: CT IMAGING | Facility: HOSPITAL | Age: 54
Discharge: HOME | End: 2024-07-08
Payer: MEDICAID

## 2024-07-08 DIAGNOSIS — Z87.891 FORMER SMOKER: ICD-10-CM

## 2024-07-08 PROCEDURE — 77063 BREAST TOMOSYNTHESIS BI: CPT

## 2024-07-08 PROCEDURE — 71271 CT THORAX LUNG CANCER SCR C-: CPT

## 2024-07-08 PROCEDURE — 77067 SCR MAMMO BI INCL CAD: CPT

## 2024-07-15 ENCOUNTER — TELEPHONE (OUTPATIENT)
Dept: PAIN MEDICINE | Facility: CLINIC | Age: 54
End: 2024-07-15

## 2024-07-15 DIAGNOSIS — M53.3 SACROILIAC JOINT DYSFUNCTION: ICD-10-CM

## 2024-07-15 DIAGNOSIS — M47.816 LUMBAR SPONDYLOSIS: Primary | ICD-10-CM

## 2024-07-15 DIAGNOSIS — I10 PRIMARY HYPERTENSION: ICD-10-CM

## 2024-07-15 DIAGNOSIS — M54.81 OCCIPITAL NEURALGIA, UNSPECIFIED LATERALITY: ICD-10-CM

## 2024-07-15 RX ORDER — HYDROXYZINE HYDROCHLORIDE 25 MG/1
TABLET, FILM COATED ORAL
Qty: 30 TABLET | Refills: 0 | Status: SHIPPED | OUTPATIENT
Start: 2024-07-15

## 2024-07-15 RX ORDER — LISINOPRIL AND HYDROCHLOROTHIAZIDE 25; 20 MG/1; MG/1
1 TABLET ORAL DAILY
Qty: 90 TABLET | Refills: 1 | Status: SHIPPED | OUTPATIENT
Start: 2024-07-15

## 2024-07-15 RX ORDER — TRAMADOL HYDROCHLORIDE 50 MG/1
50 TABLET ORAL 3 TIMES DAILY PRN
Qty: 90 TABLET | Refills: 0 | Status: SHIPPED | OUTPATIENT
Start: 2024-07-15 | End: 2024-08-14

## 2024-07-15 NOTE — TELEPHONE ENCOUNTER
Caller: Aisha Appiah    Relationship: Self    Best call back number: [phone]    Requested Prescriptions:   TRAMADOL     PT REQUESTING MORE TRAMADOL UNTIL SHE HAS PROCEDURE ON 07/30/24.    Pharmacy where request should be sent: [facility] [id number] - [place] [place] - [address] AT [facility] [address] - [phone] [facility] [phone] FX     Last office visit with prescribing clinician: 7/1/2024   Last telemedicine visit with prescribing clinician: Visit date not found   Next office visit with prescribing clinician: 8/19/2024     Does the patient have less than a 3 day supply:  [x] Yes  [] No    Would you like a call back once the refill request has been completed: [x] Yes [] No    If the office needs to give you a call back, can they leave a voicemail: [x] Yes [] No    Blaze Benson   07/15/24 13:01 EDT

## 2024-07-30 ENCOUNTER — HOSPITAL ENCOUNTER (OUTPATIENT)
Dept: PAIN MEDICINE | Facility: HOSPITAL | Age: 54
Discharge: HOME | End: 2024-07-30
Payer: MEDICAID

## 2024-07-30 VITALS
WEIGHT: 249 LBS | HEIGHT: 67 IN | HEART RATE: 68 BPM | SYSTOLIC BLOOD PRESSURE: 143 MMHG | BODY MASS INDEX: 39.08 KG/M2 | OXYGEN SATURATION: 97 % | RESPIRATION RATE: 16 BRPM | DIASTOLIC BLOOD PRESSURE: 91 MMHG | TEMPERATURE: 97.1 F

## 2024-07-30 DIAGNOSIS — R52 PAIN: ICD-10-CM

## 2024-07-30 DIAGNOSIS — M47.816 LUMBAR SPONDYLOSIS: Primary | ICD-10-CM

## 2024-07-30 PROCEDURE — 77003 FLUOROGUIDE FOR SPINE INJECT: CPT

## 2024-07-30 PROCEDURE — 25010000002 METHYLPREDNISOLONE PER 80 MG: Performed by: STUDENT IN AN ORGANIZED HEALTH CARE EDUCATION/TRAINING PROGRAM

## 2024-07-30 PROCEDURE — 64493 INJ PARAVERT F JNT L/S 1 LEV: CPT | Performed by: STUDENT IN AN ORGANIZED HEALTH CARE EDUCATION/TRAINING PROGRAM

## 2024-07-30 PROCEDURE — 25010000002 BUPIVACAINE (PF) 0.25 % SOLUTION: Performed by: STUDENT IN AN ORGANIZED HEALTH CARE EDUCATION/TRAINING PROGRAM

## 2024-07-30 PROCEDURE — 64494 INJ PARAVERT F JNT L/S 2 LEV: CPT | Performed by: STUDENT IN AN ORGANIZED HEALTH CARE EDUCATION/TRAINING PROGRAM

## 2024-07-30 RX ORDER — LIDOCAINE HYDROCHLORIDE 10 MG/ML
5 INJECTION, SOLUTION EPIDURAL; INFILTRATION; INTRACAUDAL; PERINEURAL ONCE
Status: COMPLETED | OUTPATIENT
Start: 2024-07-30 | End: 2024-07-30

## 2024-07-30 RX ORDER — METHYLPREDNISOLONE ACETATE 80 MG/ML
80 INJECTION, SUSPENSION INTRA-ARTICULAR; INTRALESIONAL; INTRAMUSCULAR; SOFT TISSUE ONCE
Status: COMPLETED | OUTPATIENT
Start: 2024-07-30 | End: 2024-07-30

## 2024-07-30 RX ORDER — BUPIVACAINE HYDROCHLORIDE 2.5 MG/ML
10 INJECTION, SOLUTION EPIDURAL; INFILTRATION; INTRACAUDAL ONCE
Status: COMPLETED | OUTPATIENT
Start: 2024-07-30 | End: 2024-07-30

## 2024-07-30 RX ADMIN — BUPIVACAINE HYDROCHLORIDE 10 ML: 2.5 INJECTION, SOLUTION EPIDURAL; INFILTRATION; INTRACAUDAL; PERINEURAL at 13:59

## 2024-07-30 RX ADMIN — METHYLPREDNISOLONE ACETATE 80 MG: 80 INJECTION, SUSPENSION INTRA-ARTICULAR; INTRALESIONAL; INTRAMUSCULAR; SOFT TISSUE at 13:59

## 2024-07-30 RX ADMIN — LIDOCAINE HYDROCHLORIDE 5 ML: 10 INJECTION, SOLUTION EPIDURAL; INFILTRATION; INTRACAUDAL; PERINEURAL at 13:55

## 2024-07-30 NOTE — H&P
H and P reviewed from previous visit and no changes to patient's clinical presentation. Will proceed with procedure as planned. Patient denies history of DM and being on blood thinners.    Yonas Toussaint DO  Pain Management   [facility]

## 2024-07-30 NOTE — PROCEDURES
PROCEDURE:  Bilateral L 4, 5 and SA MBB     INDICATION: Patient complains of pain in the lower back, bilateral.  Pain increases on extension of the spine, facet tenderness present.       PROCEDURE DETAILS:   After obtaining written informed consent patient was taken to the procedure room. Pre-procedure blood pressure and pulse were stable and recorded in patients clinic chart. The patient was placed in a prone position and the lower back was prepped with chloraprep and draped in the usual sterile fashion.  The skin was infiltrated with 1% lidocaine at the junction of transverse process with the vertebral body at the left L 4 level. A 22-gauge, 3.5-inch needle was inserted into the lumbar medial branch under radiographic guidance. Both AP and lateral views were obtained.   Following placement of the needle and negative aspiration, 1.2 cc mixture of bupivacaine 0.25% with depo-medrol was injected  The identical procedure was performed on left L5 and SA medial branch was repeated on right side at L4, L5, Sa.  A total of 9 cc of 0.25% bupivacaine with 80 mg of Depo-medrol was injected. During the procedure there was no evidence of CSF, paresthesias or heme.    After the procedure the needles were removed. Skin was cleaned and a sterile dressing was applied.    Following the procedure the patient's vital signs were stable. The patient was discharged home in good condition after being given discharge instructions.    COMPLICATIONS None    Yonas Toussaint DO  Pain Management   [facility]      Pain Refusal Text: I offered to prescribe pain medication but the patient refused to take this medication.

## 2024-07-31 ENCOUNTER — TELEPHONE (OUTPATIENT)
Dept: PAIN MEDICINE | Facility: HOSPITAL | Age: 54
End: 2024-07-31
Payer: MEDICAID

## 2024-07-31 NOTE — TELEPHONE ENCOUNTER
Post procedure phone call completed.  Pt states they are doing good and denies questions or concerns.  Patient called back and reports pain level a #2 with 80% pain relief from procedure.

## 2024-08-06 ENCOUNTER — TELEPHONE (OUTPATIENT)
Dept: PAIN MEDICINE | Facility: CLINIC | Age: 54
End: 2024-08-06
Payer: MEDICAID

## 2024-08-06 DIAGNOSIS — M47.816 LUMBAR SPONDYLOSIS: ICD-10-CM

## 2024-08-06 DIAGNOSIS — M53.3 SACROILIAC JOINT DYSFUNCTION: ICD-10-CM

## 2024-08-06 DIAGNOSIS — M54.81 OCCIPITAL NEURALGIA, UNSPECIFIED LATERALITY: ICD-10-CM

## 2024-08-06 RX ORDER — TRAMADOL HYDROCHLORIDE 50 MG/1
50 TABLET ORAL 3 TIMES DAILY PRN
Qty: 90 TABLET | Refills: 0 | Status: SHIPPED | OUTPATIENT
Start: 2024-08-06 | End: 2024-09-05

## 2024-08-06 NOTE — TELEPHONE ENCOUNTER
Patient requesting Tramadol. She states the procedure helped some but states her pain is aggravated while at work and needs medication to help with that.

## 2024-08-06 NOTE — TELEPHONE ENCOUNTER
Caller: Aisha Appiah    Relationship: Self    Best call back number: [phone]    Requested Prescriptions:   Requested Prescriptions     Pending Prescriptions Disp Refills    traMADol (ULTRAM) 50 MG tablet 90 tablet 0     Sig: Take 1 tablet by mouth 3 (Three) Times a Day As Needed for Moderate Pain for up to 30 days.        Pharmacy where request should be sent: [facility] [id number] - [place], [place] - [address] AT [place] [address] - [phone] [facility] [phone]      Last office visit with prescribing clinician: 7/1/2024   Last telemedicine visit with prescribing clinician: Visit date not found   Next office visit with prescribing clinician: 8/19/2024     Additional details provided by patient:     Does the patient have less than a 3 day supply:  [x] Yes  [] No    Would you like a call back once the refill request has been completed: [x] Yes [] No    If the office needs to give you a call back, can they leave a voicemail: [x] Yes [] No    Hernandez Jacques   08/06/24 14:13 EDT

## 2024-08-15 NOTE — PROGRESS NOTES
Tyesha Appiah is a 53 y.o. female is here for follow up for lower back pain. Patient was last seen on 7/30/2024 for bilateral MBB L4-SA    On last visit: Started tramadol - takes it rarely due to improved pain.     After procedure:   Lower back pain is 1/10 on VAS.     Before procedure:   Lower back pain is 7/10 on VAS, at maximum is 10/10. Pain is aching, pressure, throbbing, burning, sharp, tingling, soreness, spasms, numbness, stabbing in nature. Pain is referred right buttock, right posterior thigh; burning feeling in R buttock. The pain is constant. The pain is improved by tylenol PM. The pain is worse with any increased activities, working and being in 1 position with sitting or standing.  Affecting her sleep and ability to work.     Chronic mostly daily migraines in the base of her neck radiating to top of her head.  Scheduled for MRI of her head.    Previous Injection:   7/30/2024-B/L MBB L4-SA- 95% pain relief. Able to get better sleep, work and have increased activities without being in pain. VAS score down from 7/10 to 1/10.     Hx: Referred by Dr. Beaulieu, Ariane Goetz MD for lower back pain. She had chronic lower back pain for long time but significantly worsened with being constant for last 1 month. She has been seeing orthopedics for bilateral knee gel and steroids injections and bilateral shoulder pain.  She was diagnosed with strain of deltoid muscle in 2021 by Dr. Solis and was recommended biomed cream. She has tried multiple NSAIDs, tylenol, lidoderm patches, baclofen without much relief.        PHQ-9- 10                     SOAPP- 2  [place] back disability scale - 56     PMH:   Depression, hypertension, bilateral knee pain     Current Medications:   Baclofen 10 mg  Diclofenac 50 mg  Voltaren 1% gel  Lidoderm patches 5%  Nurtec        Past Medications:  Meloxicam didn't help  Diclofeanc        Past Modalities:  TENS:                                                                           no                                                  Physical Therapy Within The Last 6 Months              Yes- [facility] - 2 months finished early 2024. Some relief; she has been doing home exercises daily.   Psychotherapy                                                            no  Massage Therapy                                                       no     Patient Complains Of:  Uro-Fecal Incontinence          no  Weight Gain/Loss                   no  Fever/Chills                             no  Weakness                               no        PEG Assessment   What number best describes your pain on average in the past week?7  What number best describes how, during the past week, pain has interfered with your enjoyment of life?7  What number best describes how, during the past week, pain has interfered with your general activity?  7      Current Outpatient Medications:     aspirin (aspirin) 81 MG EC tablet, Take 1 tablet by mouth Daily., Disp: , Rfl:     atorvastatin (LIPITOR) 20 MG tablet, Take 1 tablet by mouth Every Night., Disp: 90 tablet, Rfl: 1    baclofen (LIORESAL) 10 MG tablet, Take 1 tablet by mouth 3 (Three) Times a Day., Disp: 30 tablet, Rfl: 0    cetirizine (zyrTEC) 10 MG tablet, Take 1 tablet by mouth Daily., Disp: 30 tablet, Rfl: 2    diazePAM (Valium) 5 MG tablet, Take 1 tablet by mouth 2 (Two) Times a Day As Needed for Anxiety., Disp: 10 tablet, Rfl: 0    diclofenac (VOLTAREN) 50 MG EC tablet, Take 1 tablet by mouth 2 (Two) Times a Day As Needed (back pain)., Disp: 180 tablet, Rfl: 1    Diclofenac Sodium (VOLTAREN) 1 % gel gel, Apply  topically to the appropriate area as directed 4 (Four) Times a Day., Disp: 100 g, Rfl: 1    fluticasone (Flonase) 50 MCG/ACT nasal spray, 2 sprays into the nostril(s) as directed by provider Daily., Disp: 18.2 mL, Rfl: 1    hydrOXYzine (ATARAX) 25 MG tablet, Take 1 tablet by mouth 3 (Three) Times a Day As Needed for Itching., Disp: 30 tablet, Rfl: 0     hydrOXYzine (ATARAX) 25 MG tablet, TAKE 1 TABLET BY MOUTH 3 TIMES A DAY AS NEEDED FOR ITCHING, Disp: 30 tablet, Rfl: 0    lidocaine (LIDODERM) 5 %, Place 1 patch on the skin as directed by provider Daily. Remove & Discard patch within 12 hours or as directed by MD, Disp: 15 each, Rfl: 1    lisinopril-hydrochlorothiazide (PRINZIDE,ZESTORETIC) 20-25 MG per tablet, TAKE 1 TABLET BY MOUTH DAILY, Disp: 90 tablet, Rfl: 1    pantoprazole (PROTONIX) 40 MG EC tablet, Take 1 tablet by mouth Daily., Disp: 90 tablet, Rfl: 1    Rimegepant Sulfate (Nurtec) 75 MG tablet dispersible tablet, Take 1 tablet by mouth Every Other Day As Needed (headache)., Disp: 2 tablet, Rfl: 0    traMADol (ULTRAM) 50 MG tablet, Take 1 tablet by mouth 3 (Three) Times a Day As Needed for Moderate Pain for up to 30 days., Disp: 90 tablet, Rfl: 0    triamcinolone (KENALOG) 0.1 % cream, Apply 1 Application topically to the appropriate area as directed 2 (Two) Times a Day., Disp: 80 g, Rfl: 1    The following portions of the patient's history were reviewed and updated as appropriate: allergies, current medications, past family history, past medical history, past social history, past surgical history, and problem list.      REVIEW OF PERTINENT MEDICAL DATA    Past Medical History:   Diagnosis Date    Anxiety     Arthritis     Back pain     High cholesterol     Hypertension     Shoulder pain, bilateral      Past Surgical History:   Procedure Laterality Date    TUBAL ABDOMINAL LIGATION       Family History   Problem Relation Age of Onset    COPD Mother     No Known Problems Father      Social History     Socioeconomic History    Marital status: Single   Tobacco Use    Smoking status: Former     Types: Cigarettes    Smokeless tobacco: Never    Tobacco comments:     quit 13 yrs ago   Vaping Use    Vaping status: Never Used   Substance and Sexual Activity    Alcohol use: Yes     Comment: caffeine 1c qd    Drug use: Defer    Sexual activity: Defer         Review  of Systems   Musculoskeletal:  Positive for arthralgias and back pain.   Neurological:  Positive for headaches.         Vitals:    08/19/24 1041   BP: 146/87   Pulse: 86   Resp: 16   SpO2: 98%   Weight: 110 kg (243 lb)   PainSc:   1         Objective   Physical Exam  Neck:     Musculoskeletal:         General: Tenderness present.        Legs:    Neurological:      Deep Tendon Reflexes:      Reflex Scores:       Patellar reflexes are 2+ on the right side and 2+ on the left side.       Achilles reflexes are 2+ on the right side and 2+ on the left side.     Comments: Motor strength 5/5 b/l LE  Sensory intact b/l LE               Imaging Reviewed:  Lumbar x-ray-6/24/2024  - Advanced bilateral L5-S1 facet arthropathy with grade 1 anterolisthesis of L5 on S1  - Degenerative sclerotic changes of right SI joint inferiorly     Right shoulder x-ray-2021  - Mild to moderate AC joint osteoarthropathy    Assessment:    1. Lumbar spondylosis    2. Occipital neuralgia, unspecified laterality         Plan:   1.  UDS on 7/1/2024 is consistent with patient's interview and negative for any drugs not prescribed.  Narcotic contract was signed on 7/1/2024.  2. We discussed trying a course of formal physical therapy.  Physical therapy can help strengthen and stretch the muscles around the joints. Continue to be as active as possible.  Patient has done 6 of physical therapy with moderate improvement  3.  Patient has failed meloxicam and diclofenac.  STOP tramadol has pain is better.   4.  Lumbar x-ray was reviewed with patient showing severe facet arthritis at L5-S1 and sacroiliitis at right side.  I believe patient's pain is multifactorial.  Will proceed with bilateral MBB as her lower back axial pain is significantly higher than pain on the right side below L5 vertebral level.  5. Excellent relief with b/l MBB. Repeat second block when pain returns and subsequently RFA.  6.  Her migraines are consistent with occipital neuralgia pain.   May consider bilateral lesser occipital nerve blocks in future.  Recommend getting imaging done for head to rule out other causes.        RTC PRN.     Yonas Toussaint DO  Pain Management   [facility]       INSPECT REPORT    As part of the patient's treatment plan, I may be prescribing controlled substances. The patient has been made aware of appropriate use of such medications, including potential risk of somnolence, limited ability to drive and/or work safely, and the potential for dependence or overdose. It has also been made clear that these medications are for use by this patient only, without concomitant use of alcohol or other substances unless prescribed.     Patient has completed prescribing agreement detailing terms of continued prescribing of controlled substances, including monitoring INSPECT reports, urine drug screening, and pill counts if necessary. The patient is aware that inappropriate use will results in cessation of prescribing such medications.    INSPECT report has been reviewed and scanned into the patient's chart.

## 2024-08-19 ENCOUNTER — OFFICE VISIT (OUTPATIENT)
Dept: PAIN MEDICINE | Facility: CLINIC | Age: 54
End: 2024-08-19
Payer: MEDICAID

## 2024-08-19 VITALS
BODY MASS INDEX: 38.06 KG/M2 | RESPIRATION RATE: 16 BRPM | SYSTOLIC BLOOD PRESSURE: 146 MMHG | OXYGEN SATURATION: 98 % | HEART RATE: 86 BPM | DIASTOLIC BLOOD PRESSURE: 87 MMHG | WEIGHT: 243 LBS

## 2024-08-19 DIAGNOSIS — M54.81 OCCIPITAL NEURALGIA, UNSPECIFIED LATERALITY: ICD-10-CM

## 2024-08-19 DIAGNOSIS — M47.816 LUMBAR SPONDYLOSIS: Primary | ICD-10-CM

## 2024-08-19 PROCEDURE — 1160F RVW MEDS BY RX/DR IN RCRD: CPT | Performed by: STUDENT IN AN ORGANIZED HEALTH CARE EDUCATION/TRAINING PROGRAM

## 2024-08-19 PROCEDURE — 3077F SYST BP >= 140 MM HG: CPT | Performed by: STUDENT IN AN ORGANIZED HEALTH CARE EDUCATION/TRAINING PROGRAM

## 2024-08-19 PROCEDURE — 99213 OFFICE O/P EST LOW 20 MIN: CPT | Performed by: STUDENT IN AN ORGANIZED HEALTH CARE EDUCATION/TRAINING PROGRAM

## 2024-08-19 PROCEDURE — 3079F DIAST BP 80-89 MM HG: CPT | Performed by: STUDENT IN AN ORGANIZED HEALTH CARE EDUCATION/TRAINING PROGRAM

## 2024-08-19 PROCEDURE — 1125F AMNT PAIN NOTED PAIN PRSNT: CPT | Performed by: STUDENT IN AN ORGANIZED HEALTH CARE EDUCATION/TRAINING PROGRAM

## 2024-08-19 PROCEDURE — 1159F MED LIST DOCD IN RCRD: CPT | Performed by: STUDENT IN AN ORGANIZED HEALTH CARE EDUCATION/TRAINING PROGRAM

## 2024-08-31 ENCOUNTER — HOSPITAL ENCOUNTER (EMERGENCY)
Facility: HOSPITAL | Age: 54
Discharge: LEFT BEFORE DISCHARGE | End: 2024-08-31
Attending: EMERGENCY MEDICINE | Admitting: EMERGENCY MEDICINE
Payer: MEDICAID

## 2024-08-31 PROCEDURE — 99211 OFF/OP EST MAY X REQ PHY/QHP: CPT

## 2024-09-09 ENCOUNTER — TELEPHONE (OUTPATIENT)
Dept: PAIN MEDICINE | Facility: CLINIC | Age: 54
End: 2024-09-09
Payer: MEDICAID

## 2024-09-09 DIAGNOSIS — M47.816 LUMBAR SPONDYLOSIS: Primary | ICD-10-CM

## 2024-09-09 NOTE — TELEPHONE ENCOUNTER
Caller: LO   Relationship to Patient: SELF  Phone Number: [phone] (home)     Reason for Call:   PATIENT READY TO SCHEDULE ANOTHER MEDIAL BRANCH BLOCK STATES HER LOW BACK PAIN IS COMING BACK

## 2024-09-23 ENCOUNTER — TELEPHONE (OUTPATIENT)
Dept: PAIN MEDICINE | Facility: CLINIC | Age: 54
End: 2024-09-23
Payer: MEDICAID

## 2024-09-26 ENCOUNTER — OFFICE VISIT (OUTPATIENT)
Dept: PAIN MEDICINE | Facility: CLINIC | Age: 54
End: 2024-09-26
Payer: MEDICAID

## 2024-09-26 VITALS
RESPIRATION RATE: 16 BRPM | WEIGHT: 246 LBS | BODY MASS INDEX: 38.53 KG/M2 | OXYGEN SATURATION: 97 % | SYSTOLIC BLOOD PRESSURE: 147 MMHG | HEART RATE: 80 BPM | DIASTOLIC BLOOD PRESSURE: 95 MMHG

## 2024-09-26 DIAGNOSIS — M53.3 SACROILIAC JOINT DYSFUNCTION: ICD-10-CM

## 2024-09-26 DIAGNOSIS — M47.816 LUMBAR SPONDYLOSIS: Primary | ICD-10-CM

## 2024-09-26 DIAGNOSIS — Z79.899 HIGH RISK MEDICATION USE: ICD-10-CM

## 2024-09-26 RX ORDER — TRAMADOL HYDROCHLORIDE 50 MG/1
50 TABLET ORAL EVERY 6 HOURS PRN
Qty: 56 TABLET | Refills: 0 | Status: SHIPPED | OUTPATIENT
Start: 2024-09-26 | End: 2024-10-10

## 2024-09-26 RX ORDER — METHYLPREDNISOLONE 4 MG
TABLET, DOSE PACK ORAL
Qty: 21 TABLET | Refills: 0 | Status: SHIPPED | OUTPATIENT
Start: 2024-09-26

## 2024-10-07 ENCOUNTER — TELEPHONE (OUTPATIENT)
Dept: PAIN MEDICINE | Facility: CLINIC | Age: 54
End: 2024-10-07
Payer: MEDICAID

## 2024-10-07 NOTE — TELEPHONE ENCOUNTER
Caller: Aisha Appiah    Relationship to patient: Self    Best call back number: [phone] (home)     Patient is needing: PATIENT WANTS TO GET LOWER BACK INJECTIONS. AND GET PRESCRIBED PAIN PATCHES.   THE MEDICATION SHE WAS BEEN PRESCRIBED HAS NOT BEEN WORKING RECENTLY.   PROCEDURE WAS RECENTLY DENIED AND SHE NEEDS US TO APPEAL

## 2024-10-08 ENCOUNTER — TELEPHONE (OUTPATIENT)
Dept: PAIN MEDICINE | Facility: CLINIC | Age: 54
End: 2024-10-08
Payer: MEDICAID

## 2024-10-08 NOTE — TELEPHONE ENCOUNTER
This is an appeal for the denial for requested second diagnostic bilateral medial branch blocks L4-5, L5-SA.   Patient has been suffering severe axial lower back pain for which she underwent bilateral MBB L4-SA on 7/30/2024 with 0.25% bupivacaine and 40 mg of Depo-Medrol. Patient reported greater than 80% pain relief with VAS scores down from 7/10 to 1/10 immediately after procedure lasting for about 15 hours while bupivacaine lasted. Depo-Medrol was used to provide some temporary prolonged relief until patient gets a second diagnostic medial branch block. This medial branch block lasted for about 1 month and patient had significant pain relief with functional improvement where she was able to increase her activity, her sleep was better and she was able to get back to her work. Before the procedure patient was started on tramadol due to severe pain and patient was also able to stop tramadol after the procedure. Insurance denial letter states that physician's note does not mention that patient had greater than 80% pain relief although all my notes have noted that patient had greater than 95% pain relief. Due to denial of second diagnostic block we will be unable to do RFA and patient's pain is returned to 7/10 to 10/10 as before. Patient is back on opioids and even tramadol is not helping with her pain. We are requesting to review this case and approve this procedure so patient can have long-term relief with subsequent RFA after second diagnostic block.    Yonas Toussaint DO  Pain Management   [facility]

## 2024-10-08 NOTE — TELEPHONE ENCOUNTER
This is an appeal for the denial for requested second diagnostic bilateral medial branch blocks L4-5, L5-SA.   Patient has been suffering severe axial lower back pain for which she underwent bilateral MBB L4-SA on 7/30/2024 with 0.25% bupivacaine and 40 mg of Depo-Medrol.  Patient reported greater than 80% pain relief with VAS scores down from 7/10 to 1/10 immediately after procedure lasting for about 15 hours while bupivacaine lasted.  Depo-Medrol was used to provide some temporary prolonged relief until patient gets a second diagnostic medial branch block.  This medial branch block lasted for about 1 month and patient had significant pain relief with functional improvement where she was able to increase her activity, her sleep was better and she was able to get back to her work.  Before the procedure patient was started on tramadol due to severe pain and patient was also able to stop tramadol after the procedure.  Insurance denial letter states that physician's note does not mention that patient had greater than 80% pain relief although all my notes have noted that patient had greater than 95% pain relief.  Due to denial of second diagnostic block we will be unable to do RFA and patient's pain is returned to 7/10 to 10/10 as before.  Patient is back on opioids and even tramadol is not helping with her pain.  We are requesting to review this case and approve this procedure so patient can have long-term relief with subsequent RFA after second diagnostic block.    Yonas Toussaint DO  Pain Management   [facility]

## 2024-10-15 ENCOUNTER — TELEPHONE (OUTPATIENT)
Dept: FAMILY MEDICINE CLINIC | Facility: CLINIC | Age: 54
End: 2024-10-15
Payer: MEDICAID

## 2024-10-15 DIAGNOSIS — Z12.83 SCREENING FOR SKIN CANCER: Primary | ICD-10-CM

## 2024-10-15 NOTE — TELEPHONE ENCOUNTER
PT WOULD LIKE TO GET A REFERRAL TO DERMATOLOGY. SHE SAID SHE SPOKE TO YOU ABOUT IT A LONG TIME AGO AND SHE NEVER WENT.    THANKS

## 2024-10-18 ENCOUNTER — TELEPHONE (OUTPATIENT)
Dept: FAMILY MEDICINE CLINIC | Facility: CLINIC | Age: 54
End: 2024-10-18
Payer: MEDICAID

## 2024-10-18 ENCOUNTER — TELEPHONE (OUTPATIENT)
Dept: PAIN MEDICINE | Facility: CLINIC | Age: 54
End: 2024-10-18
Payer: MEDICAID

## 2024-10-18 DIAGNOSIS — I10 PRIMARY HYPERTENSION: ICD-10-CM

## 2024-10-18 DIAGNOSIS — E78.5 HYPERLIPIDEMIA, UNSPECIFIED HYPERLIPIDEMIA TYPE: ICD-10-CM

## 2024-10-18 DIAGNOSIS — G89.29 CHRONIC BILATERAL LOW BACK PAIN WITHOUT SCIATICA: ICD-10-CM

## 2024-10-18 DIAGNOSIS — M47.816 LUMBAR SPONDYLOSIS: Primary | ICD-10-CM

## 2024-10-18 DIAGNOSIS — M54.50 CHRONIC BILATERAL LOW BACK PAIN WITHOUT SCIATICA: ICD-10-CM

## 2024-10-18 RX ORDER — ATORVASTATIN CALCIUM 20 MG/1
20 TABLET, FILM COATED ORAL NIGHTLY
Qty: 90 TABLET | Refills: 1 | Status: SHIPPED | OUTPATIENT
Start: 2024-10-18

## 2024-10-18 RX ORDER — LISINOPRIL AND HYDROCHLOROTHIAZIDE 20; 25 MG/1; MG/1
1 TABLET ORAL DAILY
Qty: 90 TABLET | Refills: 1 | Status: SHIPPED | OUTPATIENT
Start: 2024-10-18

## 2024-10-18 RX ORDER — TRAMADOL HYDROCHLORIDE 50 MG/1
50 TABLET ORAL 3 TIMES DAILY PRN
Qty: 42 TABLET | Refills: 0 | Status: SHIPPED | OUTPATIENT
Start: 2024-10-18 | End: 2024-11-01

## 2024-10-18 NOTE — TELEPHONE ENCOUNTER
NO ANSWER AND THE MAIL BOX WAS FULL I WAS UNABLE TO LEAVE A VM     IF THE PT CALLS BACK     HUB OK TO RELAY     WHAT OTHER MEDICATIONS SHE NEEDS REFILLED
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
## 2024-10-21 RX ORDER — LIDOCAINE 50 MG/G
1 PATCH TOPICAL EVERY 24 HOURS
Qty: 15 EACH | Refills: 1 | Status: SHIPPED | OUTPATIENT
Start: 2024-10-21

## 2024-10-21 NOTE — TELEPHONE ENCOUNTER
PATIENT CALLED BACK, SHARED INFO. SHE IS REQUESTING     lidocaine (LIDODERM) 5 %     [facility] [id number] - [place], [place] - [address] AT [facility] [address] - [phone]  - [phone]  [phone]     CALL BACK NUMBER [phone]

## 2024-10-28 ENCOUNTER — TELEPHONE (OUTPATIENT)
Dept: FAMILY MEDICINE CLINIC | Facility: CLINIC | Age: 54
End: 2024-10-28
Payer: MEDICAID

## 2024-10-28 ENCOUNTER — TELEPHONE (OUTPATIENT)
Dept: PAIN MEDICINE | Facility: CLINIC | Age: 54
End: 2024-10-28
Payer: MEDICAID

## 2024-10-28 RX ORDER — METHYLPREDNISOLONE 4 MG/1
TABLET ORAL
Qty: 21 TABLET | Refills: 0 | Status: SHIPPED | OUTPATIENT
Start: 2024-10-28
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
## 2024-10-28 NOTE — TELEPHONE ENCOUNTER
Caller: Aisha Appiah    Relationship: Self    Best call back number: [phone]         What specialty or service is being requested: DERMATOLOGIST     What is the provider, practice or medical service name: [facility]    What is the office location: [address] [place] [address]     What is the office phone number: [phone]    Any additional details: PLEASE CALL PATIENT AS SOON AS THIS IS SENT OVER THERE.     THE FIRST REFERRAL THAT WAS PLACED WAS FOR A PLACE THAT DIDN'T TAKE PATIENTS INSURANCE.    PLEASE CALL WITH ANY ADDITIONAL QUESTIONS AND OR UPDATES.

## 2024-10-28 NOTE — TELEPHONE ENCOUNTER
Caller: LO    Relationship: SELF    Best call back number: [phone]    Requested Prescriptions:   Requested Prescriptions     Pending Prescriptions Disp Refills    methylPREDNISolone (MEDROL) 4 MG dose pack 21 tablet 0     Sig: Take as directed on package instructions.        Pharmacy where request should be sent:    [facility] [id number] - [place], [place] - [address] AT    Last office visit with prescribing clinician: 9/26/2024   Last telemedicine visit with prescribing clinician: Visit date not found   Next office visit with prescribing clinician: Visit date not found     Additional details provided by patient: MEDICATION WAS STOLEN- SHE WAS ONLY ABLE TO TAKE 1 DAY     Does the patient have less than a 3 day supply:  [x] Yes  [] No    Would you like a call back once the refill request has been completed: [x] Yes [] No    If the office needs to give you a call back, can they leave a voicemail: [x] Yes [] No    Hernandez Christy Rep   10/28/24 13:34 EDT

## 2024-10-28 NOTE — TELEPHONE ENCOUNTER
Caller: LO    Relationship to patient: SELF    Best call back number: [phone]    Type of visit: WANTS TO SEE WHERE INSURANCE APPEAL 10/8/24 IS AT- SHE WOULD LIKE TO RESCHEDULE PROCEDURE- PLEASE CALL

## 2024-10-29 NOTE — TELEPHONE ENCOUNTER
CALLED AND SPOKE WITH PT    THE [facility] DOESN'T TAKE HER INSURANCE AS WELL.  I TOLD HER THE CLOSES WOULD BE [place] FOR DERMATOLOGY.  I WENT AHEAD AND SENT TO DR. SERRATO'S OFFICE JUST SEE WHAT THEY COME BACK WITH.   SHE VOICED UNDERSTANDING

## 2024-11-07 ENCOUNTER — TELEPHONE (OUTPATIENT)
Dept: PAIN MEDICINE | Facility: CLINIC | Age: 54
End: 2024-11-07
Payer: MEDICAID

## 2024-11-07 DIAGNOSIS — M47.816 LUMBAR SPONDYLOSIS: Primary | ICD-10-CM

## 2024-11-07 RX ORDER — TRAMADOL HYDROCHLORIDE 50 MG/1
50 TABLET ORAL EVERY 8 HOURS PRN
Qty: 42 TABLET | Refills: 0 | Status: SHIPPED | OUTPATIENT
Start: 2024-11-07 | End: 2024-11-21

## 2024-11-07 NOTE — TELEPHONE ENCOUNTER
Caller: Aisha Appiah    Relationship: Self    Best call back number: [phone]    Requested Prescriptions: TRAMADOL      Pharmacy where request should be sent: [facility] [id number] - [place], [place] - [address] AT [facility] [address] - [phone]  - [phone]  [phone]      Last office visit with prescribing clinician: 9/26/2024   Last telemedicine visit with prescribing clinician: Visit date not found   Next office visit with prescribing clinician: Visit date not found     Additional details provided by patient: N/A    Does the patient have less than a 3 day supply:  [x] Yes  [] No    Would you like a call back once the refill request has been completed: [x] Yes [] No    If the office needs to give you a call back, can they leave a voicemail: [x] Yes [] No    Luis Head   11/07/24 11:57 EST

## 2024-11-19 ENCOUNTER — HOSPITAL ENCOUNTER (OUTPATIENT)
Dept: PAIN MEDICINE | Facility: HOSPITAL | Age: 54
Discharge: HOME | End: 2024-11-19
Payer: MEDICAID

## 2024-11-19 VITALS
DIASTOLIC BLOOD PRESSURE: 92 MMHG | WEIGHT: 246 LBS | RESPIRATION RATE: 16 BRPM | HEART RATE: 76 BPM | HEIGHT: 67 IN | TEMPERATURE: 97.3 F | OXYGEN SATURATION: 98 % | SYSTOLIC BLOOD PRESSURE: 152 MMHG | BODY MASS INDEX: 38.61 KG/M2

## 2024-11-19 DIAGNOSIS — M47.816 LUMBAR SPONDYLOSIS: Primary | ICD-10-CM

## 2024-11-19 DIAGNOSIS — R52 PAIN: ICD-10-CM

## 2024-11-19 PROCEDURE — 64493 INJ PARAVERT F JNT L/S 1 LEV: CPT | Performed by: STUDENT IN AN ORGANIZED HEALTH CARE EDUCATION/TRAINING PROGRAM

## 2024-11-19 PROCEDURE — 25010000002 BUPIVACAINE (PF) 0.25 % SOLUTION: Performed by: STUDENT IN AN ORGANIZED HEALTH CARE EDUCATION/TRAINING PROGRAM

## 2024-11-19 PROCEDURE — 25010000002 LIDOCAINE PF 1% 1 % SOLUTION: Performed by: STUDENT IN AN ORGANIZED HEALTH CARE EDUCATION/TRAINING PROGRAM

## 2024-11-19 PROCEDURE — 64494 INJ PARAVERT F JNT L/S 2 LEV: CPT | Performed by: STUDENT IN AN ORGANIZED HEALTH CARE EDUCATION/TRAINING PROGRAM

## 2024-11-19 PROCEDURE — 77003 FLUOROGUIDE FOR SPINE INJECT: CPT

## 2024-11-19 RX ORDER — BUPIVACAINE HYDROCHLORIDE 2.5 MG/ML
10 INJECTION, SOLUTION EPIDURAL; INFILTRATION; INTRACAUDAL ONCE
Status: COMPLETED | OUTPATIENT
Start: 2024-11-19 | End: 2024-11-19

## 2024-11-19 RX ORDER — LIDOCAINE HYDROCHLORIDE 10 MG/ML
5 INJECTION, SOLUTION EPIDURAL; INFILTRATION; INTRACAUDAL; PERINEURAL ONCE
Status: COMPLETED | OUTPATIENT
Start: 2024-11-19 | End: 2024-11-19

## 2024-11-19 RX ADMIN — LIDOCAINE HYDROCHLORIDE 5 ML: 10 INJECTION, SOLUTION EPIDURAL; INFILTRATION; INTRACAUDAL; PERINEURAL at 15:01

## 2024-11-19 RX ADMIN — BUPIVACAINE HYDROCHLORIDE 10 ML: 2.5 INJECTION, SOLUTION EPIDURAL; INFILTRATION; INTRACAUDAL; PERINEURAL at 15:03

## 2024-11-19 NOTE — PROCEDURES
PROCEDURE:  Bilateral L 4, 5 and SA MBB diagnostic local only diagnostic #2    INDICATION: Patient complains of pain in the lower back, bilateral.  Pain increases on extension of the spine, facet tenderness present.       PROCEDURE DETAILS:   After obtaining written informed consent patient was taken to the procedure room. Pre-procedure blood pressure and pulse were stable and recorded in patients clinic chart. The patient was placed in a prone position and the lower back was prepped with chloraprep and draped in the usual sterile fashion.  The skin was infiltrated with 1% lidocaine at the junction of transverse process with the vertebral body at the left L 4 level. A 22-gauge, 3.5-inch needle was inserted into the lumbar medial branch under radiographic guidance. Both AP and lateral views were obtained.   Following placement of the needle and negative aspiration, 1.2 cc of bupivacaine 0.25%.  The identical procedure was performed on left L5 and SA medial branch.  Identical procedure was repeated on right side at L4, L5, Sa.  A total of 10 cc of 0.25% bupivacaine was injected. During the procedure there was no evidence of CSF, paresthesias or heme.    After the procedure the needles were removed. Skin was cleaned and a sterile dressing was applied.    Following the procedure the patient's vital signs were stable. The patient was discharged home in good condition after being given discharge instructions.    COMPLICATIONS None    Diagnostic #2 results: 100% pain relief post procedure with functional improvement. Will proceed with RFA.    Yonas Toussaint DO  Pain Management   [facility]

## 2024-11-19 NOTE — H&P
H and P reviewed from previous visit and no changes to patient's clinical presentation. Will proceed with procedure as planned. Patient denies history of DM and being on blood thinners.  Diagnostic #2    Yonas Toussaint DO  Pain Management   [facility]

## 2024-11-20 ENCOUNTER — TELEPHONE (OUTPATIENT)
Dept: PAIN MEDICINE | Facility: HOSPITAL | Age: 54
End: 2024-11-20
Payer: MEDICAID

## 2024-11-20 NOTE — TELEPHONE ENCOUNTER
Post procedure phone call completed.  Pt states they are doing good and denies questions or concerns. Reported having no pain, 95% relief from procedure.

## 2024-11-27 ENCOUNTER — TELEPHONE (OUTPATIENT)
Dept: PAIN MEDICINE | Facility: CLINIC | Age: 54
End: 2024-11-27
Payer: MEDICAID

## 2024-11-27 NOTE — TELEPHONE ENCOUNTER
Second injection was done to confirm that pain is originating from those levels and not supposed to last more than 1 to 2 days.  She is already scheduled for radiofrequency ablation that gives for longer period time.  What she is describing sounds like sacroiliac joint pain for which we may need separate injection.  I would recommend going to radiofrequency ablation as scheduled and if she continues to have left-sided pain we may consider different type of injections.  Meanwhile, I recommend continuing back patches and tramadol for now.     Yonas Toussaint DO  Pain Management   [facility]

## 2024-11-27 NOTE — TELEPHONE ENCOUNTER
Hub staff attempted to follow warm transfer process and was unsuccessful     Caller: Aisha Appiah    Relationship to patient: Self    Best call back number: [phone]    Patient is needing: PATIENT HAD A PROCEDURE 11-19-24 AND STATES THAT SHE IS NOW HAVING LEFT BUTTOCK PAIN (WHERE THE BACK MEETS THE BUTTOCK) WHICH SHE SAYS IS LIKE A TOOTHACHE. SHE IS ALSO HAVING BURNING (UNDER THE CHEEK ARE) AND SHOOTING PAINS INTO HER LEFT LET. SHE IS HAVING TROUBLE SITTING ON HER LEFT SIDE DUE TO THIS AND WOULD LIKE TO KNOW IF THIS IS NORMAL OR IF THERE IS SOMETHING THAT CAN BE RECOMMENDED TO HELP WITH THIS PAIN. SHE IS USING BACK PAIN PATCHES ON HER LEFT BUTTOCK WITH LITTLE RELIEF. PLEASE CALL TO DISCUSS. OKAY TO LEAVE A VOICEMAIL

## 2024-11-27 NOTE — TELEPHONE ENCOUNTER
Patient states that the RFA is a month away and she can't stand the pain for that long. The patches and tramadol only give a little relief that doesn't last long.She has not been using heat or ice. She would like to know if you have any other recommendations for her to get more relief. She asked if there was a different patch she could use. She also mentioned that she feels her body is getting used to the Tramadol.

## 2024-11-27 NOTE — TELEPHONE ENCOUNTER
If she has not used ice/heat, recommend starting to use it. She can take Tramadol 2 tabs up to three times daily as needed.  I would recommend using Lidocaine 5% patch. If that doesn't work, she may consider Biofreeze patches. Hope that helps.         Yonas Toussaint DO  Pain Management   [facility]

## 2024-12-02 ENCOUNTER — TELEPHONE (OUTPATIENT)
Dept: PAIN MEDICINE | Facility: CLINIC | Age: 54
End: 2024-12-02
Payer: MEDICAID

## 2024-12-02 DIAGNOSIS — M53.3 SACROILIAC JOINT DYSFUNCTION: ICD-10-CM

## 2024-12-02 DIAGNOSIS — M47.816 LUMBAR SPONDYLOSIS: Primary | ICD-10-CM

## 2024-12-02 RX ORDER — HYDROCODONE BITARTRATE AND ACETAMINOPHEN 5; 325 MG/1; MG/1
1 TABLET ORAL 3 TIMES DAILY PRN
Qty: 21 TABLET | Refills: 0 | Status: SHIPPED | OUTPATIENT
Start: 2024-12-02 | End: 2024-12-09

## 2024-12-02 NOTE — TELEPHONE ENCOUNTER
STOP Tramadol. Start Norco 5-325 mg TID PRN. If pain is not better after taking Norco, recommend follow up in office.     Yonas Toussaint DO  Pain Management   [facility]

## 2024-12-02 NOTE — TELEPHONE ENCOUNTER
Provider: SHAWN    Caller: Aisha Appiah    Relationship to Patient: Self    Pharmacy: [facility] [id number] - [place], [place] - [address] AT [facility] [address] - [phone]  - [phone] FX     Phone Number: [phone]    Reason for Call: PT STATES TRAMADOL IS NO LONGER WORKING EVEN AFTER INCREASING HOW MANY SHE TAKES A DAY. PT WANTING TO KNOW IF THERE IS ANYTHING ELSE THAT CAN BE CALLED INTO PHARMACY THAT IS STRONGER. PT STATES SHE IS IN PAIN. PLEASE ADVISE.

## 2024-12-10 DIAGNOSIS — M53.3 SACROILIAC JOINT DYSFUNCTION: ICD-10-CM

## 2024-12-10 DIAGNOSIS — M47.816 LUMBAR SPONDYLOSIS: ICD-10-CM

## 2024-12-10 RX ORDER — HYDROCODONE BITARTRATE AND ACETAMINOPHEN 5; 325 MG/1; MG/1
1 TABLET ORAL 3 TIMES DAILY PRN
Qty: 42 TABLET | Refills: 0 | Status: SHIPPED | OUTPATIENT
Start: 2024-12-10 | End: 2024-12-24

## 2024-12-10 NOTE — TELEPHONE ENCOUNTER
Caller: Aisha Appiah    Relationship: Self    Best call back number:     Requested Prescriptions:   Requested Prescriptions     Pending Prescriptions Disp Refills    HYDROcodone-acetaminophen (NORCO) 5-325 MG per tablet 21 tablet 0     Sig: Take 1 tablet by mouth 3 (Three) Times a Day As Needed for Severe Pain for up to 7 days.        Pharmacy where request should be sent: [facility] [id number] [facility] [place] [address] AT [place] [address] - [phone] [facility] [phone] FX     Last office visit with prescribing clinician: 9/26/2024   Last telemedicine visit with prescribing clinician: Visit date not found   Next office visit with prescribing clinician: 12/12/2024     Does the patient have less than a 3 day supply:  [x] Yes  [] No    Would you like a call back once the refill request has been completed: [x] Yes [] No    If the office needs to give you a call back, can they leave a voicemail: [x] Yes [] No    Hernandez Luther Rep   12/10/24 14:42 EST

## 2024-12-11 ENCOUNTER — TELEPHONE (OUTPATIENT)
Dept: PAIN MEDICINE | Facility: CLINIC | Age: 54
End: 2024-12-11
Payer: MEDICAID

## 2024-12-11 NOTE — TELEPHONE ENCOUNTER
I am writing to formally appeal the denial of coverage for bilateral radiofrequency ablation (RFA) at the L4-S1 vertebral levels for my patient, Aisha Appiah. After careful evaluation and treatment, it is clear that this procedure is medically necessary for her condition and will significantly improve her quality of life.    Patient Aisha Appiah has been experiencing severe, predominantly axial back pain for an extended period. Her pain has severely impacted her daily activities, including her ability to fulfill her job obligations as a [employer] employee. Despite other conservative treatment measures, including physical therapy and medication, her pain remains debilitating, with a VAS score ranging between 7/10 at rest and reaching a maximum of 10/10 during increased activities.    Due to the denial of coverage for the proposed RFA, Ms. Appiah has been forced into an escalating regimen of narcotic medications. It is important to note that previous therapeutic interventions, specifically medial branch blocks, provided her with significant relief. Following her second bilateral L4-S1 medial branch block on November 19, 2024, Ms. Appiah reported a remarkable 95% reduction in pain, allowing her to sleep better and resume daily activities without the burdens of pain. Her pain level dropped from a 7/10 to a 1/10, demonstrating the effectiveness of this intervention. The first medial branch block provided a commendable 80% pain relief, reinforcing the necessity for further intervention to maintain her quality of life.    The nature of Ms. Appiah's pain is multifaceted, described as aching, throbbing, burning, sharp, and at times even stabbing. It is exacerbated by static postures, prolonged sitting or standing, and exacerbated by daily activities. The use of over-the-counter analgesics like Tylenol PM offers minimal relief and does not adequately manage her symptoms. This persistent pain not only affects her sleep and  overall health but also restricts her professional capabilities and personal life.    Given the success of her prior procedures and the severity of her current condition, I respectfully request that you review her case and overturn the denial for the bilateral radiofrequency ablation. This intervention is crucial to mitigating her pain, reducing her reliance on narcotics, improving her functional capacity, and ultimately restoring her quality of life.

## 2024-12-11 NOTE — TELEPHONE ENCOUNTER
This is a note in regards to denial from insurance for bilateral L4-SA RFA.  Insurance denial letter stated that they did not receive physician's note stating that patient had greater than 80% pain relief although multiple notes have been sent multiple times stating that patient greater than 90% pain relief with 2 diagnostic blocks with local anesthetic.  Dnba-ft-mumn was done and was told by the physician that there is nothing he could do to reverse the decision.  I even read all my notes to the peer to peer reviewer without any success.  I  was given another number to call after being on call with multiple other people.  Unfortunately due to this denial, patient has been on escalating doses of narcotics even though we have branch blocks gave her significant pain relief where she was completely off of pain medications.  Patient was informed regarding the decision from her insurance.    Yonas Toussaint DO  Pain Management   [facility]

## 2024-12-12 ENCOUNTER — TELEPHONE (OUTPATIENT)
Dept: PAIN MEDICINE | Facility: CLINIC | Age: 54
End: 2024-12-12
Payer: MEDICAID

## 2024-12-12 NOTE — TELEPHONE ENCOUNTER
Nothing today as she has a procedure scheduled on 12.26.2024.  Pt requested a cortisone shot today; per Dr. Toussaint, he can't do that but offered her a steroid pack which the patient refused stating it hadn't worked in the past.  She preferred to cancel today's appt as she has to work, and will wait for procedure on 12.26.24.

## 2024-12-12 NOTE — TELEPHONE ENCOUNTER
Hub staff attempted to follow warm transfer process and was unsuccessful     Caller: Aisha Appiah    Relationship to patient: Self    Best call back number: [phone]    Patient is needing: CALLBACK ASAP  PATIENT WANTS TO KNOW IF SHE WILL BE RECEIVING ANY TYPE OF TREATMENT AT TODAY'S APPT

## 2024-12-17 ENCOUNTER — HOSPITAL ENCOUNTER (EMERGENCY)
Facility: HOSPITAL | Age: 54
Discharge: HOME | End: 2024-12-17
Attending: EMERGENCY MEDICINE | Admitting: EMERGENCY MEDICINE
Payer: MEDICAID

## 2024-12-17 VITALS
DIASTOLIC BLOOD PRESSURE: 79 MMHG | BODY MASS INDEX: 35.99 KG/M2 | WEIGHT: 229.28 LBS | OXYGEN SATURATION: 97 % | HEART RATE: 90 BPM | RESPIRATION RATE: 16 BRPM | TEMPERATURE: 97.9 F | HEIGHT: 67 IN | SYSTOLIC BLOOD PRESSURE: 149 MMHG

## 2024-12-17 DIAGNOSIS — J30.1 ALLERGIC RHINITIS DUE TO POLLEN, UNSPECIFIED SEASONALITY: ICD-10-CM

## 2024-12-17 DIAGNOSIS — M54.50 ACUTE LOW BACK PAIN, UNSPECIFIED BACK PAIN LATERALITY, UNSPECIFIED WHETHER SCIATICA PRESENT: Primary | ICD-10-CM

## 2024-12-17 PROCEDURE — 99282 EMERGENCY DEPT VISIT SF MDM: CPT

## 2024-12-17 PROCEDURE — 96372 THER/PROPH/DIAG INJ SC/IM: CPT

## 2024-12-17 PROCEDURE — 25010000002 HYDROMORPHONE 1 MG/ML SOLUTION: Performed by: EMERGENCY MEDICINE

## 2024-12-17 RX ORDER — LIDOCAINE 50 MG/G
1 PATCH TOPICAL EVERY 24 HOURS
Qty: 15 EACH | Refills: 1 | Status: SHIPPED | OUTPATIENT
Start: 2024-12-17 | End: 2024-12-23 | Stop reason: SDUPTHER

## 2024-12-17 RX ORDER — CETIRIZINE HYDROCHLORIDE 10 MG/1
10 TABLET ORAL DAILY
Qty: 30 TABLET | Refills: 2 | Status: SHIPPED | OUTPATIENT
Start: 2024-12-17 | End: 2024-12-19 | Stop reason: SDUPTHER

## 2024-12-17 RX ORDER — PREDNISONE 20 MG/1
20 TABLET ORAL DAILY
Qty: 5 TABLET | Refills: 0 | Status: SHIPPED | OUTPATIENT
Start: 2024-12-17 | End: 2024-12-23

## 2024-12-17 RX ADMIN — HYDROMORPHONE HYDROCHLORIDE 1 MG: 1 INJECTION, SOLUTION INTRAMUSCULAR; INTRAVENOUS; SUBCUTANEOUS at 13:00

## 2024-12-17 NOTE — ED PROVIDER NOTES
Subjective   History of Present Illness  Patient is a 54-year-old female complaining of right back pain that raise to her left leg.  This is chronic and she follows in pain management.  States pain is worse over the past several days.  Denies recent trauma weakness bowel or bladder abnormality.      Review of Systems    Past Medical History:   Diagnosis Date    Anxiety     Arthritis     Back pain     High cholesterol     Hypertension     Shoulder pain, bilateral        No Known Allergies    Past Surgical History:   Procedure Laterality Date    TUBAL ABDOMINAL LIGATION         Family History   Problem Relation Age of Onset    COPD Mother     No Known Problems Father        Social History     Socioeconomic History    Marital status: Single   Tobacco Use    Smoking status: Former     Types: Cigarettes    Smokeless tobacco: Never    Tobacco comments:     quit 13 yrs ago   Vaping Use    Vaping status: Never Used   Substance and Sexual Activity    Alcohol use: Yes     Comment: caffeine 1c qd    Drug use: Defer    Sexual activity: Defer           Objective   Physical Exam  There is diffuse low back pain on palpation.  Extremities Amcill's range of motion.  Neurologic exam is nonfocal with symmetrical DTRs.  Procedures           ED Course                                                       Medical Decision Making  Patient was given Dilaudid IM.  She will be discharged with a prescription for prednisone.  She will follow with her MD for recheck.    Risk  Parenteral controlled substances.        Final diagnoses:   Acute low back pain, unspecified back pain laterality, unspecified whether sciatica present       ED Disposition  ED Disposition       ED Disposition   Discharge    Condition   Stable    Comment   --               No follow-up provider specified.       Medication List        New Prescriptions      predniSONE 20 MG tablet  Commonly known as: DELTASONE  Take 1 tablet by mouth Daily.               Where to Get Your  Medications        Information about where to get these medications is not yet available    Ask your nurse or doctor about these medications  predniSONE 20 MG tablet            Edward Tafoya MD  12/17/24 [id number]

## 2024-12-19 DIAGNOSIS — J30.1 ALLERGIC RHINITIS DUE TO POLLEN, UNSPECIFIED SEASONALITY: ICD-10-CM

## 2024-12-20 ENCOUNTER — TELEPHONE (OUTPATIENT)
Dept: PAIN MEDICINE | Facility: CLINIC | Age: 54
End: 2024-12-20

## 2024-12-20 RX ORDER — CETIRIZINE HYDROCHLORIDE 10 MG/1
10 TABLET ORAL DAILY
Qty: 30 TABLET | Refills: 2 | Status: SHIPPED | OUTPATIENT
Start: 2024-12-20

## 2024-12-20 NOTE — TELEPHONE ENCOUNTER
SW PATIENT THAT APPROVAL IS FOR SECOND MBB, PATIENT WOULD LIKE TO KNOW IF SHE CAN GET CORTIZONE INJECTION ON BOTH SIDE TO RELIEVE SOME OF TH PAIN

## 2024-12-20 NOTE — TELEPHONE ENCOUNTER
Hub staff attempted to follow warm transfer process and was unsuccessful     Caller: Aisha Appiah    Relationship to patient: Self    Best call back number: [phone]    Patient is needing: PATIENT REQUESTING TO SPEAK WITH TRACEY REGARDING HER PROCEDURE, SHE STATES SHE HAS A LETTER FROM HER INSURANCE COMPANY STATING IT WAS APPROVED.

## 2024-12-23 ENCOUNTER — TELEPHONE (OUTPATIENT)
Dept: PAIN MEDICINE | Facility: HOSPITAL | Age: 54
End: 2024-12-23
Payer: MEDICAID

## 2024-12-23 ENCOUNTER — OFFICE VISIT (OUTPATIENT)
Dept: FAMILY MEDICINE CLINIC | Facility: CLINIC | Age: 54
End: 2024-12-23
Payer: MEDICAID

## 2024-12-23 VITALS
TEMPERATURE: 99.3 F | BODY MASS INDEX: 38.96 KG/M2 | WEIGHT: 248.2 LBS | DIASTOLIC BLOOD PRESSURE: 80 MMHG | RESPIRATION RATE: 16 BRPM | HEIGHT: 67 IN | OXYGEN SATURATION: 96 % | SYSTOLIC BLOOD PRESSURE: 117 MMHG | HEART RATE: 80 BPM

## 2024-12-23 DIAGNOSIS — M54.42 CHRONIC LEFT-SIDED LOW BACK PAIN WITH LEFT-SIDED SCIATICA: ICD-10-CM

## 2024-12-23 DIAGNOSIS — G89.29 CHRONIC LEFT-SIDED LOW BACK PAIN WITH LEFT-SIDED SCIATICA: ICD-10-CM

## 2024-12-23 DIAGNOSIS — Z71.84 TRAVEL ADVICE ENCOUNTER: ICD-10-CM

## 2024-12-23 DIAGNOSIS — L30.9 DERMATITIS: Primary | ICD-10-CM

## 2024-12-23 PROCEDURE — 1160F RVW MEDS BY RX/DR IN RCRD: CPT | Performed by: FAMILY MEDICINE

## 2024-12-23 PROCEDURE — 99214 OFFICE O/P EST MOD 30 MIN: CPT | Performed by: FAMILY MEDICINE

## 2024-12-23 PROCEDURE — 1159F MED LIST DOCD IN RCRD: CPT | Performed by: FAMILY MEDICINE

## 2024-12-23 PROCEDURE — 3079F DIAST BP 80-89 MM HG: CPT | Performed by: FAMILY MEDICINE

## 2024-12-23 PROCEDURE — 1125F AMNT PAIN NOTED PAIN PRSNT: CPT | Performed by: FAMILY MEDICINE

## 2024-12-23 PROCEDURE — 3074F SYST BP LT 130 MM HG: CPT | Performed by: FAMILY MEDICINE

## 2024-12-23 RX ORDER — CLOBETASOL PROPIONATE 0.5 MG/G
1 CREAM TOPICAL 2 TIMES DAILY
Qty: 30 G | Refills: 1 | Status: SHIPPED | OUTPATIENT
Start: 2024-12-23

## 2024-12-23 RX ORDER — TRAMADOL HYDROCHLORIDE 50 MG/1
50 TABLET ORAL
COMMUNITY
Start: 2024-11-24

## 2024-12-23 RX ORDER — LIDOCAINE 50 MG/G
3 PATCH TOPICAL EVERY 24 HOURS
Qty: 90 EACH | Refills: 1 | Status: SHIPPED | OUTPATIENT
Start: 2024-12-23

## 2024-12-23 RX ORDER — MELOXICAM 15 MG/1
15 TABLET ORAL DAILY
COMMUNITY
Start: 2024-10-18

## 2024-12-23 NOTE — PROGRESS NOTES
"Chief Complaint  Mass (Next To Right Ear), Itching (Feet And Hands - Medication Previously Prescribed Not Helping), Stronger Pain Patches (An Increased Dose Rather Than The 3%), and Immunizations (Going To [place] And Needing Vaccines Before She Goes)    Tyesha Appiah presents to [facility]  History of Present Illness  She has an appointment with dermatologist but it is not for 1-2 months.   Going to [facility] in August 2025.   Itching  Symptoms are chronic.   Onset was 6 to12 months.   Symptoms occur constantly.   Symptoms have been unchanged since onset.   Pertinent negative symptoms include no chest pain, no chills, no cough, no diaphoresis, no fever, no nausea, no rash and no swollen glands.   Aggravating factors include nothing.   Treatments tried include OTC medications (antihistamin, topical steroid).   Improvement on treatment was mild.   Back Pain  This is a chronic problem. The current episode started more than 1 year ago. The problem occurs constantly. The problem is unchanged. The pain is present in the lumbar spine. The quality of the pain is described as aching. Pertinent negatives include no chest pain or fever.       Objective   Vital Signs:  /80 (BP Location: Left arm, Patient Position: Sitting, Cuff Size: Adult)   Pulse 80   Temp 99.3 °F (37.4 °C) (Infrared)   Resp 16   Ht 170.2 cm (67\")   Wt 113 kg (248 lb 3.2 oz)   SpO2 96%   BMI 38.87 kg/m²   Estimated body mass index is 38.87 kg/m² as calculated from the following:    Height as of this encounter: 170.2 cm (67\").    Weight as of this encounter: 113 kg (248 lb 3.2 oz).            Physical Exam  Vitals and nursing note reviewed.   Constitutional:       General: She is not in acute distress.     Appearance: She is well-developed.   HENT:      Head: Normocephalic.   Eyes:      General: Lids are normal.      Conjunctiva/sclera: Conjunctivae normal.   Neck:      Thyroid: No thyroid " mass or thyromegaly.      Trachea: Trachea normal.   Cardiovascular:      Rate and Rhythm: Normal rate and regular rhythm.      Heart sounds: Normal heart sounds.   Pulmonary:      Effort: Pulmonary effort is normal.      Breath sounds: Normal breath sounds.   Musculoskeletal:      Cervical back: Normal range of motion.   Lymphadenopathy:      Cervical: No cervical adenopathy.   Skin:     General: Skin is warm and dry.      Findings: Lesion present.      Comments: Right pre-auricular area with small cystic lesion.   Neurological:      Mental Status: She is alert and oriented to person, place, and time.   Psychiatric:         Attention and Perception: She is attentive.         Mood and Affect: Mood normal.         Speech: Speech normal.         Behavior: Behavior normal.        Result Review :  The following data was reviewed by: Ariane Beaulieu MD on 12/23/2024:  Common labs          6/11/2024    10:52   Common Labs   Glucose 92    BUN 11    Creatinine 0.91    Sodium 142    Potassium 3.9    Chloride 106    Calcium 9.0    Albumin 4.0    Total Bilirubin 0.3    Alkaline Phosphatase 49    AST (SGOT) 25    ALT (SGPT) 24    WBC 5.24    Hemoglobin 12.7    Hematocrit 37.3    Platelets 207    Total Cholesterol 266    Triglycerides 86    HDL Cholesterol 77    LDL Cholesterol  175    Hemoglobin A1C 6.00    Microalbumin, Urine <1.2                Assessment and Plan   Diagnoses and all orders for this visit:    1. Dermatitis (Primary)  Assessment & Plan:  Keep appointment with dermatology as planned.     Orders:  -     clobetasol propionate (TEMOVATE) 0.05 % cream; Apply 1 Application topically to the appropriate area as directed 2 (Two) Times a Day.  Dispense: 30 g; Refill: 1    2. Chronic left-sided low back pain with left-sided sciatica  -     lidocaine (LIDODERM) 5 %; Place 3 patches on the skin as directed by provider Daily. Remove & Discard patch within 12 hours or as directed by MD  Dispense: 90 each; Refill: 1    3.  Travel advice encounter  Assessment & Plan:  Patient plans to travel to [facility] in August 2025.  Per the CDC.gov site she needs to be up to date on routine recommended vaccines like MMR, DTP, Shingles, COVID, Hep A and B.  Malaria prophylaxis with doxycycline and Typhoid vaccine (Vivotif) need to be done.  Avoid contaminated water and food.  Avoid mosquito bites.              I spent 40 minutes caring for Aisha on this date of service. This time includes time spent by me in the following activities:preparing for the visit, obtaining and/or reviewing a separately obtained history, performing a medically appropriate examination and/or evaluation , counseling and educating the patient/family/caregiver, ordering medications, tests, or procedures, and documenting information in the medical record  Follow Up   No follow-ups on file.  Patient was given instructions and counseling regarding her condition or for health maintenance advice. Please see specific information pulled into the AVS if appropriate.

## 2024-12-23 NOTE — ASSESSMENT & PLAN NOTE
Patient plans to travel to [facility] in August 2025.  Per the CDC.gov site she needs to be up to date on routine recommended vaccines like MMR, DTP, Shingles, COVID, Hep A and B.  Malaria prophylaxis with doxycycline and Typhoid vaccine (Vivotif) need to be done.  Avoid contaminated water and food.  Avoid mosquito bites.

## 2024-12-26 ENCOUNTER — HOSPITAL ENCOUNTER (OUTPATIENT)
Dept: PAIN MEDICINE | Facility: HOSPITAL | Age: 54
Discharge: HOME | End: 2024-12-26
Payer: MEDICAID

## 2024-12-26 VITALS
HEIGHT: 67 IN | OXYGEN SATURATION: 98 % | HEART RATE: 82 BPM | WEIGHT: 248 LBS | TEMPERATURE: 97.1 F | SYSTOLIC BLOOD PRESSURE: 159 MMHG | BODY MASS INDEX: 38.92 KG/M2 | RESPIRATION RATE: 16 BRPM | DIASTOLIC BLOOD PRESSURE: 90 MMHG

## 2024-12-26 DIAGNOSIS — R52 PAIN: ICD-10-CM

## 2024-12-26 DIAGNOSIS — M47.816 LUMBAR SPONDYLOSIS: Primary | ICD-10-CM

## 2024-12-26 PROCEDURE — 25010000002 FENTANYL CITRATE (PF) 50 MCG/ML SOLUTION

## 2024-12-26 PROCEDURE — 25010000002 MIDAZOLAM PER 1 MG

## 2024-12-26 PROCEDURE — 77003 FLUOROGUIDE FOR SPINE INJECT: CPT

## 2024-12-26 PROCEDURE — 25010000002 LIDOCAINE 2% SOLUTION: Performed by: STUDENT IN AN ORGANIZED HEALTH CARE EDUCATION/TRAINING PROGRAM

## 2024-12-26 PROCEDURE — 25010000002 LIDOCAINE PF 1% 1 % SOLUTION: Performed by: STUDENT IN AN ORGANIZED HEALTH CARE EDUCATION/TRAINING PROGRAM

## 2024-12-26 RX ORDER — LIDOCAINE HYDROCHLORIDE 10 MG/ML
5 INJECTION, SOLUTION EPIDURAL; INFILTRATION; INTRACAUDAL; PERINEURAL ONCE
Status: COMPLETED | OUTPATIENT
Start: 2024-12-26 | End: 2024-12-26

## 2024-12-26 RX ORDER — FENTANYL CITRATE 50 UG/ML
INJECTION, SOLUTION INTRAMUSCULAR; INTRAVENOUS
Status: COMPLETED
Start: 2024-12-26 | End: 2024-12-26

## 2024-12-26 RX ORDER — MIDAZOLAM HYDROCHLORIDE 1 MG/ML
INJECTION, SOLUTION INTRAMUSCULAR; INTRAVENOUS
Status: COMPLETED
Start: 2024-12-26 | End: 2024-12-26

## 2024-12-26 RX ORDER — LIDOCAINE HYDROCHLORIDE 20 MG/ML
10 INJECTION, SOLUTION INFILTRATION; PERINEURAL ONCE
Status: COMPLETED | OUTPATIENT
Start: 2024-12-26 | End: 2024-12-26

## 2024-12-26 RX ADMIN — MIDAZOLAM 1 MG: 1 INJECTION INTRAMUSCULAR; INTRAVENOUS at 11:09

## 2024-12-26 RX ADMIN — LIDOCAINE HYDROCHLORIDE 10 ML: 20 INJECTION, SOLUTION INFILTRATION; PERINEURAL at 11:16

## 2024-12-26 RX ADMIN — LIDOCAINE HYDROCHLORIDE 5 ML: 10 INJECTION, SOLUTION EPIDURAL; INFILTRATION; INTRACAUDAL; PERINEURAL at 11:06

## 2024-12-26 RX ADMIN — FENTANYL CITRATE 100 MCG: 50 INJECTION, SOLUTION INTRAMUSCULAR; INTRAVENOUS at 11:05

## 2024-12-26 RX ADMIN — MIDAZOLAM 1 MG: 1 INJECTION INTRAMUSCULAR; INTRAVENOUS at 11:05

## 2024-12-26 NOTE — PROCEDURES
Preoperative Diagnosis:    Lumbar Spondylosis/ Sacroiliac Joint Dysfunction                                              Postoperative Diagnosis: SAME     PROCEDURE:  Radiofrequency Ablation (RFA) of Lumbar Medial Branch at Bilateral   L4, 5, sacro ala      NOTE:  After obtaining written informed consent patient was taken to the procedure room. Pre-procedure blood pressure and pulse were stable and recorded in patients clinic chart. The patient was placed in a prone position and right lumbar area was prepped with chloraprep times two and draped in the usual sterile fashion.  The skin over the right L 4 transverse process with vertebral body was infiltrated with 1% lidocaine for local anesthesia.  A 20-gauge  mm needle with 5 mm active was inserted into the right L 4 medial branch under radiographic guidance. Both AP and lateral views were obtained. The identical procedure was performed on right L 5 and SA medial branch. Following placement of the needle sensory stimulation at 50 Hz and motor stimulation at 2 Hz was carried out. 1 cc of 2% lidocaine was injected.   Following placement of the needle sensory stimulation at 50 Hz and motor stimulation at 2 Hz was carried out. 1 cc of 2% lidocaine was injected. The RFA was carried out in lesion mode after patient reporting reproduction of pain or sensation in the area of symptoms and denying extremity motor sensations. The settings were 80°C, and 90 seconds. During the procedure no pain was reported in the extremities by the patient.      Similar procedure was repeated at Left L4, L5 and Sa.     After the procedure the needles were removed. Skin was cleaned and a sterile dressing was applied. Following the procedure the patient's vital signs were stable. The patient was discharged.     COMPLICATIONS: None     RFA DATA: In chart    Moderate sedation:   Start time: 1105 AM  End time: 1123 AM    100 mcg Fentanly; 2 mg Versed ; 15 minutes of moderate sedation was done  and vitals were monitored closely during this periods.  ETCO2, RR, BP, HR, O2 were monitored closely throughout sedation period.        Yonas Toussaint DO  Pain Management   [facility]

## 2024-12-26 NOTE — DISCHARGE INSTRUCTIONS
Radiofrequency Lesioning, Care After    Refer to this sheet in the next few weeks. These instructions provide you with information about caring for yourself after your procedure. Your health care provider may also give you more specific instructions. Your treatment has been planned according to current medical practices, but problems sometimes occur. Call your health care provider if you have any problems or questions after your procedure.  What can I expect after the procedure?  After the procedure, it is common to have:  Pain from the burned nerve.  You may feel a burning sensation for up to 1-2 weeks after the procedure  Temporary numbness at the site  Your leg/legs may be weak or feel numb after the procedure until the numbing medication wears off.  When you are up and walking, have assistance to prevent falling.     Follow these instructions at home:  Take over-the-counter and prescription medicines only as told by your health care provider.  Return to your normal activities as told by your health care provider. Ask your health care provider what activities are safe for you.  Pay close attention to how you feel after the procedure. If you start to have pain, write down when it hurts and how it feels. This will help you and your health care provider to know if you need an additional treatment.  Check your needle insertion site every day for signs of infection. Watch for:  Redness, swelling, or pain.  Fluid, blood, or pus.  Keep all follow-up visits as told by your health care provider. This is important.  No driving for 24 hrs-make sure you have full sensation in your legs prior to driving.  Avoid using heat on the injection site for 24 hours. You may use ice intermittently if needed by placing a               towel between your skin and the ice bag and using the ice for 20 minutes 2-3 times a day.  Do not take baths, swim or use a hot tub for 24 hours.  Leave your band-aids on for 24 hours and keep them  dry.    Contact a health care provider if:  Your pain does not get better.  You have redness, swelling, or pain at the needle insertion site.  You have fluid, blood, or pus coming from the needle insertion site.  You have a fever over 101 degrees.  You have new numb.ness in your arm or leg after the procedure    Get help right away if:  You develop sudden, severe pain.  You develop numbness or tingling near the procedure site that does not go away.  This information is not intended to replace advice given to you by your health care provider. Make sure you discuss any questions you have with your health care provider.  Document Released: 08/16/2012 Document Revised: 05/25/2017 Document Reviewed: 01/25/2016  [employer] Interactive Patient Education © 2019 [employer]  Moderate Conscious Sedation, Adult, Care After    This sheet gives you information about how to care for yourself after your procedure. Your health care provider may also give you more specific instructions. If you have problems or questions, contact your health care provider.    What can I expect after the procedure?  After the procedure, it is common to have:  Sleepiness for several hours.  Impaired judgment for several hours.  Difficulty with balance.  Vomiting if you eat too soon.    Follow these instructions at home:  For the next 24 hours:         Rest.  Do not participate in activities where you could fall or become injured.  Do not drive or use machinery.  Do not drink alcohol.  Do not take sleeping pills or medicines that cause drowsiness.  Do not make important decisions or sign legal documents.  Do not take care of children on your own.    Eating and drinking    Follow the diet recommended by your health care provider.  Drink enough fluid to keep your urine pale yellow.  If you vomit:  Drink water, juice, or soup when you can drink without vomiting.  Make sure you have little or no nausea before eating solid foods.     General  instructions  Take over-the-counter and prescription medicines only as told by your health care provider.  Have a responsible adult stay with you for 24 hours. It is important to have someone help care for you until you are awake and alert.  Do not smoke.  Keep all follow-up visits as told by your health care provider. This is important.    Contact a health care provider if:  You are still sleepy or having trouble with balance after 24 hours.  You feel light-headed.  You keep feeling nauseous or you keep vomiting.  You develop a rash.  You have a fever.  You have redness or swelling around the IV site.    Get help right away if:  You have trouble breathing.  You have new-onset confusion at home.    Summary  After the procedure, it is common to feel sleepy, have impaired judgment, or feel nauseous if you eat too soon.  Rest after you get home. Know the things you should not do after the procedure.  Follow the diet recommended by your health care provider and drink enough fluid to keep your urine pale yellow.  Get help right away if you have trouble breathing or new-onset confusion at home.    This information is not intended to replace advice given to you by your health care provider. Make sure you discuss any questions you have with your health care provider.    Document Revised: 04/16/2021 Document Reviewed: 11/12/2020  Elsevier Patient Education © 2021 [employer]

## 2024-12-26 NOTE — H&P
H and P reviewed from previous visit and increased pain since last visit due to insurance delaying RFA. Will proceed with procedure as planned. Patient denies history of DM and being on blood thinners.    Sedation Plan    ASA 3     Mallampati class: II.    Risks, benefits, and alternatives discussed with patient.          Yonas Toussaint DO  Pain Management   [facility]

## 2024-12-27 ENCOUNTER — TELEPHONE (OUTPATIENT)
Dept: PAIN MEDICINE | Facility: CLINIC | Age: 54
End: 2024-12-27

## 2024-12-27 ENCOUNTER — TELEPHONE (OUTPATIENT)
Dept: PAIN MEDICINE | Facility: CLINIC | Age: 54
End: 2024-12-27
Payer: MEDICAID

## 2024-12-27 DIAGNOSIS — M47.816 LUMBAR SPONDYLOSIS: Primary | ICD-10-CM

## 2024-12-27 DIAGNOSIS — Z79.899 HIGH RISK MEDICATION USE: ICD-10-CM

## 2024-12-27 RX ORDER — HYDROCODONE BITARTRATE AND ACETAMINOPHEN 5; 325 MG/1; MG/1
1 TABLET ORAL EVERY 8 HOURS PRN
Qty: 90 TABLET | Refills: 0 | Status: SHIPPED | OUTPATIENT
Start: 2024-12-27 | End: 2025-01-26

## 2024-12-27 NOTE — TELEPHONE ENCOUNTER
Caller: Aisha Appiah    Relationship: Self    Best call back number: [phone]    Requested Prescriptions:   HYDROcodone-acetaminophen (NORCO) 5-325 MG per tablet [40030] (Order [id number])     Pharmacy where request should be sent:    [facility]    Last office visit with prescribing clinician: 9/26/2024   Last telemedicine visit with prescribing clinician: Visit date not found   Next office visit with prescribing clinician: 1/20/2025     Additional details provided by patient:     Does the patient have less than a 3 day supply:  [x] Yes  [] No    Would you like a call back once the refill request has been completed: [x] Yes [] No    If the office needs to give you a call back, can they leave a voicemail: [x] Yes [] No    Hernandez De Anda Rep   12/27/24 10:35 EST

## 2024-12-27 NOTE — TELEPHONE ENCOUNTER
Caller: Aisha Appiah     Relationship: SELF    Best call back number: [phone]    What is your medical concern? PAIN AFTER PROCEDURE    How long has this issue been going on? TODAY SINCE NUMBING MEDS WORN OFF    Is your provider already aware of this issue? NO    Have you been treated for this issue? NO    HUB ATTEMPTED TO WT

## 2024-12-30 ENCOUNTER — TELEPHONE (OUTPATIENT)
Dept: PAIN MEDICINE | Facility: CLINIC | Age: 54
End: 2024-12-30
Payer: MEDICAID

## 2024-12-30 RX ORDER — METHYLPREDNISOLONE 4 MG/1
TABLET ORAL
Qty: 21 TABLET | Refills: 0 | Status: SHIPPED | OUTPATIENT
Start: 2024-12-30

## 2024-12-30 NOTE — TELEPHONE ENCOUNTER
Patient advised. She would like me to send this note to Dr Toussaint or review when he returns. She has tried steroid packs, biofreeze, heat, ice etc and nothing is helping. She has been having this pain for months now. Patient would like to have some kind of injection at her follow up appt on 1/20

## 2024-12-30 NOTE — TELEPHONE ENCOUNTER
Caller: LO   Relationship to Patient: SELF  Phone Number: [phone] (home)     Reason for Call: PATIENT CALLING STATING THAT HER SCIATICA IS ACTING UP VERY BADLY AGAIN AFFECTING HER ABILITY TO WALK, SHE WANTS TO KNOW IF SHE CAN GET HELP FOR THIS AT HER 01/20 APPT AND IF THE DR HAS ANY SUGGESTIONS ON THINGS TO HELP SO SHE DOESN'T HAVE TO GO TO THE ER

## 2024-12-30 NOTE — TELEPHONE ENCOUNTER
Patient states her sciatica pain has severely increased. Its hard for her to get around without having intense pain. She would like to know if there is any kind of injection or help she can get before her next appt with Dr Toussaint on 1/20 or if she needs to go to the ER again. Patient aware Dr Toussaint is on vacation for 2 weeks. Patient did get 85% relief ongoing from lumbar RFA

## 2025-01-16 DIAGNOSIS — I10 PRIMARY HYPERTENSION: ICD-10-CM

## 2025-01-16 DIAGNOSIS — G44.52 NEW DAILY PERSISTENT HEADACHE: ICD-10-CM

## 2025-01-16 DIAGNOSIS — E78.5 HYPERLIPIDEMIA, UNSPECIFIED HYPERLIPIDEMIA TYPE: ICD-10-CM

## 2025-01-16 DIAGNOSIS — J30.1 ALLERGIC RHINITIS DUE TO POLLEN, UNSPECIFIED SEASONALITY: ICD-10-CM

## 2025-01-16 RX ORDER — CETIRIZINE HYDROCHLORIDE 10 MG/1
10 TABLET ORAL DAILY
Qty: 30 TABLET | Refills: 2 | Status: SHIPPED | OUTPATIENT
Start: 2025-01-16

## 2025-01-16 RX ORDER — HYDROXYZINE HYDROCHLORIDE 25 MG/1
25 TABLET, FILM COATED ORAL 3 TIMES DAILY PRN
Qty: 30 TABLET | Refills: 0 | Status: SHIPPED | OUTPATIENT
Start: 2025-01-16

## 2025-01-16 RX ORDER — LISINOPRIL AND HYDROCHLOROTHIAZIDE 20; 25 MG/1; MG/1
1 TABLET ORAL DAILY
Qty: 90 TABLET | Refills: 1 | Status: SHIPPED | OUTPATIENT
Start: 2025-01-16

## 2025-01-16 RX ORDER — ATORVASTATIN CALCIUM 20 MG/1
20 TABLET, FILM COATED ORAL NIGHTLY
Qty: 90 TABLET | Refills: 1 | Status: SHIPPED | OUTPATIENT
Start: 2025-01-16

## 2025-01-18 NOTE — PROGRESS NOTES
Tyesha Appiah is a 54 y.o. female here for follow up for lower back pain.  Patient was last seen for bilateral RFA L4-SA with significant improvement in axial lower back pain.  She has new pain which is now radiating down to her left leg.    On last visit:      Lower back pain is 6/10 on VAS, at maximum is 6/10. Pain is aching, pressure, throbbing, burning, sharp, tingling, soreness, spasms, numbness, stabbing in nature. Pain is referred left buttock, left anterior thigh and left leg.  The pain is constant. The pain is improved by tylenol PM. The pain is worse with any increased activities, working and being in 1 position with sitting or standing.  Affecting her sleep and ability to work.  X-ray lower back pain is significantly improved after RFA.     Chronic mostly daily migraines in the base of her neck radiating to top of her head.  Scheduled for MRI of her head.     Previous Injection:   12/26/2024-bilateral RFA L4-SA-80 to 90% pain relief with functional improvement.    Hx: Referred by Dr. Beaulieu, Ariane Goetz MD for lower back pain. She had chronic lower back pain for long time but significantly worsened with being constant for last 1 month. She has been seeing orthopedics for bilateral knee gel and steroids injections and bilateral shoulder pain.  She was diagnosed with strain of deltoid muscle in 2021 by Dr. Solis and was recommended biomed cream. She has tried multiple NSAIDs, tylenol, lidoderm patches, baclofen without much relief.         PHQ-9- 10                     SOAPP- 2  [place] back disability scale - 56     PMH:   Depression, hypertension, bilateral knee pain     Current Medications:   Baclofen 10 mg  Diclofenac 50 mg  Voltaren 1% gel  Lidoderm patches 5%  Nurtec        Past Medications:  Meloxicam didn't help  Diclofeanc        Past Modalities:  TENS:                                                                          no                                                   Physical Therapy Within The Last 6 Months              Yes- [facility] - 2 months finished early 2024. Some relief; she has been doing home exercises daily.   Psychotherapy                                                            no  Massage Therapy                                                       no     Patient Complains Of:  Uro-Fecal Incontinence          no  Weight Gain/Loss                   no  Fever/Chills                             no  Weakness                               no        PEG Assessment   What number best describes your pain on average in the past week?7  What number best describes how, during the past week, pain has interfered with your enjoyment of life?7  What number best describes how, during the past week, pain has interfered with your general activity?  7           Current Outpatient Medications:     [START ON 1/26/2025] HYDROcodone-acetaminophen (NORCO) 5-325 MG per tablet, Take 1 tablet by mouth Every 8 (Eight) Hours As Needed for Severe Pain for up to 30 days., Disp: 90 tablet, Rfl: 0    [START ON 2/25/2025] HYDROcodone-acetaminophen (NORCO) 5-325 MG per tablet, Take 1 tablet by mouth Every 8 (Eight) Hours As Needed for Severe Pain for up to 30 days., Disp: 90 tablet, Rfl: 0    atorvastatin (LIPITOR) 20 MG tablet, Take 1 tablet by mouth Every Night., Disp: 90 tablet, Rfl: 1    baclofen (LIORESAL) 10 MG tablet, Take 1 tablet by mouth 3 (Three) Times a Day., Disp: 30 tablet, Rfl: 0    cetirizine (zyrTEC) 10 MG tablet, Take 1 tablet by mouth Daily., Disp: 30 tablet, Rfl: 2    clobetasol propionate (TEMOVATE) 0.05 % cream, Apply 1 Application topically to the appropriate area as directed 2 (Two) Times a Day., Disp: 30 g, Rfl: 1    gabapentin (NEURONTIN) 300 MG capsule, Take 1 capsule by mouth 3 times a day for 60 days., Disp: 90 capsule, Rfl: 1    hydrOXYzine (ATARAX) 25 MG tablet, TAKE 1 TABLET BY MOUTH 3 TIMES A DAY AS NEEDED FOR ITCHING, Disp: 30 tablet, Rfl: 0    hydrOXYzine  (ATARAX) 25 MG tablet, Take 1 tablet by mouth 3 (Three) Times a Day As Needed for Itching., Disp: 30 tablet, Rfl: 0    lidocaine (LIDODERM) 5 %, Place 3 patches on the skin as directed by provider Daily. Remove & Discard patch within 12 hours or as directed by MD, Disp: 90 each, Rfl: 1    lisinopril-hydrochlorothiazide (PRINZIDE,ZESTORETIC) 20-25 MG per tablet, Take 1 tablet by mouth Daily., Disp: 90 tablet, Rfl: 1    meloxicam (MOBIC) 15 MG tablet, Take 1 tablet by mouth Daily., Disp: , Rfl:     methylPREDNISolone (MEDROL) 4 MG dose pack, Take as directed on package instructions., Disp: 21 tablet, Rfl: 0    pantoprazole (PROTONIX) 40 MG EC tablet, Take 1 tablet by mouth Daily., Disp: 90 tablet, Rfl: 1    rimegepant sulfate (Nurtec) 75 MG tablet, Take 1 tablet by mouth Every Other Day As Needed (headache)., Disp: 2 tablet, Rfl: 0    traMADol (ULTRAM) 50 MG tablet, Take 1 tablet by mouth., Disp: , Rfl:     triamcinolone (KENALOG) 0.1 % cream, Apply 1 Application topically to the appropriate area as directed 2 (Two) Times a Day., Disp: 80 g, Rfl: 1    The following portions of the patient's history were reviewed and updated as appropriate: allergies, current medications, past family history, past medical history, past social history, past surgical history, and problem list.      REVIEW OF PERTINENT MEDICAL DATA    Past Medical History:   Diagnosis Date    Anxiety     Arthritis     Back pain     High cholesterol     Hypertension     Shoulder pain, bilateral      Past Surgical History:   Procedure Laterality Date    TUBAL ABDOMINAL LIGATION       Family History   Problem Relation Age of Onset    COPD Mother     No Known Problems Father      Social History     Socioeconomic History    Marital status: Single   Tobacco Use    Smoking status: Former     Types: Cigarettes    Smokeless tobacco: Never    Tobacco comments:     quit 13 yrs ago   Vaping Use    Vaping status: Never Used   Substance and Sexual Activity    Alcohol  use: Yes     Comment: caffeine 1c qd    Drug use: Defer    Sexual activity: Defer         Review of Systems   Musculoskeletal:  Positive for arthralgias.         There were no vitals filed for this visit.      Objective   Physical Exam  Musculoskeletal:        Legs:            Imaging Reviewed:  Lumbar x-ray-6/24/2024  - Advanced bilateral L5-S1 facet arthropathy with grade 1 anterolisthesis of L5 on S1  - Degenerative sclerotic changes of right SI joint inferiorly     Right shoulder x-ray-2021  - Mild to moderate AC joint osteoarthropathy    Assessment:    1. Lumbar spondylosis    2. High risk medication use    3. Occipital neuralgia, unspecified laterality    4. Lumbar radiculopathy           Plan:   1.  UDS on 7/1/2024 is consistent with patient's interview and negative for any drugs not prescribed.  Narcotic contract was signed on 7/1/2024.  2. We discussed trying a course of formal physical therapy.  Physical therapy can help strengthen and stretch the muscles around the joints. Continue to be as active as possible.  Patient has done 6 of physical therapy with moderate improvement  3.  Patient has failed meloxicam, tramadol and diclofenac.  For now, continue Norco 5-325 mg TID PRN (1/26; 2/25). Discussed with the patient regarding long-term side effects of opioids including but not limited to opioid induced hormonal suppression, hyperalgesia, fatigue, weight gain, possible opioid induced altered immune system, addiction, tolerance, dependence, risk of hearing loss and elevated risk of myocardial infarction. Proper use and potential life threatening side effects of over use discussed with patient. Patient states understanding of their use and risks.  Opioid Education for patient's receiving narcotics for pain: Patient was instructed regarding the risks, benefits, alternative forms of treatment, as well as potential development of tolerance and/or addiction. Patient was instructed to take the medication strictly  as ordered, not to share the medication with others, not to drive while taking opioids, and to take no other sedatives/pain medications or alcohol while taking this prescription. The patient was instructed to wean off of the pain medication as healing occurs and pain resolves.   4.  Due to radicular nerve pain, start gabapentin 30 mg nightly and increase it to 3 times daily. Side effects discussed with the patient including but not limited to somnolence, dizziness, ataxia, headache, fatigue, blurred vision, cold or flu-like symptoms,delusions, hoarseness, lack or loss of strength, lower back or side pain, swelling of the hands, feet, or lower legs trembling or shaking. Patient understands and agrees with the plan.  5.  Her migraines are consistent with occipital neuralgia pain.  May consider bilateral lesser occipital nerve blocks in future.  Recommend getting imaging done for head to rule out other causes.  6.  Excellent pain relief with bilateral L4-SA RFA for axial lower back pain.  Her main complaint is left leg radiculopathy which is new from previous exam.  Concerned about nerve impingement or spinal canal stenosis.  Will obtain lumbar MRI without contrast at open MRI as patient is claustrophobic.  Sent to [email or web address] on 1/20/2025.     RTC after MRI.     Yonas Toussaint DO  Pain Management   [facility]           INSPECT REPORT    As part of the patient's treatment plan, I may be prescribing controlled substances. The patient has been made aware of appropriate use of such medications, including potential risk of somnolence, limited ability to drive and/or work safely, and the potential for dependence or overdose. It has also been made clear that these medications are for use by this patient only, without concomitant use of alcohol or other substances unless prescribed.     Patient has completed prescribing agreement detailing terms of continued prescribing of controlled substances, including monitoring INSPECT  reports, urine drug screening, and pill counts if necessary. The patient is aware that inappropriate use will results in cessation of prescribing such medications.    INSPECT report has been reviewed and scanned into the patient's chart.

## 2025-01-20 ENCOUNTER — OFFICE VISIT (OUTPATIENT)
Dept: PAIN MEDICINE | Facility: CLINIC | Age: 55
End: 2025-01-20
Payer: MEDICAID

## 2025-01-20 VITALS
SYSTOLIC BLOOD PRESSURE: 143 MMHG | WEIGHT: 247 LBS | OXYGEN SATURATION: 98 % | HEART RATE: 85 BPM | BODY MASS INDEX: 38.69 KG/M2 | DIASTOLIC BLOOD PRESSURE: 99 MMHG | RESPIRATION RATE: 16 BRPM

## 2025-01-20 DIAGNOSIS — M54.16 LUMBAR RADICULOPATHY: ICD-10-CM

## 2025-01-20 DIAGNOSIS — M54.81 OCCIPITAL NEURALGIA, UNSPECIFIED LATERALITY: ICD-10-CM

## 2025-01-20 DIAGNOSIS — M47.816 LUMBAR SPONDYLOSIS: Primary | ICD-10-CM

## 2025-01-20 DIAGNOSIS — Z79.899 HIGH RISK MEDICATION USE: ICD-10-CM

## 2025-01-20 PROCEDURE — 99214 OFFICE O/P EST MOD 30 MIN: CPT | Performed by: STUDENT IN AN ORGANIZED HEALTH CARE EDUCATION/TRAINING PROGRAM

## 2025-01-20 PROCEDURE — 1125F AMNT PAIN NOTED PAIN PRSNT: CPT | Performed by: STUDENT IN AN ORGANIZED HEALTH CARE EDUCATION/TRAINING PROGRAM

## 2025-01-20 RX ORDER — HYDROCODONE BITARTRATE AND ACETAMINOPHEN 5; 325 MG/1; MG/1
1 TABLET ORAL EVERY 8 HOURS PRN
Qty: 90 TABLET | Refills: 0 | Status: SHIPPED | OUTPATIENT
Start: 2025-02-25 | End: 2025-03-27

## 2025-01-20 RX ORDER — HYDROCODONE BITARTRATE AND ACETAMINOPHEN 5; 325 MG/1; MG/1
1 TABLET ORAL EVERY 8 HOURS PRN
Qty: 90 TABLET | Refills: 0 | Status: SHIPPED | OUTPATIENT
Start: 2025-01-26 | End: 2025-02-25

## 2025-01-20 RX ORDER — GABAPENTIN 300 MG/1
300 CAPSULE ORAL 3 TIMES DAILY
Qty: 90 CAPSULE | Refills: 1 | Status: SHIPPED | OUTPATIENT
Start: 2025-01-20 | End: 2025-03-21

## 2025-01-22 ENCOUNTER — TELEPHONE (OUTPATIENT)
Dept: PAIN MEDICINE | Facility: CLINIC | Age: 55
End: 2025-01-22
Payer: MEDICAID

## 2025-01-22 NOTE — TELEPHONE ENCOUNTER
Caller: MACARRA FROM [facility]    Relationship to patient:   IMAGING FACILITY    Best call back number: [phone]    Provider: DR. SUDHA ESCOBAR    Medication PA needed: CALLING FOR PRIOR AUTH. FOR MRI L/SPINE SO THEY CAN SCHEDULE PATIENT.    Reason for call/Prior Auth: MRI L/SPINE

## 2025-01-27 ENCOUNTER — TELEPHONE (OUTPATIENT)
Dept: FAMILY MEDICINE CLINIC | Facility: CLINIC | Age: 55
End: 2025-01-27

## 2025-01-27 NOTE — TELEPHONE ENCOUNTER
Caller: Aisha Appiah    Relationship: Self    Best call back number: [phone]     What is the best time to reach you: ANY    Who are you requesting to speak with (clinical staff, provider,  specific staff member): CLINICAL    Do you know the name of the person who called: AISHA    What was the call regarding: PATIENT WOULD LIKE TO DISCUSS THE OPTION OF A BREAST REDUCTION FOR BACK PAIN.    Is it okay if the provider responds through MyChart:

## 2025-01-28 ENCOUNTER — TELEPHONE (OUTPATIENT)
Dept: PAIN MEDICINE | Facility: CLINIC | Age: 55
End: 2025-01-28

## 2025-01-28 ENCOUNTER — TELEPHONE (OUTPATIENT)
Dept: FAMILY MEDICINE CLINIC | Facility: CLINIC | Age: 55
End: 2025-01-28
Payer: MEDICAID

## 2025-01-28 PROBLEM — N62 MACROMASTIA: Status: ACTIVE | Noted: 2025-01-28

## 2025-01-28 NOTE — TELEPHONE ENCOUNTER
She will need to choose the plastic surgeon that she would like to use and then I can refer her to see them.

## 2025-01-28 NOTE — TELEPHONE ENCOUNTER
PATIENT CALLED BACK TO CANCEL HER APPT.  SHE WILL CONTACT HER INSURANCE COMPANY ON WHO TAKES HER INSURANCE FIRST

## 2025-01-28 NOTE — TELEPHONE ENCOUNTER
Caller: Aisha Appiah    Relationship to patient: Self    Best call back number: [phone]    Patient is needing: PATIENT CALLED IN LET

## 2025-01-28 NOTE — TELEPHONE ENCOUNTER
Caller: Aisha Appiah    Relationship: Self    Best call back number: [phone]      What was the call regarding: PT STATES THE FEELS LIKE A LOT OF HER BACK PAIN IS A RESULT OF THE SIZE OF HER BREASTS. PT WOULD LIKE TO KNOW IF DR ESCOBAR COULD REFER PT SOMEWHERE FOR A BREAST REDUCTION CONSULTATION. PLEASE CONTACT PT TO DISCUSS.

## 2025-01-29 DIAGNOSIS — M54.42 CHRONIC LEFT-SIDED LOW BACK PAIN WITH LEFT-SIDED SCIATICA: ICD-10-CM

## 2025-01-29 DIAGNOSIS — G89.29 CHRONIC LEFT-SIDED LOW BACK PAIN WITH LEFT-SIDED SCIATICA: ICD-10-CM

## 2025-01-30 DIAGNOSIS — M54.42 CHRONIC LEFT-SIDED LOW BACK PAIN WITH LEFT-SIDED SCIATICA: ICD-10-CM

## 2025-01-30 DIAGNOSIS — G89.29 CHRONIC LEFT-SIDED LOW BACK PAIN WITH LEFT-SIDED SCIATICA: ICD-10-CM

## 2025-01-30 NOTE — TELEPHONE ENCOUNTER
Rx Refill Note  Requested Prescriptions     Pending Prescriptions Disp Refills    traMADol (ULTRAM) 50 MG tablet       Sig: Take 1 tablet by mouth.      Last office visit with prescribing clinician: 12/23/2024   Last telemedicine visit with prescribing clinician: Visit date not found   Next office visit with prescribing clinician: 1/30/2025                         Would you like a call back once the refill request has been completed: [] Yes [] No    If the office needs to give you a call back, can they leave a voicemail: [] Yes [] No    Twyla Curtis MA  01/30/25, 11:42 EST

## 2025-01-30 NOTE — TELEPHONE ENCOUNTER
Rx Refill Note  Requested Prescriptions     Pending Prescriptions Disp Refills    meloxicam (MOBIC) 15 MG tablet [Pharmacy Med Name: MELOXICAM 15 MG TABLET] 90 tablet      Sig: TAKE 1 TABLET BY MOUTH DAILY      Last office visit with prescribing clinician: 12/23/2024   Last telemedicine visit with prescribing clinician: Visit date not found   Next office visit with prescribing clinician: 6/17/2025                         Would you like a call back once the refill request has been completed: [] Yes [] No    If the office needs to give you a call back, can they leave a voicemail: [] Yes [] No    Twyal Curtis MA  01/30/25, 11:48 EST

## 2025-01-31 RX ORDER — MELOXICAM 15 MG/1
15 TABLET ORAL DAILY
Qty: 90 TABLET | Refills: 1 | Status: SHIPPED | OUTPATIENT
Start: 2025-01-31

## 2025-01-31 RX ORDER — TRAMADOL HYDROCHLORIDE 50 MG/1
50 TABLET ORAL
OUTPATIENT
Start: 2025-01-31

## 2025-03-11 ENCOUNTER — TELEPHONE (OUTPATIENT)
Dept: PAIN MEDICINE | Facility: CLINIC | Age: 55
End: 2025-03-11
Payer: MEDICAID

## 2025-03-11 NOTE — TELEPHONE ENCOUNTER
Caller: Aisha Appiah    Relationship: Self    Best call back number: [phone]    Requested Prescriptions:   HYDROCODONE     Pharmacy where request should be sent: [facility] [id number] - [place], [place] - [address] AT [facility] [address] - [phone] [facility] [phone] FX     Last office visit with prescribing clinician: Visit date not found   Last telemedicine visit with prescribing clinician: Visit date not found   Next office visit with prescribing clinician: Visit date not found     Does the patient have less than a 3 day supply:  [x] Yes  [] No    Would you like a call back once the refill request has been completed: [] Yes [] No    If the office needs to give you a call back, can they leave a voicemail: [] Yes [] No    Blaze Benson   03/11/25 15:41 EDT

## 2025-03-25 ENCOUNTER — OFFICE VISIT (OUTPATIENT)
Dept: FAMILY MEDICINE CLINIC | Facility: CLINIC | Age: 55
End: 2025-03-25
Payer: MEDICAID

## 2025-03-25 ENCOUNTER — LAB (OUTPATIENT)
Dept: FAMILY MEDICINE CLINIC | Facility: CLINIC | Age: 55
End: 2025-03-25
Payer: MEDICAID

## 2025-03-25 VITALS
HEART RATE: 86 BPM | OXYGEN SATURATION: 96 % | BODY MASS INDEX: 37.93 KG/M2 | HEIGHT: 67 IN | TEMPERATURE: 98.4 F | WEIGHT: 241.7 LBS | RESPIRATION RATE: 16 BRPM | DIASTOLIC BLOOD PRESSURE: 89 MMHG | SYSTOLIC BLOOD PRESSURE: 129 MMHG

## 2025-03-25 DIAGNOSIS — Z71.84 TRAVEL ADVICE ENCOUNTER: ICD-10-CM

## 2025-03-25 DIAGNOSIS — L72.0 EPIDERMAL CYST OF FACE: ICD-10-CM

## 2025-03-25 DIAGNOSIS — Z11.59 ENCOUNTER FOR HEPATITIS C SCREENING TEST FOR LOW RISK PATIENT: ICD-10-CM

## 2025-03-25 DIAGNOSIS — I10 PRIMARY HYPERTENSION: Primary | ICD-10-CM

## 2025-03-25 DIAGNOSIS — G44.52 NEW DAILY PERSISTENT HEADACHE: ICD-10-CM

## 2025-03-25 LAB
ALBUMIN SERPL-MCNC: 4.3 G/DL (ref 3.5–5.2)
ALBUMIN/GLOB SERPL: 1.3 G/DL
ALP SERPL-CCNC: 64 U/L (ref 39–117)
ALT SERPL W P-5'-P-CCNC: 26 U/L (ref 1–33)
ANION GAP SERPL CALCULATED.3IONS-SCNC: 12 MMOL/L (ref 5–15)
AST SERPL-CCNC: 29 U/L (ref 1–32)
BILIRUB SERPL-MCNC: 0.3 MG/DL (ref 0–1.2)
BUN SERPL-MCNC: 14 MG/DL (ref 6–20)
BUN/CREAT SERPL: 12.8 (ref 7–25)
CALCIUM SPEC-SCNC: 9.6 MG/DL (ref 8.6–10.5)
CHLORIDE SERPL-SCNC: 103 MMOL/L (ref 98–107)
CO2 SERPL-SCNC: 28 MMOL/L (ref 22–29)
CREAT SERPL-MCNC: 1.09 MG/DL (ref 0.57–1)
EGFRCR SERPLBLD CKD-EPI 2021: 61.3 ML/MIN/1.73
GLOBULIN UR ELPH-MCNC: 3.2 GM/DL
GLUCOSE SERPL-MCNC: 110 MG/DL (ref 65–99)
HAV IGM SERPL QL IA: NORMAL
HBV CORE IGM SERPL QL IA: NORMAL
HBV SURFACE AG SERPL QL IA: NORMAL
HCV AB SER QL: NORMAL
POTASSIUM SERPL-SCNC: 3.8 MMOL/L (ref 3.5–5.2)
PROT SERPL-MCNC: 7.5 G/DL (ref 6–8.5)
SODIUM SERPL-SCNC: 143 MMOL/L (ref 136–145)

## 2025-03-25 PROCEDURE — 3074F SYST BP LT 130 MM HG: CPT | Performed by: FAMILY MEDICINE

## 2025-03-25 PROCEDURE — 80074 ACUTE HEPATITIS PANEL: CPT | Performed by: FAMILY MEDICINE

## 2025-03-25 PROCEDURE — 3079F DIAST BP 80-89 MM HG: CPT | Performed by: FAMILY MEDICINE

## 2025-03-25 PROCEDURE — 36415 COLL VENOUS BLD VENIPUNCTURE: CPT | Performed by: FAMILY MEDICINE

## 2025-03-25 PROCEDURE — 99214 OFFICE O/P EST MOD 30 MIN: CPT | Performed by: FAMILY MEDICINE

## 2025-03-25 PROCEDURE — 1125F AMNT PAIN NOTED PAIN PRSNT: CPT | Performed by: FAMILY MEDICINE

## 2025-03-25 PROCEDURE — 80053 COMPREHEN METABOLIC PANEL: CPT | Performed by: FAMILY MEDICINE

## 2025-03-25 RX ORDER — PANTOPRAZOLE SODIUM 40 MG/1
40 TABLET, DELAYED RELEASE ORAL DAILY
Qty: 90 TABLET | Refills: 1 | Status: SHIPPED | OUTPATIENT
Start: 2025-03-25

## 2025-03-25 RX ORDER — LISINOPRIL AND HYDROCHLOROTHIAZIDE 20; 25 MG/1; MG/1
1 TABLET ORAL DAILY
Qty: 90 TABLET | Refills: 1 | Status: SHIPPED | OUTPATIENT
Start: 2025-03-25

## 2025-03-25 NOTE — ASSESSMENT & PLAN NOTE
Patient [place] [facility].  Per the CDC.gov site she needs to be up to date on routine recommended vaccines like MMR, DTP, Shingles, COVID, Hep A and B.  Malaria prophylaxis with doxycycline and Typhoid vaccine (Vivotif) need to be done.  Avoid contaminated water and food.  Avoid mosquito bites.    She plans to contact the [facility] about Tdap, Hep A and possible MMR vaccines.

## 2025-03-25 NOTE — PROGRESS NOTES
"Chief Complaint  Liver Follow-up (Itching for a few years. Fatigue. Did some research and some symptoms that she has is related to the liver. She would like to have her liver checked.) and Cyst (Near right ear, new referral for new location?)    Subjective        Aisha Appiah presents to [facility]  Fatigue  Symptoms are chronic.   Onset was 6 to12 months.   Symptoms occur constantly.   Symptoms have been unchanged since onset.   Symptoms include fatigue.    Pertinent negative symptoms include no abdominal pain, no anorexia, no joint pain, no chest pain, no chills, no cough, no fever and no rash.   Aggravating factors include nothing.   Treatments tried include nothing.   Cyst  Today's concern : She was seen by dermatology, after waiting for months for an appointment, but says they were not interested in removing the cyst from the right side of her face.  She is very interested in getting it removed since it is gradually getting larger and more bothersome. .   Symptoms are new.   Onset was 1 to 6 months.   Symptoms occur constantly.   Symptoms have been unchanged since onset.   Symptoms include fatigue.    Pertinent negative symptoms include no abdominal pain, no anorexia, no joint pain, no chest pain, no chills, no cough, no fever and no rash.   Aggravating factors include nothing.   Hypertension  This is a chronic problem. The current episode started more than 1 year ago. The problem is unchanged. The problem is controlled. Pertinent negatives include no chest pain, peripheral edema or shortness of breath. There are no associated agents to hypertension.       Objective   Vital Signs:  /89 (BP Location: Left arm, Patient Position: Sitting, Cuff Size: Large Adult)   Pulse 86   Temp 98.4 °F (36.9 °C) (Infrared)   Resp 16   Ht 170.2 cm (67.01\")   Wt 110 kg (241 lb 11.2 oz)   SpO2 96%   BMI 37.85 kg/m²   Estimated body mass index is 37.85 kg/m² as calculated from the " "following:    Height as of this encounter: 170.2 cm (67.01\").    Weight as of this encounter: 110 kg (241 lb 11.2 oz).            Physical Exam  Vitals and nursing note reviewed.   Constitutional:       General: She is not in acute distress.     Appearance: She is well-developed.   HENT:      Head: Normocephalic.   Eyes:      General: Lids are normal.      Conjunctiva/sclera: Conjunctivae normal.   Neck:      Thyroid: No thyroid mass or thyromegaly.      Trachea: Trachea normal.   Cardiovascular:      Rate and Rhythm: Normal rate and regular rhythm.      Heart sounds: Normal heart sounds.   Pulmonary:      Effort: Pulmonary effort is normal.      Breath sounds: Normal breath sounds.   Abdominal:      Palpations: Abdomen is soft.   Musculoskeletal:      Cervical back: Normal range of motion.   Lymphadenopathy:      Cervical: No cervical adenopathy.   Skin:     General: Skin is warm and dry.      Comments: Right preauricular cystic mass approx 1.5 cm   Neurological:      Mental Status: She is alert and oriented to person, place, and time.   Psychiatric:         Attention and Perception: She is attentive.         Mood and Affect: Mood normal.         Speech: Speech normal.         Behavior: Behavior normal.        Result Review :  The following data was reviewed by: Ariane Beaulieu MD on 03/25/2025:  Common labs          6/11/2024    10:52   Common Labs   Glucose 92    BUN 11    Creatinine 0.91    Sodium 142    Potassium 3.9    Chloride 106    Calcium 9.0    Albumin 4.0    Total Bilirubin 0.3    Alkaline Phosphatase 49    AST (SGOT) 25    ALT (SGPT) 24    WBC 5.24    Hemoglobin 12.7    Hematocrit 37.3    Platelets 207    Total Cholesterol 266    Triglycerides 86    HDL Cholesterol 77    LDL Cholesterol  175    Hemoglobin A1C 6.00    Microalbumin, Urine <1.2                Assessment and Plan   Diagnoses and all orders for this visit:    1. Primary hypertension (Primary)  Assessment & Plan:  Hypertension is stable and " controlled  Continue current treatment regimen.  Blood pressure will be reassessed in 6 months.    Orders:  -     lisinopril-hydrochlorothiazide (PRINZIDE,ZESTORETIC) 20-25 MG per tablet; Take 1 tablet by mouth Daily.  Dispense: 90 tablet; Refill: 1    2. New daily persistent headache  -     rimegepant sulfate ODT (Nurtec) 75 MG disintegrating tablet; Place 1 tablet under the tongue Every Other Day As Needed (headache).  Dispense: 8 tablet; Refill: 5    3. Encounter for hepatitis C screening test for low risk patient  -     Comprehensive Metabolic Panel  -     Hepatitis panel, acute; Future    4. Epidermal cyst of face  -     Ambulatory Referral to General Surgery    5. Travel advice encounter  Assessment & Plan:  Patient plans to travel to [facility] in August 2025.  Per the CDC.gov site she needs to be up to date on routine recommended vaccines like MMR, DTP, Shingles, COVID, Hep A and B.  Malaria prophylaxis with doxycycline and Typhoid vaccine (Vivotif) need to be done.  Avoid contaminated water and food.  Avoid mosquito bites.    She plans to contact the [facility] about Tdap, Hep A and possible MMR vaccines.       Other orders  -     pantoprazole (PROTONIX) 40 MG EC tablet; Take 1 tablet by mouth Daily.  Dispense: 90 tablet; Refill: 1             Follow Up   No follow-ups on file.  Patient was given instructions and counseling regarding her condition or for health maintenance advice. Please see specific information pulled into the AVS if appropriate.

## 2025-03-27 ENCOUNTER — TELEPHONE (OUTPATIENT)
Dept: FAMILY MEDICINE CLINIC | Facility: CLINIC | Age: 55
End: 2025-03-27
Payer: MEDICAID

## 2025-03-27 ENCOUNTER — PRIOR AUTHORIZATION (OUTPATIENT)
Dept: FAMILY MEDICINE CLINIC | Facility: CLINIC | Age: 55
End: 2025-03-27
Payer: MEDICAID

## 2025-03-27 NOTE — TELEPHONE ENCOUNTER
PA FOR NURTE WAS SENT IN [facility] TO ,[facility].    KEY: [id number]    03/27/25   Erick Esquivel MA

## 2025-03-27 NOTE — TELEPHONE ENCOUNTER
THIS MESSAGE CAME BACK FROM THE GENERAL SURGERY REFERRAL YOU DID. PLEASE LET ME KNOW WHAT YOU WANT ME TO DO.      PATIENT NEEDS TO SEE DERMATOLOGY OR PLASTICS. THANKS.

## 2025-03-28 NOTE — TELEPHONE ENCOUNTER
She has already been to dermatology.  She waited for months to get in to see them and then they did not want to remove the cyst.      Please let the patient know that I can refer her to see a plastic surgeon but that it is likely not covered by her insurance.

## 2025-03-31 NOTE — TELEPHONE ENCOUNTER
CALLED AND SPOKE WITH PT    SHE WILL CONTACT HER DERMATOLOGY OFFICE IN [place] AND ASK THEM IF THEY REMOVE.

## 2025-03-31 NOTE — TELEPHONE ENCOUNTER
[facility]    DR. OCHOA AND PATIENT NOTIFIED.    Erick Esquivel MA  03/31/25

## 2025-04-14 ENCOUNTER — PATIENT MESSAGE (OUTPATIENT)
Dept: FAMILY MEDICINE CLINIC | Facility: CLINIC | Age: 55
End: 2025-04-14
Payer: MEDICAID

## 2025-04-28 RX ORDER — HYDROXYZINE HYDROCHLORIDE 25 MG/1
TABLET, FILM COATED ORAL
Qty: 30 TABLET | Refills: 0 | OUTPATIENT
Start: 2025-04-28

## 2025-04-28 NOTE — TELEPHONE ENCOUNTER
Caller: LO     Relationship: SELF    Best call back number: [phone]    Requested Prescriptions:   Requested Prescriptions     Pending Prescriptions Disp Refills    hydrOXYzine (ATARAX) 25 MG tablet 30 tablet 0        Pharmacy where request should be sent: [facility] [id number] - [place], [place] - [address] AT [facility] [address] - [phone] [facility] [phone] FX     Last office visit with prescribing clinician: 1/20/2025   Last telemedicine visit with prescribing clinician: Visit date not found   Next office visit with prescribing clinician: Visit date not found     Additional details provided by patient:     Does the patient have less than a 3 day supply:  [x] Yes  [] No    Would you like a call back once the refill request has been completed: [x] Yes [] No    If the office needs to give you a call back, can they leave a voicemail: [x] Yes [] No    Maribell Salguero, PCT   04/28/25 09:41 EDT

## 2025-04-29 ENCOUNTER — TELEPHONE (OUTPATIENT)
Dept: PAIN MEDICINE | Facility: CLINIC | Age: 55
End: 2025-04-29
Payer: MEDICAID

## 2025-04-29 DIAGNOSIS — M54.16 LUMBAR RADICULOPATHY: Primary | ICD-10-CM

## 2025-04-29 DIAGNOSIS — M47.816 LUMBAR SPONDYLOSIS: ICD-10-CM

## 2025-04-29 RX ORDER — HYDROCODONE BITARTRATE AND ACETAMINOPHEN 5; 325 MG/1; MG/1
1 TABLET ORAL 3 TIMES DAILY PRN
Qty: 90 TABLET | Refills: 0 | Status: SHIPPED | OUTPATIENT
Start: 2025-04-29 | End: 2025-05-29

## 2025-04-29 NOTE — TELEPHONE ENCOUNTER
Caller: Aisha Appiah    Relationship: Self    Best call back number: [phone]    Requested Prescriptions:   HYDROCODONE     Pharmacy where request should be sent: [facility] [id number] - [place], [place] - [address] AT [facility] [address] - [phone]  - [phone] FX     Last office visit with prescribing clinician: 1/20/2025   Last telemedicine visit with prescribing clinician: Visit date not found   Next office visit with prescribing clinician: Visit date not found     Additional details provided by patient: PT STATES THE RX FROM 04/28 SHOULD HAVE BEEN HYDROCODONE. PLEASE CONTACT PT TO CONFIRM THIS HAS BEEN FILLED. PT RAN OUT YESTERDAY.     Does the patient have less than a 3 day supply:  [x] Yes  [] No    Would you like a call back once the refill request has been completed: [x] Yes [] No    If the office needs to give you a call back, can they leave a voicemail: [x] Yes [] No    Blaze Benson   04/29/25 12:15 EDT

## 2025-04-29 NOTE — TELEPHONE ENCOUNTER
Spoke w pt. She stated she didn't realize she needed to call and make appt after MRI. Put her on for your next available 5/29/25.  Imaging has been requested from [facility]  INSPECT in chart

## 2025-05-27 RX ORDER — GABAPENTIN 300 MG/1
300 CAPSULE ORAL 3 TIMES DAILY
Qty: 90 CAPSULE | Refills: 1 | Status: CANCELLED | OUTPATIENT
Start: 2025-05-27 | End: 2025-07-26

## 2025-05-27 NOTE — PROGRESS NOTES
Tyesha Appiah is a 54 y.o. female here for follow up for lower back pain.  Patient was last seen on 1/20/2025.  Lumbar MRI was ordered on last visit at [facility]. She hasn't done MRI yet.     On last visit: Started gabapentin- takes it at night time.      Lower back pain is 5/10 on VAS, at maximum is 6/10. Pain is aching, pressure, throbbing, burning, sharp, tingling, soreness, spasms, numbness, stabbing in nature. Pain is referred left buttock, left anterior thigh and left leg.  The pain is constant. The pain is improved by tylenol PM. The pain is worse with any increased activities, working and being in 1 position with sitting or standing.  Affecting her sleep and ability to work.  X-ray lower back pain is significantly improved after RFA.     Chronic mostly daily migraines in the base of her neck radiating to top of her head.  Scheduled for MRI of her head.     Previous Injection:   12/26/2024-bilateral RFA L4-SA-80 to 90% pain relief with functional improvement.    Hx: Referred by Dr. Beaulieu, Ariane Goetz MD for lower back pain. She had chronic lower back pain for long time but significantly worsened with being constant for last 1 month. She has been seeing orthopedics for bilateral knee gel and steroids injections and bilateral shoulder pain.  She was diagnosed with strain of deltoid muscle in 2021 by Dr. Solis and was recommended biomed cream. She has tried multiple NSAIDs, tylenol, lidoderm patches, baclofen without much relief.         PHQ-9- 10                     SOAPP- 2  [place] back disability scale - 56     PMH:   Depression, hypertension, bilateral knee pain     Current Medications:   Baclofen 10 mg  Diclofenac 50 mg  Voltaren 1% gel  Lidoderm patches 5%  Nurtec        Past Medications:  Meloxicam didn't help  Diclofeanc        Past Modalities:  TENS:                                                                          no                                                  Physical  Therapy Within The Last 6 Months              Yes- [facility] - 2 months finished early 2024. Some relief; she has been doing home exercises daily.   Psychotherapy                                                            no  Massage Therapy                                                       no     Patient Complains Of:  Uro-Fecal Incontinence          no  Weight Gain/Loss                   no  Fever/Chills                             no  Weakness                               no        PEG Assessment   What number best describes your pain on average in the past week?7  What number best describes how, during the past week, pain has interfered with your enjoyment of life?7  What number best describes how, during the past week, pain has interfered with your general activity?  7           Current Outpatient Medications:     atorvastatin (LIPITOR) 20 MG tablet, Take 1 tablet by mouth Every Night., Disp: 90 tablet, Rfl: 1    baclofen (LIORESAL) 10 MG tablet, Take 1 tablet by mouth 3 (Three) Times a Day., Disp: 30 tablet, Rfl: 0    cetirizine (zyrTEC) 10 MG tablet, Take 1 tablet by mouth Daily., Disp: 30 tablet, Rfl: 2    [START ON 6/2/2025] HYDROcodone-acetaminophen (NORCO) 5-325 MG per tablet, Take 1 tablet by mouth 2 (Two) Times a Day As Needed for Severe Pain for up to 30 days., Disp: 60 tablet, Rfl: 0    [START ON 7/2/2025] HYDROcodone-acetaminophen (NORCO) 5-325 MG per tablet, Take 1 tablet by mouth 2 (Two) Times a Day As Needed for Severe Pain for up to 30 days., Disp: 60 tablet, Rfl: 0    [START ON 8/1/2025] HYDROcodone-acetaminophen (NORCO) 5-325 MG per tablet, Take 1 tablet by mouth 2 (Two) Times a Day As Needed for Severe Pain for up to 30 days., Disp: 60 tablet, Rfl: 0    hydrOXYzine (ATARAX) 25 MG tablet, TAKE 1 TABLET BY MOUTH 3 TIMES A DAY AS NEEDED FOR ITCHING, Disp: 30 tablet, Rfl: 0    lisinopril (PRINIVIL,ZESTRIL) 10 MG tablet, Take 1 tablet by mouth Daily., Disp: , Rfl:      lisinopril-hydrochlorothiazide (PRINZIDE,ZESTORETIC) 20-25 MG per tablet, Take 1 tablet by mouth Daily., Disp: 90 tablet, Rfl: 1    meloxicam (MOBIC) 15 MG tablet, TAKE 1 TABLET BY MOUTH DAILY, Disp: 90 tablet, Rfl: 1    pantoprazole (PROTONIX) 40 MG EC tablet, Take 1 tablet by mouth Daily., Disp: 90 tablet, Rfl: 1    rimegepant sulfate ODT (Nurtec) 75 MG disintegrating tablet, Place 1 tablet under the tongue Every Other Day As Needed (headache)., Disp: 8 tablet, Rfl: 5    gabapentin (NEURONTIN) 300 MG capsule, Take 1 capsule by mouth 3 times a day for 60 days., Disp: 90 capsule, Rfl: 1    The following portions of the patient's history were reviewed and updated as appropriate: allergies, current medications, past family history, past medical history, past social history, past surgical history, and problem list.      REVIEW OF PERTINENT MEDICAL DATA    Past Medical History:   Diagnosis Date    Anxiety     Arthritis     Back pain     High cholesterol     Hypertension     Shoulder pain, bilateral      Past Surgical History:   Procedure Laterality Date    TUBAL ABDOMINAL LIGATION       Family History   Problem Relation Age of Onset    COPD Mother     No Known Problems Father      Social History     Socioeconomic History    Marital status: Single   Tobacco Use    Smoking status: Former     Current packs/day: 0.00     Average packs/day: 1 pack/day for 17.2 years (17.2 ttl pk-yrs)     Types: Cigarettes     Quit date:      Years since quittin.4    Smokeless tobacco: Never    Tobacco comments:     quit 13 yrs ago   Vaping Use    Vaping status: Never Used   Substance and Sexual Activity    Alcohol use: Yes     Comment: caffeine 1c qd    Drug use: Defer    Sexual activity: Defer         Review of Systems   Musculoskeletal:  Positive for arthralgias.         Vitals:    25 1506   BP: 165/90   Pulse: 73   Resp: 16   SpO2: 97%   Weight: 112 kg (248 lb)   PainSc: 5          Objective   Physical  Exam  Musculoskeletal:        Legs:            Imaging Reviewed:  Lumbar x-ray-6/24/2024  - Advanced bilateral L5-S1 facet arthropathy with grade 1 anterolisthesis of L5 on S1  - Degenerative sclerotic changes of right SI joint inferiorly     Right shoulder x-ray-2021  - Mild to moderate AC joint osteoarthropathy    Assessment:    1. High risk medication use    2. Lumbar radiculopathy    3. Lumbar spondylosis    4. Occipital neuralgia, unspecified laterality    5. Sacroiliac joint dysfunction             Plan:   1.  Repeat UDS-5/29/2025.  UDS on 7/1/2024 is consistent with patient's interview and negative for any drugs not prescribed.  Narcotic contract was signed on 7/1/2024.  2. We discussed trying a course of formal physical therapy.  Physical therapy can help strengthen and stretch the muscles around the joints. Continue to be as active as possible.  Patient has done 6 of physical therapy with moderate improvement  3.  Patient has failed meloxicam, tramadol and diclofenac.  For now, continue Norco 5-325 mg BID PRN (6/2; 7/2; 8/1). Discussed with the patient regarding long-term side effects of opioids including but not limited to opioid induced hormonal suppression, hyperalgesia, fatigue, weight gain, possible opioid induced altered immune system, addiction, tolerance, dependence, risk of hearing loss and elevated risk of myocardial infarction. Proper use and potential life threatening side effects of over use discussed with patient. Patient states understanding of their use and risks.  Opioid Education for patient's receiving narcotics for pain: Patient was instructed regarding the risks, benefits, alternative forms of treatment, as well as potential development of tolerance and/or addiction. Patient was instructed to take the medication strictly as ordered, not to share the medication with others, not to drive while taking opioids, and to take no other sedatives/pain medications or alcohol while taking this  prescription. The patient was instructed to wean off of the pain medication as healing occurs and pain resolves.   4.  Due to radicular nerve pain, continue Gabapentin 300 mg qhs. Unable to increase due to drowsiness. Side effects discussed with the patient including but not limited to somnolence, dizziness, ataxia, headache, fatigue, blurred vision, cold or flu-like symptoms,delusions, hoarseness, lack or loss of strength, lower back or side pain, swelling of the hands, feet, or lower legs trembling or shaking. Patient understands and agrees with the plan.  5.  Her migraines are consistent with occipital neuralgia pain.  May consider bilateral lesser occipital nerve blocks in future.  Recommend getting imaging done for head to rule out other causes.  6.  Excellent pain relief with bilateral L4-SA RFA for axial lower back pain.  Her main complaint is left leg radiculopathy which is new from previous exam.  Concerned about nerve impingement or spinal canal stenosis.  Will obtain lumbar MRI without contrast at open MRI as patient is claustrophobic.  Sent to [facility] again on 5/28/25. Will consider LESI after reviewing MRI.      RTC after MRI or in 3 months.      Yonas Toussaint DO  Pain Management   [facility]           INSPECT REPORT    As part of the patient's treatment plan, I may be prescribing controlled substances. The patient has been made aware of appropriate use of such medications, including potential risk of somnolence, limited ability to drive and/or work safely, and the potential for dependence or overdose. It has also been made clear that these medications are for use by this patient only, without concomitant use of alcohol or other substances unless prescribed.     Patient has completed prescribing agreement detailing terms of continued prescribing of controlled substances, including monitoring INSPECT reports, urine drug screening, and pill counts if necessary. The patient is aware that inappropriate  use will results in cessation of prescribing such medications.    INSPECT report has been reviewed and scanned into the patient's chart.

## 2025-05-28 DIAGNOSIS — G89.29 CHRONIC LEFT-SIDED LOW BACK PAIN WITH LEFT-SIDED SCIATICA: ICD-10-CM

## 2025-05-28 DIAGNOSIS — J30.1 ALLERGIC RHINITIS DUE TO POLLEN, UNSPECIFIED SEASONALITY: ICD-10-CM

## 2025-05-28 DIAGNOSIS — M54.42 CHRONIC LEFT-SIDED LOW BACK PAIN WITH LEFT-SIDED SCIATICA: ICD-10-CM

## 2025-05-29 ENCOUNTER — OFFICE VISIT (OUTPATIENT)
Dept: PAIN MEDICINE | Facility: CLINIC | Age: 55
End: 2025-05-29
Payer: MEDICAID

## 2025-05-29 VITALS
SYSTOLIC BLOOD PRESSURE: 165 MMHG | BODY MASS INDEX: 38.83 KG/M2 | HEART RATE: 73 BPM | DIASTOLIC BLOOD PRESSURE: 90 MMHG | OXYGEN SATURATION: 97 % | WEIGHT: 248 LBS | RESPIRATION RATE: 16 BRPM

## 2025-05-29 DIAGNOSIS — M47.816 LUMBAR SPONDYLOSIS: ICD-10-CM

## 2025-05-29 DIAGNOSIS — Z79.899 HIGH RISK MEDICATION USE: Primary | ICD-10-CM

## 2025-05-29 DIAGNOSIS — M54.16 LUMBAR RADICULOPATHY: ICD-10-CM

## 2025-05-29 DIAGNOSIS — M54.81 OCCIPITAL NEURALGIA, UNSPECIFIED LATERALITY: ICD-10-CM

## 2025-05-29 DIAGNOSIS — M53.3 SACROILIAC JOINT DYSFUNCTION: ICD-10-CM

## 2025-05-29 RX ORDER — LISINOPRIL 10 MG/1
10 TABLET ORAL DAILY
COMMUNITY

## 2025-05-29 RX ORDER — HYDROCODONE BITARTRATE AND ACETAMINOPHEN 5; 325 MG/1; MG/1
1 TABLET ORAL 2 TIMES DAILY PRN
Qty: 60 TABLET | Refills: 0 | Status: SHIPPED | OUTPATIENT
Start: 2025-07-02 | End: 2025-08-01

## 2025-05-29 RX ORDER — HYDROCODONE BITARTRATE AND ACETAMINOPHEN 5; 325 MG/1; MG/1
1 TABLET ORAL 2 TIMES DAILY PRN
Qty: 60 TABLET | Refills: 0 | Status: SHIPPED | OUTPATIENT
Start: 2025-06-02 | End: 2025-07-02

## 2025-05-29 RX ORDER — HYDROCODONE BITARTRATE AND ACETAMINOPHEN 5; 325 MG/1; MG/1
1 TABLET ORAL 2 TIMES DAILY PRN
Qty: 60 TABLET | Refills: 0 | Status: SHIPPED | OUTPATIENT
Start: 2025-08-01 | End: 2025-08-31

## 2025-05-30 RX ORDER — CETIRIZINE HYDROCHLORIDE 10 MG/1
10 TABLET ORAL DAILY
Qty: 30 TABLET | Refills: 2 | Status: SHIPPED | OUTPATIENT
Start: 2025-05-30

## 2025-05-30 RX ORDER — HYDROXYZINE HYDROCHLORIDE 25 MG/1
25 TABLET, FILM COATED ORAL EVERY 8 HOURS PRN
Qty: 30 TABLET | Refills: 2 | Status: SHIPPED | OUTPATIENT
Start: 2025-05-30

## 2025-05-30 RX ORDER — MELOXICAM 15 MG/1
15 TABLET ORAL DAILY
Qty: 90 TABLET | Refills: 1 | Status: SHIPPED | OUTPATIENT
Start: 2025-05-30

## 2025-06-24 ENCOUNTER — TELEPHONE (OUTPATIENT)
Dept: FAMILY MEDICINE CLINIC | Facility: CLINIC | Age: 55
End: 2025-06-24

## 2025-06-24 NOTE — TELEPHONE ENCOUNTER
"    Caller: Aisha Appiah \"Aisha Appiah\"    Relationship to patient: Self    Best call back number: [phone]    Chief complaint: PHYSICAL APPT     Type of visit: PHYSICAL    Requested date: MONDAY OR TUESDAY MID MORNINGS      "

## 2025-06-26 ENCOUNTER — APPOINTMENT (OUTPATIENT)
Dept: CARDIOLOGY | Facility: HOSPITAL | Age: 55
End: 2025-06-26
Payer: MEDICAID

## 2025-06-26 ENCOUNTER — TELEPHONE (OUTPATIENT)
Dept: PAIN MEDICINE | Facility: CLINIC | Age: 55
End: 2025-06-26
Payer: MEDICAID

## 2025-06-26 ENCOUNTER — HOSPITAL ENCOUNTER (EMERGENCY)
Facility: HOSPITAL | Age: 55
Discharge: HOME | End: 2025-06-26
Attending: EMERGENCY MEDICINE
Payer: MEDICAID

## 2025-06-26 VITALS
HEIGHT: 67 IN | SYSTOLIC BLOOD PRESSURE: 120 MMHG | WEIGHT: 243.61 LBS | TEMPERATURE: 98.1 F | DIASTOLIC BLOOD PRESSURE: 74 MMHG | BODY MASS INDEX: 38.24 KG/M2 | RESPIRATION RATE: 16 BRPM | HEART RATE: 79 BPM | OXYGEN SATURATION: 95 %

## 2025-06-26 DIAGNOSIS — M79.605 LEG PAIN, BILATERAL: Primary | ICD-10-CM

## 2025-06-26 DIAGNOSIS — Z87.39 HISTORY OF DEGENERATIVE JOINT DISEASE: ICD-10-CM

## 2025-06-26 DIAGNOSIS — M79.604 LEG PAIN, BILATERAL: Primary | ICD-10-CM

## 2025-06-26 LAB

## 2025-06-26 PROCEDURE — 93970 EXTREMITY STUDY: CPT | Performed by: SURGERY

## 2025-06-26 PROCEDURE — 93970 EXTREMITY STUDY: CPT

## 2025-06-26 PROCEDURE — 99284 EMERGENCY DEPT VISIT MOD MDM: CPT

## 2025-06-26 RX ORDER — HYDROCODONE BITARTRATE AND ACETAMINOPHEN 10; 325 MG/1; MG/1
1 TABLET ORAL EVERY 6 HOURS PRN
Qty: 10 TABLET | Refills: 0 | Status: SHIPPED | OUTPATIENT
Start: 2025-06-26

## 2025-06-26 RX ORDER — HYDROCODONE BITARTRATE AND ACETAMINOPHEN 10; 325 MG/1; MG/1
1 TABLET ORAL EVERY 6 HOURS PRN
Refills: 0 | Status: DISCONTINUED | OUTPATIENT
Start: 2025-06-26 | End: 2025-06-26 | Stop reason: HOSPADM

## 2025-06-26 RX ORDER — OXYCODONE HYDROCHLORIDE 5 MG/1
5 TABLET ORAL EVERY 4 HOURS PRN
Refills: 0 | Status: DISCONTINUED | OUTPATIENT
Start: 2025-06-26 | End: 2025-06-26 | Stop reason: HOSPADM

## 2025-06-26 RX ORDER — METAXALONE 800 MG/1
800 TABLET ORAL 3 TIMES DAILY PRN
Qty: 15 TABLET | Refills: 0 | Status: SHIPPED | OUTPATIENT
Start: 2025-06-26

## 2025-06-26 RX ORDER — METHYLPREDNISOLONE 4 MG/1
TABLET ORAL
Qty: 1 EACH | Refills: 0 | Status: SHIPPED | OUTPATIENT
Start: 2025-06-26

## 2025-06-26 RX ADMIN — OXYCODONE 5 MG: 5 TABLET ORAL at 16:25

## 2025-06-26 RX ADMIN — HYDROCODONE BITARTRATE AND ACETAMINOPHEN 1 TABLET: 10; 325 TABLET ORAL at 19:54

## 2025-06-26 NOTE — TELEPHONE ENCOUNTER
Spoke with patient she states the Hydrocodone has just stopped working, she is having severe back pain with the pain shooting down her legs. She is currently on her was to [facility] for an eval but wants to know if  it is all possible to get something else ordered for her pain.

## 2025-06-26 NOTE — ED PROVIDER NOTES
Subjective   History of Present Illness  54-year-old female complaining of 5-day history of increasing lower extremity pain.  She states that they feel swollen to her.  She states that she is taking more than her prescribed 2 Lortab fives daily without relief of pain the patient reports that she is having to use braces and she feels like her legs are more swollen with the braces.  She does not report knees instability.  The pain does not appear to be sciatic.  She reports no change in bowel or bladder habits.  She reports no calf pain.  Patient states that she was told in the past that she did not have rheumatoid arthritis      Review of Systems    Past Medical History:   Diagnosis Date    Anxiety     Arthritis     Back pain     High cholesterol     Hypertension     Shoulder pain, bilateral        No Known Allergies    Past Surgical History:   Procedure Laterality Date    TUBAL ABDOMINAL LIGATION         Family History   Problem Relation Age of Onset    COPD Mother     No Known Problems Father        Social History     Socioeconomic History    Marital status:    Tobacco Use    Smoking status: Former     Current packs/day: 0.00     Average packs/day: 1 pack/day for 17.2 years (17.2 ttl pk-yrs)     Types: Cigarettes     Quit date:      Years since quittin.4    Smokeless tobacco: Never    Tobacco comments:     quit 13 yrs ago   Vaping Use    Vaping status: Never Used   Substance and Sexual Activity    Alcohol use: Yes     Comment: caffeine 1c qd    Drug use: Defer    Sexual activity: Defer           Objective   Physical Exam  Alert nontoxic Lucinda Coma Scale 15  Back: There is diffuse lumbar paraspinal discomfort but no point tenderness.  There is no SI joint discomfort negative straight leg raising test  Lower extremity: No definite knee effusion is apparent there is no instability or drawer Ramos's Lachman's Mikhail's test are all negative there is no medial or lateral collateral discomfort with  stress the patient does report diffuse lower leg discomfort both anterior and posterior that is worse with standing  Procedures           ED Course      Labs Reviewed - No data to display  Medications   oxyCODONE (ROXICODONE) immediate release tablet 5 mg (5 mg Oral Given 6/26/25 5038)     Ultrasound negative                                                   Medical Decision Making  The patient will be placed on methylprednisolone pack, Skelaxin, hydrocodone 10 quantity 12.  The patient complained extensively of discomfort in the emergency department and this was treated with oral medication.  She was encouraged follow-up with an orthopedist and stated that she had 1 to see.  The patient was stable at discharge and vocalized understanding of discharge instructions worn    Risk  Prescription drug management.        Final diagnoses:   Leg pain, bilateral   History of degenerative joint disease       ED Disposition  ED Disposition       ED Disposition   Discharge    Condition   Stable    Comment   --               Ariane Beaulieu MD  [address] [address]  [phone]          Orthopedist  On-call orthopedist [phone]             Medication List        New Prescriptions      metaxalone 800 MG tablet  Commonly known as: SKELAXIN  Take 1 tablet by mouth 3 (Three) Times a Day As Needed for Muscle Spasms (And pain).     methylPREDNISolone 4 MG dose pack  Commonly known as: MEDROL  Take as directed on package instructions.            Changed      * HYDROcodone-acetaminophen 5-325 MG per tablet  Commonly known as: NORCO  Take 1 tablet by mouth 2 (Two) Times a Day As Needed for Severe Pain for up to 30 days.  What changed: Another medication with the same name was added. Make sure you understand how and when to take each.     * HYDROcodone-acetaminophen  MG per tablet  Commonly known as: NORCO  Take 1 tablet by mouth Every 6 (Six) Hours As Needed for Moderate Pain or Severe Pain.  What  changed: You were already taking a medication with the same name, and this prescription was added. Make sure you understand how and when to take each.     * HYDROcodone-acetaminophen 5-325 MG per tablet  Commonly known as: NORCO  Take 1 tablet by mouth 2 (Two) Times a Day As Needed for Severe Pain for up to 30 days.  Start taking on: July 2, 2025  What changed: Another medication with the same name was added. Make sure you understand how and when to take each.     * HYDROcodone-acetaminophen 5-325 MG per tablet  Commonly known as: NORCO  Take 1 tablet by mouth 2 (Two) Times a Day As Needed for Severe Pain for up to 30 days.  Start taking on: August 1, 2025  What changed: Another medication with the same name was added. Make sure you understand how and when to take each.           * This list has 4 medication(s) that are the same as other medications prescribed for you. Read the directions carefully, and ask your doctor or other care provider to review them with you.                   Where to Get Your Medications        These medications were sent to [facility] [id number] - [place], [place] - [address] AT [facility] [address] - [phone]  - [phone] FX  [address] [place] [address] [place] [address]      Phone: [phone]   HYDROcodone-acetaminophen  MG per tablet  metaxalone 800 MG tablet  methylPREDNISolone 4 MG dose pack            Wander Walters MD  06/26/25 0020

## 2025-06-26 NOTE — Clinical Note
[facility] EMERGENCY DEPARTMENT  [address] [place] [address]  Phone: [phone]    Aisha Appiah was seen and treated in our emergency department on 6/26/2025.  She may return to work on 06/27/2025.         Thank you for choosing [facility].    Cliare Chiu, RN

## 2025-06-26 NOTE — DISCHARGE INSTRUCTIONS
Medication as directed, dosage has increased    No prolonged walking or standing for the next 3 days    Follow-up with primary care provider or orthopedist

## 2025-06-26 NOTE — TELEPHONE ENCOUNTER
"Provider: DR ESCOBAR     Caller: Aisha Appiah \"Aisha Appiah\"    Relationship to Patient: Self    Pharmacy: Pharmacy: [id number] - [place], [place] - [address] AT [place] [address] - [phone] [facility] [phone] FX     Phone Number: [phone]    Reason for Call: PATIENT STATES THE HYDROCODONE 5-325 MGS IS \"LIKE CANDY\" AND IS NOT HELPING HER PAIN. SHE HAS BEEN IN SEVERE PAIN FOR THE LAST THREE DAYS WHILE CONTINUING TO WORK AND WOULD LIKE SOMETHING STRONGER SENT TO HER PHARMACY. PLEASE ADVISE.    When was the patient last seen: 5-29-25      "

## 2025-06-27 ENCOUNTER — TELEPHONE (OUTPATIENT)
Dept: PAIN MEDICINE | Facility: CLINIC | Age: 55
End: 2025-06-27
Payer: MEDICAID

## 2025-06-27 NOTE — TELEPHONE ENCOUNTER
"  Caller: BijalAisha alatorre \"Aisha Bijal\"    Relationship to patient: Self    Best call back number: [phone]    Chief complaint: BACK PAIN    Type of visit: FOLLOW UP    Requested date: ASAP    Additional notes: MS ZAMAN STATED SHE IS IN SEVERE PAIN AND WOULD LIKE TO SEE DR ESCOBAR AS SOON AS POSSIBLE. SHE STATED SHE CANNOT WAIT UNTIL AUGUST TO BE SEEN  "

## 2025-06-27 NOTE — TELEPHONE ENCOUNTER
"    Caller: BijalAisha \"Aisha Bijal\"    Relationship to patient: Self    Best call back number: [phone]    Patient is needing: MS ZAMAN IS REQUESTING SOMETHING STRONGER THAN THE HYDROcodone-acetaminophen (NORCO) 5-325 MG per tablet TO HELP HER PAIN.  "

## 2025-06-30 ENCOUNTER — OFFICE VISIT (OUTPATIENT)
Dept: PAIN MEDICINE | Facility: CLINIC | Age: 55
End: 2025-06-30
Payer: MEDICAID

## 2025-06-30 VITALS
BODY MASS INDEX: 37.43 KG/M2 | HEART RATE: 92 BPM | OXYGEN SATURATION: 99 % | SYSTOLIC BLOOD PRESSURE: 159 MMHG | DIASTOLIC BLOOD PRESSURE: 99 MMHG | WEIGHT: 239 LBS | RESPIRATION RATE: 16 BRPM

## 2025-06-30 DIAGNOSIS — M54.81 OCCIPITAL NEURALGIA, UNSPECIFIED LATERALITY: ICD-10-CM

## 2025-06-30 DIAGNOSIS — Z79.899 HIGH RISK MEDICATION USE: ICD-10-CM

## 2025-06-30 DIAGNOSIS — M47.816 LUMBAR SPONDYLOSIS: ICD-10-CM

## 2025-06-30 DIAGNOSIS — M54.16 LUMBAR RADICULOPATHY: Primary | ICD-10-CM

## 2025-06-30 DIAGNOSIS — M53.3 SACROILIAC JOINT DYSFUNCTION: ICD-10-CM

## 2025-06-30 RX ORDER — HYDROCODONE BITARTRATE AND ACETAMINOPHEN 7.5; 325 MG/1; MG/1
1 TABLET ORAL 2 TIMES DAILY PRN
Qty: 60 TABLET | Refills: 0 | Status: SHIPPED | OUTPATIENT
Start: 2025-07-08 | End: 2025-08-07

## 2025-06-30 RX ORDER — HYDROCODONE BITARTRATE AND ACETAMINOPHEN 7.5; 325 MG/1; MG/1
1 TABLET ORAL 2 TIMES DAILY PRN
Qty: 60 TABLET | Refills: 0 | Status: SHIPPED | OUTPATIENT
Start: 2025-08-07 | End: 2025-09-06

## 2025-06-30 RX ORDER — PREGABALIN 75 MG/1
75 CAPSULE ORAL 2 TIMES DAILY
Qty: 60 CAPSULE | Refills: 1 | Status: SHIPPED | OUTPATIENT
Start: 2025-06-30 | End: 2025-08-29

## 2025-06-30 NOTE — PROGRESS NOTES
Tyesha Appiah is a 54 y.o. female here for follow up for lower back pain.  Increased leg pain. ED visit on 6/26/25 for b/l leg and lower back pain. Lumbar MRI was ordered on last visit and hasn't been obtained yet. States that Norco 5-325 mg is not working. Medrol dose pack helped with pain that was prescribed by ER.     On last visit: Started gabapentin- takes it at night time. Makes her feel weird.      Lower back pain is 7/10 on VAS, at maximum is 8/10. Pain is aching, pressure, throbbing, burning, sharp, tingling, soreness, spasms, numbness, stabbing in nature. Pain is referred left buttock, left anterior thigh and left leg.  The pain is constant. The pain is improved by tylenol PM. The pain is worse with any increased activities, working and being in 1 position with sitting or standing.  Affecting her sleep and ability to work.  X-ray lower back pain is significantly improved after RFA. B/l leg numbness.      Chronic mostly daily migraines in the base of her neck radiating to top of her head.  Scheduled for MRI of her head.     Previous Injection:   12/26/2024-bilateral RFA L4-SA-80 to 90% pain relief with functional improvement.    Hx: Referred by Dr. Beaulieu, Ariane Goetz MD for lower back pain. She had chronic lower back pain for long time but significantly worsened with being constant for last 1 month. She has been seeing orthopedics for bilateral knee gel and steroids injections and bilateral shoulder pain.  She was diagnosed with strain of deltoid muscle in 2021 by Dr. Solis and was recommended biomed cream. She has tried multiple NSAIDs, tylenol, lidoderm patches, baclofen without much relief.         PHQ-9- 10                     SOAPP- 2  [place] back disability scale - 56     PMH:   Depression, hypertension, bilateral knee pain     Current Medications:   Norco 7.5-325 mg BID PRN  Baclofen 10 mg  Meloxicam  Voltaren 1% gel  Lidoderm patches 5%  Nurtec        Past Medications:  Meloxicam  didn't help  Diclofeanc   gabapentin - side effects     Past Modalities:  TENS:                                                                          no                                                  Physical Therapy Within The Last 6 Months              Yes- [facility] - 2 months finished early 2024. Some relief; she has been doing home exercises daily.   Psychotherapy                                                            no  Massage Therapy                                                       no     Patient Complains Of:  Uro-Fecal Incontinence          no  Weight Gain/Loss                   no  Fever/Chills                             no  Weakness                               no        PEG Assessment   What number best describes your pain on average in the past week?7  What number best describes how, during the past week, pain has interfered with your enjoyment of life?7  What number best describes how, during the past week, pain has interfered with your general activity?  7           Current Outpatient Medications:     atorvastatin (LIPITOR) 20 MG tablet, Take 1 tablet by mouth Every Night., Disp: 90 tablet, Rfl: 1    baclofen (LIORESAL) 10 MG tablet, Take 1 tablet by mouth 3 (Three) Times a Day., Disp: 30 tablet, Rfl: 0    cetirizine (zyrTEC) 10 MG tablet, Take 1 tablet by mouth Daily., Disp: 30 tablet, Rfl: 2    HYDROcodone-acetaminophen (NORCO)  MG per tablet, Take 1 tablet by mouth Every 6 (Six) Hours As Needed for Moderate Pain or Severe Pain., Disp: 10 tablet, Rfl: 0    [START ON 7/2/2025] HYDROcodone-acetaminophen (NORCO) 5-325 MG per tablet, Take 1 tablet by mouth 2 (Two) Times a Day As Needed for Severe Pain for up to 30 days., Disp: 60 tablet, Rfl: 0    hydrOXYzine (ATARAX) 25 MG tablet, Take 1 tablet by mouth Every 8 (Eight) Hours As Needed for Anxiety or Allergies., Disp: 30 tablet, Rfl: 2    lisinopril (PRINIVIL,ZESTRIL) 10 MG tablet, Take 1 tablet by mouth Daily., Disp: , Rfl:      lisinopril-hydrochlorothiazide (PRINZIDE,ZESTORETIC) 20-25 MG per tablet, Take 1 tablet by mouth Daily., Disp: 90 tablet, Rfl: 1    meloxicam (MOBIC) 15 MG tablet, Take 1 tablet by mouth Daily., Disp: 90 tablet, Rfl: 1    metaxalone (SKELAXIN) 800 MG tablet, Take 1 tablet by mouth 3 (Three) Times a Day As Needed for Muscle Spasms (And pain)., Disp: 15 tablet, Rfl: 0    methylPREDNISolone (MEDROL) 4 MG dose pack, Take as directed on package instructions., Disp: 1 each, Rfl: 0    pantoprazole (PROTONIX) 40 MG EC tablet, Take 1 tablet by mouth Daily., Disp: 90 tablet, Rfl: 1    rimegepant sulfate ODT (Nurtec) 75 MG disintegrating tablet, Place 1 tablet under the tongue Every Other Day As Needed (headache)., Disp: 8 tablet, Rfl: 5    [START ON 7/8/2025] HYDROcodone-acetaminophen (NORCO) 7.5-325 MG per tablet, Take 1 tablet by mouth 2 (Two) Times a Day As Needed for Moderate Pain for up to 30 days., Disp: 60 tablet, Rfl: 0    [START ON 8/7/2025] HYDROcodone-acetaminophen (NORCO) 7.5-325 MG per tablet, Take 1 tablet by mouth 2 (Two) Times a Day As Needed for Moderate Pain for up to 30 days., Disp: 60 tablet, Rfl: 0    pregabalin (Lyrica) 75 MG capsule, Take 1 capsule by mouth 2 (Two) Times a Day for 60 days., Disp: 60 capsule, Rfl: 1    The following portions of the patient's history were reviewed and updated as appropriate: allergies, current medications, past family history, past medical history, past social history, past surgical history, and problem list.      REVIEW OF PERTINENT MEDICAL DATA    Past Medical History:   Diagnosis Date    Anxiety     Arthritis     Back pain     Chronic pain disorder     Depression     Endometriosis     Extremity pain     Fractures     Headache, tension-type     High cholesterol     Hypertension     Joint pain     Low back pain     Lumbosacral disc disease     Migraine     Neck pain     Osteoarthritis     Rheumatoid arthritis     Shoulder pain, bilateral      Past Surgical History:    Procedure Laterality Date    EPIDURAL BLOCK      SPINAL CORD STIMULATOR IMPLANT      TUBAL ABDOMINAL LIGATION       Family History   Problem Relation Age of Onset    COPD Mother     Drug abuse Mother     No Known Problems Father     Arthritis Maternal Grandmother      Social History     Socioeconomic History    Marital status:    Tobacco Use    Smoking status: Former     Current packs/day: 0.00     Average packs/day: 1 pack/day for 17.2 years (17.2 ttl pk-yrs)     Types: Cigarettes     Quit date:      Years since quittin.5    Smokeless tobacco: Never    Tobacco comments:     quit 13 yrs ago   Vaping Use    Vaping status: Never Used   Substance and Sexual Activity    Alcohol use: Yes     Comment: caffeine 1c qd    Drug use: Defer    Sexual activity: Not Currently         Review of Systems   Musculoskeletal:  Positive for arthralgias.         Vitals:    25 1327   BP: 159/99   Pulse: 92   Resp: 16   SpO2: 99%   Weight: 108 kg (239 lb)   PainSc: 7          Objective   Physical Exam  Musculoskeletal:        Legs:            Imaging Reviewed:  Lumbar x-ray-2024  - Advanced bilateral L5-S1 facet arthropathy with grade 1 anterolisthesis of L5 on S1  - Degenerative sclerotic changes of right SI joint inferiorly     Right shoulder x-ray-  - Mild to moderate AC joint osteoarthropathy    Assessment:    1. Lumbar radiculopathy    2. Lumbar spondylosis    3. High risk medication use    4. Occipital neuralgia, unspecified laterality    5. Sacroiliac joint dysfunction               Plan:   1. UDS on 2024 is consistent with patient's interview and negative for any drugs not prescribed.  Narcotic contract was signed on 2024.  2. We discussed trying a course of formal physical therapy.  Physical therapy can help strengthen and stretch the muscles around the joints. Continue to be as active as possible.  Patient has done 6 of physical therapy with moderate improvement  3.  Patient has failed  meloxicam, tramadol and diclofenac.  For now, increase Norco 7.5-325 mg BID PRN (7/8; 8/7). Discussed with the patient regarding long-term side effects of opioids including but not limited to opioid induced hormonal suppression, hyperalgesia, fatigue, weight gain, possible opioid induced altered immune system, addiction, tolerance, dependence, risk of hearing loss and elevated risk of myocardial infarction. Proper use and potential life threatening side effects of over use discussed with patient. Patient states understanding of their use and risks.  Opioid Education for patient's receiving narcotics for pain: Patient was instructed regarding the risks, benefits, alternative forms of treatment, as well as potential development of tolerance and/or addiction. Patient was instructed to take the medication strictly as ordered, not to share the medication with others, not to drive while taking opioids, and to take no other sedatives/pain medications or alcohol while taking this prescription. The patient was instructed to wean off of the pain medication as healing occurs and pain resolves.   4.  Due to radicular nerve pain, start Lyrica 75 mg BID. Failed gabapentin. Side effects discussed with the patient including but not limited to somnolence, dizziness, ataxia, headache, fatigue, blurred vision, cold or flu-like symptoms,delusions, hoarseness, lack or loss of strength, lower back or side pain, swelling of the hands, feet, or lower legs trembling or shaking. Patient understands and agrees with the plan.  5.  Her migraines are consistent with occipital neuralgia pain.  May consider bilateral lesser occipital nerve blocks in future.  Recommend getting imaging done for head to rule out other causes.  6.  Excellent pain relief with bilateral L4-SA RFA for axial lower back pain.  Her main complaint is left leg radiculopathy which is new from previous exam.  Concerned about nerve impingement or spinal canal stenosis.  Will  obtain lumbar MRI without contrast at open MRI as patient is claustrophobic.  Sent to [facility] again on 6/30/25. Will consider LESI after reviewing MRI.      RTC after MRI     Yonas Toussaint DO  Pain Management   [facility]           INSPECT REPORT    As part of the patient's treatment plan, I may be prescribing controlled substances. The patient has been made aware of appropriate use of such medications, including potential risk of somnolence, limited ability to drive and/or work safely, and the potential for dependence or overdose. It has also been made clear that these medications are for use by this patient only, without concomitant use of alcohol or other substances unless prescribed.     Patient has completed prescribing agreement detailing terms of continued prescribing of controlled substances, including monitoring INSPECT reports, urine drug screening, and pill counts if necessary. The patient is aware that inappropriate use will results in cessation of prescribing such medications.    INSPECT report has been reviewed and scanned into the patient's chart.

## 2025-07-01 ENCOUNTER — OFFICE VISIT (OUTPATIENT)
Dept: FAMILY MEDICINE CLINIC | Facility: CLINIC | Age: 55
End: 2025-07-01
Payer: MEDICAID

## 2025-07-01 VITALS
DIASTOLIC BLOOD PRESSURE: 85 MMHG | BODY MASS INDEX: 37.48 KG/M2 | OXYGEN SATURATION: 95 % | TEMPERATURE: 97.2 F | HEART RATE: 81 BPM | SYSTOLIC BLOOD PRESSURE: 125 MMHG | HEIGHT: 67 IN | WEIGHT: 238.8 LBS

## 2025-07-01 DIAGNOSIS — F40.243 ANXIETY WITH FLYING: ICD-10-CM

## 2025-07-01 DIAGNOSIS — Z00.00 WELLNESS EXAMINATION: Primary | ICD-10-CM

## 2025-07-01 PROCEDURE — 3079F DIAST BP 80-89 MM HG: CPT | Performed by: FAMILY MEDICINE

## 2025-07-01 PROCEDURE — 1159F MED LIST DOCD IN RCRD: CPT | Performed by: FAMILY MEDICINE

## 2025-07-01 PROCEDURE — 3074F SYST BP LT 130 MM HG: CPT | Performed by: FAMILY MEDICINE

## 2025-07-01 PROCEDURE — 1125F AMNT PAIN NOTED PAIN PRSNT: CPT | Performed by: FAMILY MEDICINE

## 2025-07-01 PROCEDURE — 1160F RVW MEDS BY RX/DR IN RCRD: CPT | Performed by: FAMILY MEDICINE

## 2025-07-01 PROCEDURE — 99396 PREV VISIT EST AGE 40-64: CPT | Performed by: FAMILY MEDICINE

## 2025-07-01 RX ORDER — SALMONELLA TYPHI TY21A 6000000000 [CFU]/1
1 CAPSULE, COATED ORAL EVERY OTHER DAY
Qty: 4 CAPSULE | Refills: 0 | Status: SHIPPED | OUTPATIENT
Start: 2025-07-01 | End: 2025-07-08

## 2025-07-01 RX ORDER — SALMONELLA TYPHI TY21A 6000000000 [CFU]/1
1 CAPSULE, COATED ORAL EVERY OTHER DAY
Qty: 4 CAPSULE | Refills: 0 | Status: SHIPPED | OUTPATIENT
Start: 2025-07-01 | End: 2025-07-01

## 2025-07-01 RX ORDER — DOXYCYCLINE 100 MG/1
100 CAPSULE ORAL DAILY
Qty: 42 CAPSULE | Refills: 0 | Status: SHIPPED | OUTPATIENT
Start: 2025-07-01

## 2025-07-01 RX ORDER — ALPRAZOLAM 0.5 MG
0.5 TABLET ORAL 2 TIMES DAILY PRN
Qty: 20 TABLET | Refills: 0 | Status: SHIPPED | OUTPATIENT
Start: 2025-07-01

## 2025-07-01 NOTE — PROGRESS NOTES
"Tyesha Appiah is a 54 y.o. female and is here for a comprehensive physical exam. The patient reports problems - Worsening lag and back pains, saw Pain Management yesterday and was prescribed Lyrica and ordered an MRI..[facility].     Do you take any herbs or supplements that were not prescribed by a doctor? no  Are you taking calcium supplements? no  Are you taking aspirin daily? no    The following portions of the patient's history were reviewed and updated as appropriate: allergies, current medications, past family history, past medical history, past social history, past surgical history, and problem list.    Review of Systems  Do you have pain that bothers you in your daily life? yes  Pertinent items are noted in HPI.    Objective   /85 (BP Location: Right arm, Patient Position: Sitting, Cuff Size: Large Adult)   Pulse 81   Temp 97.2 °F (36.2 °C) (Temporal)   Ht 170.2 cm (67\")   Wt 108 kg (238 lb 12.8 oz)   SpO2 95%   BMI 37.40 kg/m²   General appearance: alert, appears stated age, and cooperative  Head: Normocephalic, without obvious abnormality, atraumatic  Eyes: conjunctivae/corneas clear. PERRL, EOM's intact. Fundi benign.  Ears: normal TM's and external ear canals both ears  Throat: lips, mucosa, and tongue normal; teeth and gums normal  Neck: no adenopathy, no JVD, supple, symmetrical, trachea midline, and thyroid not enlarged, symmetric, no tenderness/mass/nodules  Lungs: clear to auscultation bilaterally  Heart: regular rate and rhythm, S1, S2 normal, no murmur, click, rub or gallop  Abdomen: soft, non-tender; bowel sounds normal; no masses,  no organomegaly  Extremities: extremities normal, atraumatic, no cyanosis or edema  Pulses: 2+ and symmetric  Skin: Skin color, texture, turgor normal. No rashes or lesions  Lymph nodes: Cervical, supraclavicular, and axillary nodes normal.  Neurologic: Grossly normal     Admission on 06/26/2025, " Discharged on 06/26/2025   Component Date Value Ref Range Status    Right Common Femoral Spont 06/26/2025 Y   Final    Right Common Femoral Competent 06/26/2025 Y   Final    Right Common Femoral Phasic 06/26/2025 Y   Final    Right Common Femoral Compress 06/26/2025 C   Final    Right Common Femoral Augment 06/26/2025 Y   Final    Right Saphenofemoral Junction Comp* 06/26/2025 C   Final    Right Proximal Femoral Compress 06/26/2025 C   Final    Right Mid Femoral Spont 06/26/2025 Y   Final    Right Mid Femoral Competent 06/26/2025 Y   Final    Right Mid Femoral Phasic 06/26/2025 Y   Final    Right Mid Femoral Compress 06/26/2025 C   Final    Right Mid Femoral Augment 06/26/2025 Y   Final    Right Distal Femoral Compress 06/26/2025 C   Final    Right Popliteal Spont 06/26/2025 Y   Final    Right Popliteal Competent 06/26/2025 Y   Final    Right Popliteal Phasic 06/26/2025 Y   Final    Right Popliteal Compress 06/26/2025 C   Final    Right Popliteal Augment 06/26/2025 Y   Final    Right Posterior Tibial Compress 06/26/2025 C   Final    Right Peroneal Compress 06/26/2025 C   Final    Right Gastronemius Compress 06/26/2025 C   Final    Right Greater Saph AK Compress 06/26/2025 C   Final    Right Greater Saph BK Compress 06/26/2025 C   Final    Right Lesser Saph Compress 06/26/2025 C   Final    Left Common Femoral Spont 06/26/2025 Y   Final    Left Common Femoral Competent 06/26/2025 Y   Final    Left Common Femoral Phasic 06/26/2025 Y   Final    Left Common Femoral Compress 06/26/2025 C   Final    Left Common Femoral Augment 06/26/2025 Y   Final    Left Saphenofemoral Junction Compr* 06/26/2025 C   Final    Left Proximal Femoral Compress 06/26/2025 C   Final    Left Mid Femoral Spont 06/26/2025 Y   Final    Left Mid Femoral Competent 06/26/2025 Y   Final    Left Mid Femoral Phasic 06/26/2025 Y   Final    Left Mid Femoral Compress 06/26/2025 C   Final    Left Mid Femoral Augment 06/26/2025 Y   Final    Left Distal Femoral  Compress 06/26/2025 C   Final    Left Popliteal Spont 06/26/2025 Y   Final    Left Popliteal Competent 06/26/2025 Y   Final    Left Popliteal Phasic 06/26/2025 Y   Final    Left Popliteal Compress 06/26/2025 C   Final    Left Popliteal Augment 06/26/2025 Y   Final    Left Posterior Tibial Compress 06/26/2025 C   Final    Left Peroneal Compress 06/26/2025 C   Final    Left Gastronemius Compress 06/26/2025 C   Final    Left Greater Saph AK Compress 06/26/2025 C   Final    Left Greater Saph BK Compress 06/26/2025 C   Final    Left Lesser Saph Compress 06/26/2025 C   Final    Right Profunda Femoral Compress 06/26/2025 C   Final    Left Profunda Femoral Compress 06/26/2025 C   Final       Assessment & Plan   Healthy female exam.   Diagnoses and all orders for this visit:    1. Wellness examination (Primary)  -     Lipid Panel  -     CBC & Differential  -     Comprehensive Metabolic Panel    2. Anxiety with flying  -     ALPRAZolam (Xanax) 0.5 MG tablet; Take 1 tablet by mouth 2 (Two) Times a Day As Needed for Anxiety.  Dispense: 20 tablet; Refill: 0    Other orders  -     Discontinue: typhoid (Vivotif) DR capsule; Take 1 capsule by mouth Every Other Day for 4 doses.  Dispense: 4 capsule; Refill: 0  -     typhoid (Vivotif) DR capsule; Take 1 capsule by mouth Every Other Day for 4 doses. Start medicine 2 weeks prior to travel, take with cold water one hour prior to a meal  Dispense: 4 capsule; Refill: 0  -     doxycycline (MONODOX) 100 MG capsule; Take 1 capsule by mouth Daily. Start medicine 2 days prior to travel, continue throughout travel and for 4 weeks after travel.  Dispense: 42 capsule; Refill: 0      1. Well exam.   2. Patient Counseling:  --Nutrition: Stressed importance of moderation in sodium/caffeine intake, saturated fat and cholesterol, caloric balance, sufficient intake of fresh fruits, vegetables, fiber, calcium, iron  --Exercise: Stressed the importance of regular exercise.   --Dental health: Discussed  importance of regular tooth brushing, flossing, and dental visits.  --Immunizations reviewed.  --Discussed benefits of screening colonoscopy.    3. Discussed the patient's BMI with her.  The BMI is above average; BMI management plan is completed  4. Follow up in one year

## 2025-07-07 ENCOUNTER — TELEPHONE (OUTPATIENT)
Dept: FAMILY MEDICINE CLINIC | Facility: CLINIC | Age: 55
End: 2025-07-07
Payer: MEDICAID

## 2025-07-07 DIAGNOSIS — M25.562 CHRONIC PAIN OF BOTH KNEES: ICD-10-CM

## 2025-07-07 DIAGNOSIS — G89.29 CHRONIC LEFT-SIDED LOW BACK PAIN WITH LEFT-SIDED SCIATICA: ICD-10-CM

## 2025-07-07 DIAGNOSIS — G89.29 CHRONIC PAIN OF BOTH KNEES: ICD-10-CM

## 2025-07-07 DIAGNOSIS — M25.561 CHRONIC PAIN OF BOTH KNEES: ICD-10-CM

## 2025-07-07 DIAGNOSIS — M54.42 CHRONIC LEFT-SIDED LOW BACK PAIN WITH LEFT-SIDED SCIATICA: ICD-10-CM

## 2025-07-07 RX ORDER — SALMONELLA TYPHI TY2 VI POLYSACCHARIDE ANTIGEN 25 UG/.5ML
0.5 INJECTION, SOLUTION INTRAMUSCULAR ONCE
Qty: 0.5 ML | Refills: 0 | Status: SHIPPED | OUTPATIENT
Start: 2025-07-07 | End: 2025-07-07

## 2025-07-07 RX ORDER — LIDOCAINE 50 MG/G
PATCH TOPICAL
Qty: 90 PATCH | Refills: 0 | Status: SHIPPED | OUTPATIENT
Start: 2025-07-07

## 2025-07-07 NOTE — TELEPHONE ENCOUNTER
GERRY FROM [facility] CALLED STATING THAT LO'S INSURANCE DOES NOT COVER typhoid (Vivotif) DR capsule. HE STATES THAT THE INJECTION IS COVERED. GERRY SAYS TO PUT THE ORDER IN FOR TYPHIM INJECTION. ANY QUESTIONS CONTACT GERRY AT [facility] -[phone].

## 2025-07-11 ENCOUNTER — TELEPHONE (OUTPATIENT)
Dept: FAMILY MEDICINE CLINIC | Facility: CLINIC | Age: 55
End: 2025-07-11

## 2025-07-14 ENCOUNTER — TELEPHONE (OUTPATIENT)
Dept: PAIN MEDICINE | Facility: CLINIC | Age: 55
End: 2025-07-14
Payer: MEDICAID

## 2025-07-14 NOTE — TELEPHONE ENCOUNTER
"Caller: Aisha Appiah \"Aisha Appiah\"    Relationship to patient: Self      Best call back number: [phone]    Provider: DR ESCOBAR     Medication PA needed: HYDROCODONE 7.5-325    Reason for call/Prior Auth: PT'S PHARMACY FILLED 7 DAYS OF RX, BUT NEEDS PRIOR AUTH FOR MORE MEDICATION. PLEASE CONTACT PT ONCE PRIOR AUTH SENT.    "

## 2025-07-21 ENCOUNTER — TELEPHONE (OUTPATIENT)
Dept: PAIN MEDICINE | Facility: CLINIC | Age: 55
End: 2025-07-21
Payer: MEDICAID

## 2025-07-21 DIAGNOSIS — M54.16 LUMBAR RADICULOPATHY: ICD-10-CM

## 2025-07-21 DIAGNOSIS — M54.81 OCCIPITAL NEURALGIA, UNSPECIFIED LATERALITY: ICD-10-CM

## 2025-07-21 DIAGNOSIS — Z79.899 HIGH RISK MEDICATION USE: ICD-10-CM

## 2025-07-21 DIAGNOSIS — M53.3 SACROILIAC JOINT DYSFUNCTION: ICD-10-CM

## 2025-07-21 DIAGNOSIS — M47.816 LUMBAR SPONDYLOSIS: ICD-10-CM

## 2025-07-21 RX ORDER — HYDROCODONE BITARTRATE AND ACETAMINOPHEN 7.5; 325 MG/1; MG/1
1 TABLET ORAL EVERY 8 HOURS PRN
Qty: 90 TABLET | Refills: 0 | Status: SHIPPED | OUTPATIENT
Start: 2025-07-21 | End: 2025-08-20

## 2025-07-21 NOTE — TELEPHONE ENCOUNTER
"Provider: DR ESCOBAR    Caller: Aisha Appiah \"Aisha Appiah\"    Relationship to Patient: Self    Pharmacy: Pharmacy: [facility] [id number] - [place], [place] - [address] AT [facility] [address] - [phone] [facility] [phone] FX     Phone Number: [phone]    Reason for Call: FOLLOWING UP ON PAIN MEDICATION REFILL AND INSURANCE APPROVAL AS THEY ONLY WANT TO PAY FOR 7 DAYS. THE INSURANCE COMPANY IS WAITING ON A LETTER FROM DR ESCOBAR TO STATE SHE NEEDS MORE THAN 7 DAYS OF THIS MEDICATION DUE TO CHRONIC PAIN. PATIENT IS COMPLETELY OUT OF MEDICATION.       "

## 2025-07-21 NOTE — TELEPHONE ENCOUNTER
Patients insurance is only paying for 7 days supply, She is going on vacation and is asking if you can send rx for 3 times a day instead of 2 just so she has enough meds until insurance approve it.  We sending an appeal today since insurance has already denied it.

## 2025-07-21 NOTE — TELEPHONE ENCOUNTER
"Provider: DR ESCOBAR     Caller: Aisha Appiah \"Aisha Appiah\"    Relationship to Patient: Self    Pharmacy: Pharmacy: [facility] [id number] - [place], [place] - [address] AT [facility] [address] - [phone] [facility] [phone]      Phone Number: [phone]    Reason for Call: PATIENT IS ASKING THAT DR ESCOBAR SEND IN 7 DAYS OF HER HYDROCODONE 7.5 MG MEDICATION TO BE TAKEN 3 X DAILY INSTEAD OF 2X DAILY (TOTAL OF 21 TABLETS) TO HER PHARMACY SO SHE CAN HAVE SOME MEDICATION FOR TONIGHT AND THIS WILL HOLD HER OVER UNTIL INSURANCE DECIDES WHAT THEY ARE GOING TO DO.       "

## 2025-07-22 ENCOUNTER — TELEPHONE (OUTPATIENT)
Dept: PAIN MEDICINE | Facility: CLINIC | Age: 55
End: 2025-07-22
Payer: MEDICAID

## 2025-07-22 ENCOUNTER — TELEPHONE (OUTPATIENT)
Dept: PAIN MEDICINE | Facility: CLINIC | Age: 55
End: 2025-07-22

## 2025-07-22 NOTE — TELEPHONE ENCOUNTER
"  Caller: Aisha Appiah \"Aisha Appiah\"    Relationship: Self    Best call back number: [phone]    What was the call regarding: PT CALLING TO RELAY THAT HER INSURANCE COMPANY HAS SENT OVER A QUESTIONNAIRE VIA FAX TO DR ESCOBAR ON JULY 21 REGARDING PT'S PAIN MEDICATION. PT REQUESTING DR ESCOBAR TO FILL OUT AND SEND BACK QUESTIONNAIRE ASAP SO PT CAN HAVE RX FILLED. PT STATES THAT DR ESCOBAR NEEDS TO WRITE \"URGENT\" ON DOCUMENT FOR THE DOCUMENT TO PROCESSED QUICKLY.     PT WOULD LIKE TO KNOW IF DR ESCOBAR CAN SEND SOMETHING COMPARABLE TO THE PAIN MEDICATION WHILE PT IS WAITING FOR THIS TO BE FIGURED WITH INSURANCE.    PLEASE CONTACT PT TO DISCUSS QUESTIONNAIRE AND TO DISCUSS IF ANY RX CAN BE SENT IN THE INTERIM.   "

## 2025-07-22 NOTE — TELEPHONE ENCOUNTER
Caller: MALACHI    Relationship to patient: Other    Best call back number: [phone] - WAIT FOR PHARMACY PROMPT     Patient is needing: MALACHI WITH MEDICAID, [facility], IS CALLING TO LET OFFICE KNOW THAT SHE HAS RECEIVED MULTIPLE PRIOR AUTHS FROM OFFICE FOR HYDROCODONE-ACET   SHE STATES THAT THEY ARE SPECIFICALLY MISSING THE SAME THING.   SHE HAS FAXED A NEW FORM WITH A CRITERIA LIST ON COVER - SHE NEEDS PART 1 AND PART 3 ADDRESSED.

## 2025-07-22 NOTE — TELEPHONE ENCOUNTER
I filled it out  and it was exactly the same form we did the first time when they denied it. I faxed over the form, office notes, appeal letter and the guide lines the insurance has sent with the form. I do not know what else we can do at this point.

## 2025-08-15 ENCOUNTER — OFFICE VISIT (OUTPATIENT)
Dept: FAMILY MEDICINE CLINIC | Facility: CLINIC | Age: 55
End: 2025-08-15
Payer: MEDICAID

## 2025-08-15 VITALS
WEIGHT: 245.2 LBS | HEIGHT: 67 IN | BODY MASS INDEX: 38.48 KG/M2 | SYSTOLIC BLOOD PRESSURE: 117 MMHG | RESPIRATION RATE: 18 BRPM | HEART RATE: 75 BPM | DIASTOLIC BLOOD PRESSURE: 79 MMHG | TEMPERATURE: 96.6 F | OXYGEN SATURATION: 98 %

## 2025-08-15 DIAGNOSIS — R51.9 NONINTRACTABLE HEADACHE, UNSPECIFIED CHRONICITY PATTERN, UNSPECIFIED HEADACHE TYPE: Primary | ICD-10-CM

## 2025-08-15 DIAGNOSIS — Z20.822 CLOSE EXPOSURE TO COVID-19 VIRUS: ICD-10-CM

## 2025-08-15 LAB
EXPIRATION DATE: NORMAL
INTERNAL CONTROL: NORMAL
Lab: NORMAL
SARS-COV-2 AG UPPER RESP QL IA.RAPID: NOT DETECTED

## 2025-08-15 PROCEDURE — 1159F MED LIST DOCD IN RCRD: CPT | Performed by: FAMILY MEDICINE

## 2025-08-15 PROCEDURE — 1126F AMNT PAIN NOTED NONE PRSNT: CPT | Performed by: FAMILY MEDICINE

## 2025-08-15 PROCEDURE — 87426 SARSCOV CORONAVIRUS AG IA: CPT | Performed by: FAMILY MEDICINE

## 2025-08-15 PROCEDURE — 1160F RVW MEDS BY RX/DR IN RCRD: CPT | Performed by: FAMILY MEDICINE

## 2025-08-15 PROCEDURE — 3074F SYST BP LT 130 MM HG: CPT | Performed by: FAMILY MEDICINE

## 2025-08-15 PROCEDURE — 3078F DIAST BP <80 MM HG: CPT | Performed by: FAMILY MEDICINE

## 2025-08-15 PROCEDURE — 99213 OFFICE O/P EST LOW 20 MIN: CPT | Performed by: FAMILY MEDICINE

## 2025-08-22 ENCOUNTER — HOSPITAL ENCOUNTER (EMERGENCY)
Facility: HOSPITAL | Age: 55
Discharge: HOME | End: 2025-08-22
Attending: EMERGENCY MEDICINE
Payer: MEDICAID

## 2025-08-23 ENCOUNTER — HOSPITAL ENCOUNTER (EMERGENCY)
Facility: HOSPITAL | Age: 55
Discharge: HOME | End: 2025-08-23
Attending: EMERGENCY MEDICINE
Payer: MEDICAID

## 2025-08-25 ENCOUNTER — TELEPHONE (OUTPATIENT)
Dept: PAIN MEDICINE | Facility: CLINIC | Age: 55
End: 2025-08-25
Payer: MEDICAID

## 2025-08-25 RX ORDER — METAXALONE 800 MG/1
800 TABLET ORAL 3 TIMES DAILY PRN
Qty: 15 TABLET | Refills: 0 | Status: SHIPPED | OUTPATIENT
Start: 2025-08-25

## 2025-08-29 ENCOUNTER — HOSPITAL ENCOUNTER (EMERGENCY)
Facility: HOSPITAL | Age: 55
Discharge: HOME | End: 2025-08-29
Payer: MEDICAID

## 2025-08-29 VITALS
RESPIRATION RATE: 18 BRPM | DIASTOLIC BLOOD PRESSURE: 91 MMHG | BODY MASS INDEX: 38.13 KG/M2 | TEMPERATURE: 98 F | SYSTOLIC BLOOD PRESSURE: 138 MMHG | OXYGEN SATURATION: 95 % | WEIGHT: 242.95 LBS | HEIGHT: 67 IN | HEART RATE: 66 BPM

## 2025-08-29 DIAGNOSIS — M54.41 RIGHT-SIDED LOW BACK PAIN WITH RIGHT-SIDED SCIATICA, UNSPECIFIED CHRONICITY: Primary | ICD-10-CM

## 2025-08-29 PROCEDURE — 25010000002 KETOROLAC TROMETHAMINE PER 15 MG: Performed by: PHYSICIAN ASSISTANT

## 2025-08-29 PROCEDURE — 25010000002 HYDROMORPHONE 1 MG/ML SOLUTION: Performed by: PHYSICIAN ASSISTANT

## 2025-08-29 PROCEDURE — 25010000002 METHYLPREDNISOLONE PER 80 MG: Performed by: PHYSICIAN ASSISTANT

## 2025-08-29 PROCEDURE — 97161 PT EVAL LOW COMPLEX 20 MIN: CPT | Performed by: PHYSICAL THERAPIST

## 2025-08-29 RX ORDER — METHYLPREDNISOLONE ACETATE 80 MG/ML
60 INJECTION, SUSPENSION INTRA-ARTICULAR; INTRALESIONAL; INTRAMUSCULAR; SOFT TISSUE ONCE
Status: COMPLETED | OUTPATIENT
Start: 2025-08-29 | End: 2025-08-29

## 2025-08-29 RX ORDER — KETOROLAC TROMETHAMINE 30 MG/ML
60 INJECTION, SOLUTION INTRAMUSCULAR; INTRAVENOUS ONCE
Status: COMPLETED | OUTPATIENT
Start: 2025-08-29 | End: 2025-08-29

## 2025-08-29 RX ORDER — PREDNISONE 20 MG/1
20 TABLET ORAL 3 TIMES DAILY
Qty: 15 TABLET | Refills: 0 | Status: SHIPPED | OUTPATIENT
Start: 2025-08-29 | End: 2025-09-03

## 2025-08-29 RX ADMIN — KETOROLAC TROMETHAMINE 60 MG: 30 INJECTION INTRAMUSCULAR; INTRAVENOUS at 09:51

## 2025-08-29 RX ADMIN — METHYLPREDNISOLONE ACETATE 60 MG: 80 INJECTION, SUSPENSION INTRA-ARTICULAR; INTRALESIONAL; INTRAMUSCULAR; SOFT TISSUE at 09:53

## 2025-08-29 RX ADMIN — HYDROMORPHONE HYDROCHLORIDE 1 MG: 1 INJECTION, SOLUTION INTRAMUSCULAR; INTRAVENOUS; SUBCUTANEOUS at 09:50
